# Patient Record
Sex: FEMALE | Race: OTHER | HISPANIC OR LATINO | URBAN - METROPOLITAN AREA
[De-identification: names, ages, dates, MRNs, and addresses within clinical notes are randomized per-mention and may not be internally consistent; named-entity substitution may affect disease eponyms.]

---

## 2017-08-29 ENCOUNTER — INPATIENT (INPATIENT)
Facility: HOSPITAL | Age: 82
LOS: 1 days | Discharge: HOME HEALTH | End: 2017-08-31
Attending: HOSPITALIST | Admitting: HOSPITALIST
Payer: MEDICAID

## 2017-08-29 VITALS
DIASTOLIC BLOOD PRESSURE: 92 MMHG | SYSTOLIC BLOOD PRESSURE: 162 MMHG | HEART RATE: 124 BPM | OXYGEN SATURATION: 94 % | RESPIRATION RATE: 20 BRPM | TEMPERATURE: 98 F

## 2017-08-29 DIAGNOSIS — E11.9 TYPE 2 DIABETES MELLITUS WITHOUT COMPLICATIONS: ICD-10-CM

## 2017-08-29 DIAGNOSIS — Z29.9 ENCOUNTER FOR PROPHYLACTIC MEASURES, UNSPECIFIED: ICD-10-CM

## 2017-08-29 DIAGNOSIS — E87.1 HYPO-OSMOLALITY AND HYPONATREMIA: ICD-10-CM

## 2017-08-29 DIAGNOSIS — E27.9 DISORDER OF ADRENAL GLAND, UNSPECIFIED: ICD-10-CM

## 2017-08-29 DIAGNOSIS — K25.9 GASTRIC ULCER, UNSPECIFIED AS ACUTE OR CHRONIC, WITHOUT HEMORRHAGE OR PERFORATION: ICD-10-CM

## 2017-08-29 DIAGNOSIS — D72.829 ELEVATED WHITE BLOOD CELL COUNT, UNSPECIFIED: ICD-10-CM

## 2017-08-29 DIAGNOSIS — I10 ESSENTIAL (PRIMARY) HYPERTENSION: ICD-10-CM

## 2017-08-29 DIAGNOSIS — M25.50 PAIN IN UNSPECIFIED JOINT: ICD-10-CM

## 2017-08-29 DIAGNOSIS — K86.9 DISEASE OF PANCREAS, UNSPECIFIED: ICD-10-CM

## 2017-08-29 LAB
ALBUMIN SERPL ELPH-MCNC: 3.1 G/DL — LOW (ref 3.3–5)
ALP SERPL-CCNC: 108 U/L — SIGNIFICANT CHANGE UP (ref 40–120)
ALT FLD-CCNC: 19 U/L — SIGNIFICANT CHANGE UP (ref 4–33)
AMYLASE P1 CFR SERPL: 209 U/L — HIGH (ref 25–125)
APPEARANCE UR: CLEAR — SIGNIFICANT CHANGE UP
AST SERPL-CCNC: 27 U/L — SIGNIFICANT CHANGE UP (ref 4–32)
BASE EXCESS BLDV CALC-SCNC: 3.3 MMOL/L — SIGNIFICANT CHANGE UP
BILIRUB SERPL-MCNC: 0.9 MG/DL — SIGNIFICANT CHANGE UP (ref 0.2–1.2)
BILIRUB UR-MCNC: NEGATIVE — SIGNIFICANT CHANGE UP
BLOOD GAS VENOUS - CREATININE: 0.72 MG/DL — SIGNIFICANT CHANGE UP (ref 0.5–1.3)
BLOOD UR QL VISUAL: NEGATIVE — SIGNIFICANT CHANGE UP
BUN SERPL-MCNC: 12 MG/DL — SIGNIFICANT CHANGE UP (ref 7–23)
BUN SERPL-MCNC: 16 MG/DL — SIGNIFICANT CHANGE UP (ref 7–23)
CALCIUM SERPL-MCNC: 8.3 MG/DL — LOW (ref 8.4–10.5)
CALCIUM SERPL-MCNC: 9.2 MG/DL — SIGNIFICANT CHANGE UP (ref 8.4–10.5)
CHLORIDE BLDV-SCNC: 91 MMOL/L — LOW (ref 96–108)
CHLORIDE SERPL-SCNC: 84 MMOL/L — LOW (ref 98–107)
CHLORIDE SERPL-SCNC: 89 MMOL/L — LOW (ref 98–107)
CO2 SERPL-SCNC: 24 MMOL/L — SIGNIFICANT CHANGE UP (ref 22–31)
CO2 SERPL-SCNC: 25 MMOL/L — SIGNIFICANT CHANGE UP (ref 22–31)
COLOR SPEC: SIGNIFICANT CHANGE UP
CREAT SERPL-MCNC: 0.79 MG/DL — SIGNIFICANT CHANGE UP (ref 0.5–1.3)
CREAT SERPL-MCNC: 0.89 MG/DL — SIGNIFICANT CHANGE UP (ref 0.5–1.3)
GAS PNL BLDV: 123 MMOL/L — LOW (ref 136–146)
GLUCOSE BLDV-MCNC: 216 — HIGH (ref 70–99)
GLUCOSE SERPL-MCNC: 211 MG/DL — HIGH (ref 70–99)
GLUCOSE SERPL-MCNC: 224 MG/DL — HIGH (ref 70–99)
GLUCOSE UR-MCNC: 100 — SIGNIFICANT CHANGE UP
HBA1C BLD-MCNC: 8.7 % — HIGH (ref 4–5.6)
HCO3 BLDV-SCNC: 26 MMOL/L — SIGNIFICANT CHANGE UP (ref 20–27)
HCT VFR BLD CALC: 35.4 % — SIGNIFICANT CHANGE UP (ref 34.5–45)
HCT VFR BLDV CALC: 34.9 % — SIGNIFICANT CHANGE UP (ref 34.5–45)
HGB BLD-MCNC: 11.9 G/DL — SIGNIFICANT CHANGE UP (ref 11.5–15.5)
HGB BLDV-MCNC: 11.3 G/DL — LOW (ref 11.5–15.5)
KETONES UR-MCNC: NEGATIVE — SIGNIFICANT CHANGE UP
LACTATE BLDV-MCNC: 1.3 MMOL/L — SIGNIFICANT CHANGE UP (ref 0.5–2)
LEUKOCYTE ESTERASE UR-ACNC: HIGH
LIDOCAIN IGE QN: 38.5 U/L — SIGNIFICANT CHANGE UP (ref 7–60)
MCHC RBC-ENTMCNC: 27.4 PG — SIGNIFICANT CHANGE UP (ref 27–34)
MCHC RBC-ENTMCNC: 33.6 % — SIGNIFICANT CHANGE UP (ref 32–36)
MCV RBC AUTO: 81.4 FL — SIGNIFICANT CHANGE UP (ref 80–100)
MUCOUS THREADS # UR AUTO: SIGNIFICANT CHANGE UP
NITRITE UR-MCNC: NEGATIVE — SIGNIFICANT CHANGE UP
NRBC # FLD: 0 — SIGNIFICANT CHANGE UP
PCO2 BLDV: 53 MMHG — HIGH (ref 41–51)
PH BLDV: 7.35 PH — SIGNIFICANT CHANGE UP (ref 7.32–7.43)
PH UR: 7 — SIGNIFICANT CHANGE UP (ref 4.6–8)
PLATELET # BLD AUTO: 355 K/UL — SIGNIFICANT CHANGE UP (ref 150–400)
PMV BLD: 10.1 FL — SIGNIFICANT CHANGE UP (ref 7–13)
PO2 BLDV: 27 MMHG — LOW (ref 35–40)
POTASSIUM BLDV-SCNC: 3.6 MMOL/L — SIGNIFICANT CHANGE UP (ref 3.4–4.5)
POTASSIUM SERPL-MCNC: 3.4 MMOL/L — LOW (ref 3.5–5.3)
POTASSIUM SERPL-MCNC: 3.9 MMOL/L — SIGNIFICANT CHANGE UP (ref 3.5–5.3)
POTASSIUM SERPL-SCNC: 3.4 MMOL/L — LOW (ref 3.5–5.3)
POTASSIUM SERPL-SCNC: 3.9 MMOL/L — SIGNIFICANT CHANGE UP (ref 3.5–5.3)
PROT SERPL-MCNC: 7.2 G/DL — SIGNIFICANT CHANGE UP (ref 6–8.3)
PROT UR-MCNC: 10 — SIGNIFICANT CHANGE UP
RBC # BLD: 4.35 M/UL — SIGNIFICANT CHANGE UP (ref 3.8–5.2)
RBC # FLD: 13.3 % — SIGNIFICANT CHANGE UP (ref 10.3–14.5)
RBC CASTS # UR COMP ASSIST: SIGNIFICANT CHANGE UP (ref 0–?)
SAO2 % BLDV: 43.5 % — LOW (ref 60–85)
SODIUM SERPL-SCNC: 125 MMOL/L — LOW (ref 135–145)
SODIUM SERPL-SCNC: 127 MMOL/L — LOW (ref 135–145)
SP GR SPEC: > 1.04 — HIGH (ref 1–1.03)
SQUAMOUS # UR AUTO: SIGNIFICANT CHANGE UP
UROBILINOGEN FLD QL: NORMAL E.U. — SIGNIFICANT CHANGE UP (ref 0.1–0.2)
WBC # BLD: 14.44 K/UL — HIGH (ref 3.8–10.5)
WBC # FLD AUTO: 14.44 K/UL — HIGH (ref 3.8–10.5)
WBC UR QL: HIGH (ref 0–?)

## 2017-08-29 PROCEDURE — 99223 1ST HOSP IP/OBS HIGH 75: CPT | Mod: AI

## 2017-08-29 PROCEDURE — 74177 CT ABD & PELVIS W/CONTRAST: CPT | Mod: 26

## 2017-08-29 PROCEDURE — 71275 CT ANGIOGRAPHY CHEST: CPT | Mod: 26

## 2017-08-29 RX ORDER — INSULIN LISPRO 100/ML
VIAL (ML) SUBCUTANEOUS
Qty: 0 | Refills: 0 | Status: DISCONTINUED | OUTPATIENT
Start: 2017-08-29 | End: 2017-08-31

## 2017-08-29 RX ORDER — ONDANSETRON 8 MG/1
4 TABLET, FILM COATED ORAL ONCE
Qty: 0 | Refills: 0 | Status: COMPLETED | OUTPATIENT
Start: 2017-08-29 | End: 2017-08-29

## 2017-08-29 RX ORDER — SODIUM CHLORIDE 9 MG/ML
1000 INJECTION INTRAMUSCULAR; INTRAVENOUS; SUBCUTANEOUS
Qty: 0 | Refills: 0 | Status: DISCONTINUED | OUTPATIENT
Start: 2017-08-29 | End: 2017-08-30

## 2017-08-29 RX ORDER — DEXTROSE 50 % IN WATER 50 %
1 SYRINGE (ML) INTRAVENOUS ONCE
Qty: 0 | Refills: 0 | Status: DISCONTINUED | OUTPATIENT
Start: 2017-08-29 | End: 2017-08-31

## 2017-08-29 RX ORDER — SIMVASTATIN 20 MG/1
20 TABLET, FILM COATED ORAL AT BEDTIME
Qty: 0 | Refills: 0 | Status: DISCONTINUED | OUTPATIENT
Start: 2017-08-29 | End: 2017-08-31

## 2017-08-29 RX ORDER — PANTOPRAZOLE SODIUM 20 MG/1
40 TABLET, DELAYED RELEASE ORAL ONCE
Qty: 0 | Refills: 0 | Status: COMPLETED | OUTPATIENT
Start: 2017-08-29 | End: 2017-08-29

## 2017-08-29 RX ORDER — DEXTROSE 50 % IN WATER 50 %
25 SYRINGE (ML) INTRAVENOUS ONCE
Qty: 0 | Refills: 0 | Status: DISCONTINUED | OUTPATIENT
Start: 2017-08-29 | End: 2017-08-31

## 2017-08-29 RX ORDER — ASPIRIN/CALCIUM CARB/MAGNESIUM 324 MG
81 TABLET ORAL DAILY
Qty: 0 | Refills: 0 | Status: DISCONTINUED | OUTPATIENT
Start: 2017-08-29 | End: 2017-08-31

## 2017-08-29 RX ORDER — DEXTROSE 50 % IN WATER 50 %
12.5 SYRINGE (ML) INTRAVENOUS ONCE
Qty: 0 | Refills: 0 | Status: DISCONTINUED | OUTPATIENT
Start: 2017-08-29 | End: 2017-08-31

## 2017-08-29 RX ORDER — SODIUM CHLORIDE 9 MG/ML
1000 INJECTION INTRAMUSCULAR; INTRAVENOUS; SUBCUTANEOUS ONCE
Qty: 0 | Refills: 0 | Status: COMPLETED | OUTPATIENT
Start: 2017-08-29 | End: 2017-08-29

## 2017-08-29 RX ORDER — PANTOPRAZOLE SODIUM 20 MG/1
40 TABLET, DELAYED RELEASE ORAL
Qty: 0 | Refills: 0 | Status: DISCONTINUED | OUTPATIENT
Start: 2017-08-29 | End: 2017-08-30

## 2017-08-29 RX ORDER — AMLODIPINE BESYLATE 2.5 MG/1
10 TABLET ORAL DAILY
Qty: 0 | Refills: 0 | Status: DISCONTINUED | OUTPATIENT
Start: 2017-08-29 | End: 2017-08-29

## 2017-08-29 RX ORDER — SODIUM CHLORIDE 9 MG/ML
1000 INJECTION, SOLUTION INTRAVENOUS
Qty: 0 | Refills: 0 | Status: DISCONTINUED | OUTPATIENT
Start: 2017-08-29 | End: 2017-08-31

## 2017-08-29 RX ORDER — METOPROLOL TARTRATE 50 MG
100 TABLET ORAL
Qty: 0 | Refills: 0 | Status: DISCONTINUED | OUTPATIENT
Start: 2017-08-29 | End: 2017-08-31

## 2017-08-29 RX ORDER — SODIUM CHLORIDE 9 MG/ML
500 INJECTION INTRAMUSCULAR; INTRAVENOUS; SUBCUTANEOUS ONCE
Qty: 0 | Refills: 0 | Status: COMPLETED | OUTPATIENT
Start: 2017-08-29 | End: 2017-08-29

## 2017-08-29 RX ORDER — INSULIN LISPRO 100/ML
VIAL (ML) SUBCUTANEOUS AT BEDTIME
Qty: 0 | Refills: 0 | Status: DISCONTINUED | OUTPATIENT
Start: 2017-08-29 | End: 2017-08-31

## 2017-08-29 RX ORDER — GLUCAGON INJECTION, SOLUTION 0.5 MG/.1ML
1 INJECTION, SOLUTION SUBCUTANEOUS ONCE
Qty: 0 | Refills: 0 | Status: DISCONTINUED | OUTPATIENT
Start: 2017-08-29 | End: 2017-08-31

## 2017-08-29 RX ORDER — MORPHINE SULFATE 50 MG/1
6 CAPSULE, EXTENDED RELEASE ORAL ONCE
Qty: 0 | Refills: 0 | Status: DISCONTINUED | OUTPATIENT
Start: 2017-08-29 | End: 2017-08-29

## 2017-08-29 RX ADMIN — PANTOPRAZOLE SODIUM 40 MILLIGRAM(S): 20 TABLET, DELAYED RELEASE ORAL at 08:13

## 2017-08-29 RX ADMIN — SIMVASTATIN 20 MILLIGRAM(S): 20 TABLET, FILM COATED ORAL at 21:22

## 2017-08-29 RX ADMIN — Medication 2: at 19:35

## 2017-08-29 RX ADMIN — MORPHINE SULFATE 6 MILLIGRAM(S): 50 CAPSULE, EXTENDED RELEASE ORAL at 06:06

## 2017-08-29 RX ADMIN — ONDANSETRON 4 MILLIGRAM(S): 8 TABLET, FILM COATED ORAL at 06:12

## 2017-08-29 RX ADMIN — ONDANSETRON 4 MILLIGRAM(S): 8 TABLET, FILM COATED ORAL at 05:11

## 2017-08-29 RX ADMIN — SODIUM CHLORIDE 75 MILLILITER(S): 9 INJECTION INTRAMUSCULAR; INTRAVENOUS; SUBCUTANEOUS at 14:00

## 2017-08-29 RX ADMIN — Medication 100 MILLIGRAM(S): at 19:29

## 2017-08-29 RX ADMIN — MORPHINE SULFATE 6 MILLIGRAM(S): 50 CAPSULE, EXTENDED RELEASE ORAL at 05:11

## 2017-08-29 RX ADMIN — Medication 81 MILLIGRAM(S): at 19:29

## 2017-08-29 RX ADMIN — Medication 30 MILLILITER(S): at 08:13

## 2017-08-29 RX ADMIN — Medication: at 13:32

## 2017-08-29 RX ADMIN — SODIUM CHLORIDE 250 MILLILITER(S): 9 INJECTION INTRAMUSCULAR; INTRAVENOUS; SUBCUTANEOUS at 07:25

## 2017-08-29 RX ADMIN — SODIUM CHLORIDE 1000 MILLILITER(S): 9 INJECTION INTRAMUSCULAR; INTRAVENOUS; SUBCUTANEOUS at 05:11

## 2017-08-29 NOTE — H&P ADULT - NSHPREVIEWOFSYSTEMS_GEN_ALL_CORE
Review of Systems:   CONSTITUTIONAL: No fever, weight loss, or fatigue  EYES: No eye pain, visual disturbances, or discharge  ENMT:  No difficulty hearing, tinnitus, vertigo; No sinus or throat pain  NECK: No pain or stiffness  BREASTS: No pain, masses, or nipple discharge  RESPIRATORY: No cough, wheezing, chills or hemoptysis; No shortness of breath  CARDIOVASCULAR: No chest pain, palpitations, dizziness, or leg swelling  GASTROINTESTINAL: + epigastric pain, + n/v  GENITOURINARY: No dysuria, frequency, hematuria, or incontinence  NEUROLOGICAL: No headaches, memory loss, loss of strength, numbness, or tremors  SKIN: No itching, burning, rashes, or lesions   LYMPH NODES: No enlarged glands  ENDOCRINE: No heat or cold intolerance; No hair loss  MUSCULOSKELETAL: No joint pain or swelling; No muscle, back, or extremity pain  PSYCHIATRIC: No depression, anxiety, mood swings, or difficulty sleeping  HEME/LYMPH: No easy bruising, or bleeding gums  ALLERY AND IMMUNOLOGIC: No hives or eczema

## 2017-08-29 NOTE — ED ADULT NURSE NOTE - CHIEF COMPLAINT QUOTE
Patient c/o generalized abdominal pain since yesterday, had nausea and vomiting. Tachycardiac in triage, EKG done.

## 2017-08-29 NOTE — H&P ADULT - PROBLEM SELECTOR PLAN 5
IMPROVE score 1 low risk for DVT incidental finding of adrenal nodule on CT-a/p  MRI -abd can be done as outpatient c/w home med  monitor BP

## 2017-08-29 NOTE — H&P ADULT - PROBLEM SELECTOR PLAN 2
likely due to dehydration from decreased oral intake  IVF   monitor electrolytes   check urine osmo / electrolytes no sign of active infection. Likely reactive.

## 2017-08-29 NOTE — CONSULT NOTE ADULT - ASSESSMENT
Impression:    1. Abdominal pain: With possible gastric ulcer seen on CT. DDX also includes diabetic gastroparesis given elevated A1C h/e pain is more prominent than nausea. Possible NSAID induced ulcer given hx of NSAID use. No sign of GI bleeding.    2. 7mm Pancreas hypodensity.    Recommendation:  -will consider performing EGD to confirm diagnosis of peptic ulcer disease and to r/o malignancy.  -Agree with empiric PPI and pain control  -Outpatient MRI is acceptable to evaluate Pancreas lesion. Impression:    1. Abdominal pain: With possible gastric ulcer seen on CT. DDX also includes diabetic gastroparesis given elevated A1C h/e pain is more prominent than nausea. Possible NSAID induced ulcer given hx of NSAID use. No sign of GI bleeding.    2. 7mm Pancreas hypodensity.    Recommendation:  -EGD tomorrow to confirm diagnosis of peptic ulcer disease and to r/o malignancy.  -Agree with empiric PPI and pain control  -Outpatient MRI is acceptable to evaluate Pancreas lesion.  -please obtain TTE prior to endoscopy given CTA evidence of Pulm HTN Impression:    1. Abdominal pain: With possible gastric ulcer seen on CT. DDX also includes diabetic gastroparesis given elevated A1C h/e pain is more prominent than nausea. Possible NSAID induced ulcer given hx of NSAID use. No sign of GI bleeding.    2. 7mm Pancreas hypodensity.    Recommendation:  -EGD tomorrow to confirm diagnosis of peptic ulcer disease and to r/o malignancy.  -Agree with empiric PPI and pain control  -Outpatient MRI is acceptable to evaluate Pancreas lesion.  -please obtain TTE prior to endoscopy given CTA evidence of Pulm HTN   -would hold ASA as this is for primary prevention in the setting of likely severe PUD Impression:    1. Abdominal pain: With possible gastric ulcer seen on CT. DDX also includes diabetic gastroparesis given elevated A1C h/e pain is more prominent than nausea. Possible NSAID induced ulcer given hx of NSAID use. No sign of GI bleeding.    2. 7mm Pancreas hypodensity.    Recommendation:  -EGD tomorrow to confirm diagnosis of peptic ulcer disease and to r/o malignancy.  -Agree with empiric PPI and pain control  -Outpatient MRI is acceptable to evaluate Pancreas lesion.  -please obtain TTE prior to endoscopy given CTA evidence of Pulm HTN   -Would recommend against concurrent meloxicam and ASA intake.

## 2017-08-29 NOTE — H&P ADULT - HISTORY OF PRESENT ILLNESS
84F PMH of HTN, DM, HLD p/w epigastric abd pain x 3 days. Pt reports gradually worsening intermittent epigastric pain with associated nausea, NB/NB vomiting x 2, non-radiating. No obvious relieving or exacerbation factors. Denies diarrhea, CP, SOB, dysuria, fever. Has never had this pain before. Denies hx abd surgery. decreased oral intake for the last few days.   While in ER, tachycardia but resolved. Afebrile. + leukocytosis, hyponatremia 125. Lipase / lactate WNL. CT-abd showed Nonperforated gastric antral ulcer.  Pt was given morphine in ER. No abd pain when interviewed. 84F PMH of HTN, DM, HLD p/w epigastric abd pain x 3 days. Pt reports gradually worsening intermittent epigastric pain with associated nausea, NB/NB vomiting x 2, non-radiating. No obvious relieving or exacerbation factors. Denies diarrhea, CP, SOB, dysuria, fever. Has never had this pain before. Denies hx abd surgery. decreased oral intake for the last few days.   While in ER, tachycardia but resolved. Afebrile. + leukocytosis, hyponatremia 125. Lipase / lactate WNL. CT-abd showed Nonperforated gastric antral ulcer.  Pt was given morphine in ER. No abd pain when interviewed.     Of note, she was supposed to be on omeprazole 20mg (daily) but stopped taking it on her own few weeks ago.  In relation to her other conditions, she thought that she was compliant with all her medications but when confirming with her outpatient pharmacy, apparently her precose 50mg TID ran out in June (which explains her elevated glucose levels) and she hasn’t refilled her amlodipine 10mg (daily) since April.

## 2017-08-29 NOTE — ED PROVIDER NOTE - OBJECTIVE STATEMENT
84F with HTN, DM, HLD p/w abd pain x 3 days. Reports gradually worsening constant epigastric pain with associated nausea, NB/NB vomiting. Denies CP, SOB, dysuria, fever. Has never had this pain before. Denies hx abd surgery.

## 2017-08-29 NOTE — H&P ADULT - NSHPPHYSICALEXAM_GEN_ALL_CORE
PHYSICAL EXAM:  GENERAL: NAD, well-developed  HEAD:  Atraumatic, Normocephalic  EYES: EOMI, PERRLA, conjunctiva and sclera clear  NECK: Supple, No JVD  CHEST/LUNG: Clear to auscultation bilaterally; No wheeze  HEART: Regular rate and rhythm; No murmurs, rubs, or gallops  ABDOMEN: Soft, Nontender, Nondistended; Bowel sounds present  EXTREMITIES:  2+ Peripheral Pulses, No clubbing, cyanosis, or edema  PSYCH: AAOx2 (doesn't know the day, month, year)  NEUROLOGY: non-focal  SKIN: No rashes or lesions

## 2017-08-29 NOTE — H&P ADULT - PROBLEM SELECTOR PLAN 1
acute epigastric pain, likely due to gastric ulcer. No sign of bleeding  c/w PPI, symptom control   GI consult acute epigastric pain, likely due to gastric ulcer form use of NSAIDS. No sign of bleeding  c/w PPI, symptom control. Avoid NSAIDS  GI consult

## 2017-08-29 NOTE — ED ADULT NURSE NOTE - PMH
Chronic hip pain    Chronic shoulder pain    DM (diabetes mellitus)    HTN (hypertension)    Hyperlipemia    Osteoarthritis

## 2017-08-29 NOTE — CONSULT NOTE ADULT - SUBJECTIVE AND OBJECTIVE BOX
Chief Complaint:  Patient is a 84y old  Female who presents with a chief complaint of epigastric pain x 3 days (29 Aug 2017 13:24)      HPI: 84 year old female with a history of HTN HLD DM2 presenting with 2 days of abdominal pain. The pain is epigastric and intermittent, the quality cannot be described, it occasionally radiates to the back and is associated with 2 episodes of NBNB emesis. She does take meloxicam daily but is unsure of the duration. No other NSAID use. She has never had endoscopy or colonoscopy. She has no recent weight loss or early satiety. She Does not associate the pain with eating per say although she has stopped eating since then. There is no melena or diarrhea or constipation. She denies chest pain orthopnea or dyspnea on exertion.    Allergies:  dust (Sneezing)  No Known Drug Allergies  POLLEN (Rhinorrhea; Sneezing)      Home Medications:    Hospital Medications:  aspirin enteric coated 81 milliGRAM(s) Oral daily  enalapril 20 milliGRAM(s) Oral two times a day  simvastatin 20 milliGRAM(s) Oral at bedtime  metoprolol 100 milliGRAM(s) Oral two times a day  amLODIPine   Tablet 10 milliGRAM(s) Oral daily  pantoprazole    Tablet 40 milliGRAM(s) Oral before breakfast  insulin lispro (HumaLOG) corrective regimen sliding scale   SubCutaneous three times a day before meals  insulin lispro (HumaLOG) corrective regimen sliding scale   SubCutaneous at bedtime  dextrose 5%. 1000 milliLiter(s) IV Continuous <Continuous>  dextrose Gel 1 Dose(s) Oral once PRN  dextrose 50% Injectable 12.5 Gram(s) IV Push once  dextrose 50% Injectable 25 Gram(s) IV Push once  dextrose 50% Injectable 25 Gram(s) IV Push once  glucagon  Injectable 1 milliGRAM(s) IntraMuscular once PRN  sodium chloride 0.9%. 1000 milliLiter(s) IV Continuous <Continuous>      PMHX/PSHX:  Osteoarthritis  Chronic hip pain  Chronic shoulder pain  Hyperlipemia  DM (diabetes mellitus)  HTN (hypertension)  No significant past surgical history      Family history:  No pertinent family history in first degree relatives  Family history unknown      Social History: nonsmoker nondrinker, originally from [facility].    ROS:     General:  No wt loss, fevers, chills, night sweats, fatigue,   Eyes:  Good vision, no reported pain  ENT:  No sore throat, pain, runny nose, dysphagia  CV:  No pain, palpitations, hypo/hypertension  Resp:  No dyspnea, cough, tachypnea, wheezing  GI:  See HPI  :  No pain, bleeding, incontinence, nocturia  Muscle:  No pain, weakness  Neuro:  No weakness, tingling, memory problems  Psych:  No fatigue, insomnia, mood problems, depression  Endocrine:  No polyuria, polydipsia, cold/heat intolerance  Heme:  No petechiae, ecchymosis, easy bruisability  Skin:  No rash, edema      PHYSICAL EXAM:     GENERAL:  Appears stated age, well-groomed, well-nourished, no distress  HEENT:  NC/AT,  conjunctivae clear and pink,  no JVD  CHEST:  Full & symmetric excursion, no increased effort, breath sounds clear  HEART:  Regular rhythm, S1, S2, no murmur/rub/S3/S4, no abdominal bruit, no edema  ABDOMEN:  Soft, mildly tender epigastrium, non-distended, normoactive bowel sounds,  no masses , no hepatosplenomegaly  EXTREMITIES:  no cyanosis,clubbing or edema  SKIN:  No rash/erythema/ecchymoses/petechiae/wounds/abscess/warm/dry  NEURO:  Alert, oriented    Vital Signs:  Vital Signs Last 24 Hrs  T(C): 37.3 (29 Aug 2017 15:37), Max: 37.3 (29 Aug 2017 15:37)  T(F): 99.1 (29 Aug 2017 15:37), Max: 99.1 (29 Aug 2017 15:37)  HR: 100 (29 Aug 2017 15:37) (97 - 124)  BP: 114/62 (29 Aug 2017 15:37) (114/62 - 166/87)  BP(mean): --  RR: 17 (29 Aug 2017 13:09) (16 - 20)  SpO2: 98% (29 Aug 2017 13:09) (94% - 100%)  Daily     Daily     LABS:                        11.9   14.44 )-----------( 355      ( 29 Aug 2017 04:45 )             35.4     08-29    127<L>  |  89<L>  |  12  ----------------------------<  211<H>  3.9   |  25  |  0.79    Ca    8.3<L>      29 Aug 2017 09:09    TPro  7.2  /  Alb  3.1<L>  /  TBili  0.9  /  DBili  x   /  AST  27  /  ALT  19  /  AlkPhos  108  08-29    LIVER FUNCTIONS - ( 29 Aug 2017 04:45 )  Alb: 3.1 g/dL / Pro: 7.2 g/dL / ALK PHOS: 108 u/L / ALT: 19 u/L / AST: 27 u/L / GGT: x             Urinalysis Basic - ( 29 Aug 2017 10:11 )    Color: PLYEL / Appearance: CLEAR / SG: > 1.040 / pH: 7.0  Gluc: 100 / Ketone: NEGATIVE  / Bili: NEGATIVE / Urobili: NORMAL E.U.   Blood: NEGATIVE / Protein: 10 / Nitrite: NEGATIVE   Leuk Esterase: SMALL / RBC: 0-2 / WBC 5-10   Sq Epi: OCC / Non Sq Epi: x / Bacteria: x      Amylase [id number]      Lipase serum38.5       Ammonia--      Imaging:    < from: CT Abdomen and Pelvis w/ IV Cont (08.29.17 @ 07:17) >    EXAM:  CT ABDOMEN AND PELVIS IC      EXAM:  CT ANGIO CHEST (W)AW IC        PROCEDURE DATE:  Aug 29 2017         INTERPRETATION:  CLINICAL INFORMATION: Chest pain and abdominal pain.   Concern for pulmonary embolism.    COMPARISON: None.    PROCEDURE:   CT angiogram of the chest was performed with intravenous contrast.   Subsequently, delayed CT images of the abdomen and pelvis were obtained.   Imaging was performed through the chest in the arterial phase followed by   imaging of the abdomen andpelvis in the portal venous phase.  Intravenous contrast: 90 ml Omnipaque 350. 10 ml discarded.  Oral contrast: Positive contrast was administered.  Sagittal and coronal reformats were performed. Axial MIP reformats of the   chest were also obtained.    FINDINGS:    CHEST:     LUNGS AND LARGE AIRWAYS: Patent central airways. Mild bibasilar   atelectatic changes. No focal pulmonary consolidation  PLEURA: No pleural effusion or pneumothorax.  VESSELS: Evaluation of the bilateral lower lobe subsegmental pulmonary   arteries is limited due to respiratory motion however no definite   pulmonary embolism is identified.  Dilatation of the main pulmonary   artery segment to 3.5 cm suggesting pulmonary arterial hypertension. No   evidence of aortic dissection. The ascending aorta measures 3.9 cm in   diameter at the level of the main pulmonary artery. Mild calcific   atherosclerosis.  HEART: Cardiomegaly. No pericardial effusion. Coronary artery   calcifications are present.  MEDIASTINUM AND JAY JAY: No lymphadenopathy.   LOWER NECK: Nodular thyroid gland.  SOFT TISSUES: Within normal limits.  BONES: No suspicious osseous lesions. Generalized osteopenia.   Degenerative changes of the lower thoracic spine.    ABDOMEN AND PELVIS:    LIVER: Subcentimeter hypodense lesion at the dome too small characterize.  BILE DUCTS: Normal caliber.  GALLBLADDER: Cholelithiasis without evidence of acute cholecystitis.  SPLEEN: Within normal limits.  PANCREAS: 7 mm hypodense lesion in the pancreatic body. No main   pancreatic ductal dilatation.  ADRENALS: 9 mm indeterminate hypodense right adrenal nodule (83 HU).   Normal left adrenal gland.  KIDNEYS/URETERS: Subcentimeter bilateral renal hypodensities too small to   characterize. 2.8 x 2 cm septated left interpolar cyst. No   hydroureteronephrosis or radiopaque stones.    BLADDER: Within normal limits.  REPRODUCTIVE ORGANS: The uterus and adnexa are within normal limits.    BOWEL/MESENTERY: Diffuse fatty stranding in the region of the distal   gastric body and antrum. Mural thickening of the gastric antrum with or   area of mucosal irregularity consistent with an ulcer (image 35, series   606P and image 40-41, series 7). The ulcer measures approximately 2.2 x 2   x 1.8 cm. Sigmoid diverticulosis without acute diverticulitis.   Nonvisualization of the appendix.  No bowel obstruction.  PERITONEUM/RETROPERITONEUM: No free air, free fluid or fluid collection.  VESSELS:  Calcific atherosclerosis.  LYMPH NODES: Multiple nonenlarged subcentimeter retroperitoneal lymph   nodes, nonspecific. 1.1 cm short axis periportal lymph node, nonspecific   (image 33, series 4). No pelvic lymphadenopathy.  SOFT TISSUES: Small fat-containing umbilical hernia.  BONES: Generalized osteopenia. Multilevel degenerative changes of   visualized spine. Mild loss of height of the L1-L2 vertebral bodies. T9   intraosseous hemangioma.    IMPRESSION:     No definite pulmonary embolism.   Nonperforated gastric antral ulcer.  Indeterminate 1.0 cm right adrenal nodule. 7 mmcystic pancreatic lesion   which may represent a pseudocyst or sidebranch IPMN. Abdominal MRI would   be helpful for further evaluation.    Dr. Morley discussed the above findings with Dr Jamil at 755a on 8/29/17   with read back.                    GARETT MENEZES M.D., RADIOLOGY RESIDENT  This document has been electronically signed.  FRANK MORLEY M.D., ATTENDING RADIOLOGIST  This document has been electronically signed. Aug 29 2017  8:02AM                  < end of copied text >

## 2017-08-29 NOTE — ED PROVIDER NOTE - ATTENDING CONTRIBUTION TO CARE
84 yr old female with hx of HTN, HLD ,DM presents to ed c/o epigastric pain with nausea and vomiting on and off since yesterday am.  no fever, + abd cramping, no sob, no palpitations, no rashes, no dysuria.  unable to tolerate po.  exam elderly female in NAD, sitting upright  lungs cta  s1s2  abd ttp at epigastric, neg torres  aox4  concern for appy vs gastritis vs PUD vs gastroenteritis less likely ACS vs PE will obtain Ct PE with ABD/Pelvis run off, labs, ekg, fluids and likely admit for abd pain.  @8a- ct shows PUD will admit to medicine floor for GI eval and PUD treatment.  start on PPI.

## 2017-08-29 NOTE — ED PROVIDER NOTE - PROGRESS NOTE DETAILS
BRODY Gracia: Pt signed out to me by Dr. Maya. Pt NAD, VS improved, pt receiving 500cc bolus, no complaints of pain at this time. PE: abdomen soft, nontender, nondistended. Pending cta chest, ct abd/pelvis, repeat BMP, blood gas. BRODY Gracia: Pt signed out to me by Dr. Maya. Pt NAD, VS improved, pt receiving 500cc bolus, no complaints of pain at this time. PE: abdomen soft, nontender, nondistended. Pending cta chest, ct abd/pelvis, repeat BMP, blood gas. Pts son in law Jason Simms [phone] BRODY Gracia: Pt signed out to me by Dr. Maya. Pt NAD, VS improved, pt receiving 500cc bolus, no complaints of pain at this time, pt vomited 1 hour ago. PE: abdomen soft, nontender, nondistended. Pending cta chest, ct abd/pelvis, repeat BMP, blood gas. Pts son in law Jason Simms [phone]

## 2017-08-29 NOTE — ED ADULT NURSE NOTE - OBJECTIVE STATEMENT
alert c/o diffuse abd pain and vomiting  abd soft not tender to touch   with positive BS  seen by Dr Hernandez

## 2017-08-29 NOTE — H&P ADULT - PROBLEM SELECTOR PLAN 3
A1C added to today's lab   monitor FS A1C added to today's lab   monitor FS  Hold oral hypoglycemics likely due to dehydration from decreased oral intake  IVF   monitor electrolytes   check urine osmo / electrolytes

## 2017-08-29 NOTE — H&P ADULT - PROBLEM SELECTOR PLAN 6
incidental finding of pancreatic lesion   follow up GI rec incidental finding of adrenal nodule on CT-a/p  MRI -abd can be done as outpatient

## 2017-08-29 NOTE — H&P ADULT - NSHPLABSRESULTS_GEN_ALL_CORE
11.9   14.44 )-----------( 355      ( 29 Aug 2017 04:45 )             35.4     08-29    127<L>  |  89<L>  |  12  ----------------------------<  211<H>  3.9   |  25  |  0.79    Ca    8.3<L>      29 Aug 2017 09:09    TPro  7.2  /  Alb  3.1<L>  /  TBili  0.9  /  DBili  x   /  AST  27  /  ALT  19  /  AlkPhos  108  08-29      Urinalysis Basic - ( 29 Aug 2017 10:11 )    Color: PLYEL / Appearance: CLEAR / SG: > 1.040 / pH: 7.0  Gluc: 100 / Ketone: NEGATIVE  / Bili: NEGATIVE / Urobili: NORMAL E.U.   Blood: NEGATIVE / Protein: 10 / Nitrite: NEGATIVE   Leuk Esterase: SMALL / RBC: 0-2 / WBC 5-10   Sq Epi: OCC / Non Sq Epi: x / Bacteria: x    CT-c/a/p: no PE. + gastric ulcer

## 2017-08-29 NOTE — ED PROVIDER NOTE - MEDICAL DECISION MAKING DETAILS
84 yr old female with hx of HTN, HLD ,DM presents to ed c/o epigastric pain with nausea and vomiting on and off since yesterday am.  concern for appy vs gastritis vs PUD vs gastroenteritis less likely ACS vs PE will obtain Ct PE with ABD/Pelvis run off, labs, ekg, fluids and likely admit for abd pain.

## 2017-08-29 NOTE — H&P ADULT - PROBLEM SELECTOR PLAN 7
IMPROVE score 1 low risk for DVT avoid NSAIDS incidental finding of pancreatic lesion   follow up GI rec

## 2017-08-30 DIAGNOSIS — Z29.9 ENCOUNTER FOR PROPHYLACTIC MEASURES, UNSPECIFIED: ICD-10-CM

## 2017-08-30 DIAGNOSIS — E78.5 HYPERLIPIDEMIA, UNSPECIFIED: ICD-10-CM

## 2017-08-30 DIAGNOSIS — I50.32 CHRONIC DIASTOLIC (CONGESTIVE) HEART FAILURE: ICD-10-CM

## 2017-08-30 LAB
BUN SERPL-MCNC: 12 MG/DL — SIGNIFICANT CHANGE UP (ref 7–23)
CALCIUM SERPL-MCNC: 8.6 MG/DL — SIGNIFICANT CHANGE UP (ref 8.4–10.5)
CHLORIDE SERPL-SCNC: 94 MMOL/L — LOW (ref 98–107)
CO2 SERPL-SCNC: 24 MMOL/L — SIGNIFICANT CHANGE UP (ref 22–31)
CREAT SERPL-MCNC: 0.94 MG/DL — SIGNIFICANT CHANGE UP (ref 0.5–1.3)
GLUCOSE SERPL-MCNC: 124 MG/DL — HIGH (ref 70–99)
HCT VFR BLD CALC: 35.7 % — SIGNIFICANT CHANGE UP (ref 34.5–45)
HGB BLD-MCNC: 11.6 G/DL — SIGNIFICANT CHANGE UP (ref 11.5–15.5)
MCHC RBC-ENTMCNC: 27.5 PG — SIGNIFICANT CHANGE UP (ref 27–34)
MCHC RBC-ENTMCNC: 32.5 % — SIGNIFICANT CHANGE UP (ref 32–36)
MCV RBC AUTO: 84.6 FL — SIGNIFICANT CHANGE UP (ref 80–100)
NRBC # FLD: 0 — SIGNIFICANT CHANGE UP
PLATELET # BLD AUTO: 327 K/UL — SIGNIFICANT CHANGE UP (ref 150–400)
PMV BLD: 9.3 FL — SIGNIFICANT CHANGE UP (ref 7–13)
POTASSIUM SERPL-MCNC: 3.5 MMOL/L — SIGNIFICANT CHANGE UP (ref 3.5–5.3)
POTASSIUM SERPL-SCNC: 3.5 MMOL/L — SIGNIFICANT CHANGE UP (ref 3.5–5.3)
RBC # BLD: 4.22 M/UL — SIGNIFICANT CHANGE UP (ref 3.8–5.2)
RBC # FLD: 13.7 % — SIGNIFICANT CHANGE UP (ref 10.3–14.5)
SODIUM SERPL-SCNC: 131 MMOL/L — LOW (ref 135–145)
SPECIMEN SOURCE: SIGNIFICANT CHANGE UP
WBC # BLD: 9.56 K/UL — SIGNIFICANT CHANGE UP (ref 3.8–10.5)
WBC # FLD AUTO: 9.56 K/UL — SIGNIFICANT CHANGE UP (ref 3.8–10.5)

## 2017-08-30 PROCEDURE — 99222 1ST HOSP IP/OBS MODERATE 55: CPT | Mod: GC

## 2017-08-30 PROCEDURE — 99233 SBSQ HOSP IP/OBS HIGH 50: CPT

## 2017-08-30 PROCEDURE — 93306 TTE W/DOPPLER COMPLETE: CPT | Mod: 26

## 2017-08-30 RX ORDER — PANTOPRAZOLE SODIUM 20 MG/1
40 TABLET, DELAYED RELEASE ORAL
Qty: 0 | Refills: 0 | Status: DISCONTINUED | OUTPATIENT
Start: 2017-08-30 | End: 2017-08-31

## 2017-08-30 RX ADMIN — Medication 81 MILLIGRAM(S): at 12:09

## 2017-08-30 RX ADMIN — SIMVASTATIN 20 MILLIGRAM(S): 20 TABLET, FILM COATED ORAL at 22:02

## 2017-08-30 RX ADMIN — Medication 100 MILLIGRAM(S): at 17:37

## 2017-08-30 RX ADMIN — PANTOPRAZOLE SODIUM 40 MILLIGRAM(S): 20 TABLET, DELAYED RELEASE ORAL at 06:04

## 2017-08-30 RX ADMIN — Medication 20 MILLIGRAM(S): at 06:04

## 2017-08-30 RX ADMIN — PANTOPRAZOLE SODIUM 40 MILLIGRAM(S): 20 TABLET, DELAYED RELEASE ORAL at 17:37

## 2017-08-30 RX ADMIN — Medication 20 MILLIGRAM(S): at 17:37

## 2017-08-30 RX ADMIN — Medication 100 MILLIGRAM(S): at 06:04

## 2017-08-30 NOTE — CONSULT NOTE ADULT - ASSESSMENT
84 year old woman with epigastric tenderness and an ulcer seen in the antrum on CT, ulcer deemed too deep and at risk of perforation by gastroenterology.     Plan:    - continue to follow GI recommendations regarding H. Pylori testing and bid Protonix  - if medical management fails, or patient develops a perforation, she will likely need emergent operation  - patient and family at bedside were counseled on the possibility of surgery and the family reaffirmed that they would like everything done for the patient  - please have the patient have an non-emergent MRI to evaluate cystic pancreatic lesion    -Mae FRANCO, PGY-2

## 2017-08-30 NOTE — CONSULT NOTE ADULT - ATTENDING COMMENTS
2017  4:30 AM    Hospital Day #2    Initial General Surgical Consultation.    I was asked to evaluate this patient and provide a General Surgical Consultation. Care was discussed with the surgical resident consultant and the adjacent resident note was reviewed. The patient’s chart is reviewed and she is examined.     This patient is an 84-year-old hypertensive female who presented to the ED at [facility] at approximately 3:40 AM on 17 with complaints of having abdominal pain. The pain, that began a few days prior to the day of hospitalization, was located diffusely in her abdomen but mostly in the epigastrium and was accompanied with anorexia, nausea, and emesis. She denied having had fever or chills and did not have diarrhea or constipation. She had not had similar pain previously and denied having been jaundiced or having dark urine. She had not had sick contacts or recent foreign travel. She denied having had chest pain, dyspnea, or dysuria. A few weeks prior to the current hospitalization the patient self-discontinued her omeprazole. She had not refilled a prescription for amlodipine for a few months and the prescription for the precise had  two months prior to the current hospitalization.    She has a medical history significant for having hypertension, osteoarthritis with chronic shoulder and hip pain, hyperlipidemia, and non-insulin requiring type 2 diabetes mellitus. She was evaluated in the ED at [facility] on 3/3/08 for evaluation and treatment of shoulder pain for which she had been getting injections.  She was hospitalized on the Medical Service at [facility], [place] from 14 – 14 for evaluation and treatment of hypoglycemia. 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 Prior to the current hospitalization she took:    1)	Norvasc	2)	Lopressor	3)	Vasotec	4)	Precose  5)	Zocor		6)	Prilosec		7)	Dronabinol	8)	Tylenol  9)	MVI    She has no known medication allergies and she is allergic to pollens / dust. She does not abuse either ethanol or tobacco. She is a  and resides with her children in a residence in . The patient has previously designated Mick Chin to be her health care agent / proxy. She is from [facility] and has been in the [facility] for approximately 20 years. She commonly ambulates with the assistance of a cane. The patient has a home health aide (reportedly her daughter-in-law) for 20 hours per week.    On presentation to the ED she had a:    BP	=	162/92  P	=	124  R	=	20  Temp	=	36.8  O2 Sat	=	94%  VAS	=	10/10    She was awake, alert and oriented. She was in mild distress due to pain.  She had tachycardia without a murmur.  She had diffuse abdominal pain with a soft but tender abdomen. There was no guarding or rebound.     She was found on a CT scan to have a gastric ulcer prompting her physicians to obtain a gastroenterology consultation. Apparently the initial plan was for the patient to undergo upper endoscopy to assist in elucidating the etiology of the antral ulcer (peptic vs neoplastic). Subsequently, apparently because the ulcer was noted to be relatively deep, a decision was made to postpone / defer the upper endoscopy, presumably until there was some healing of the ulcer.  It appears that the gastroenterologist suggested that if the patient developed peritonitis, a surgical consultation should be obtained. Subsequently, for unclear reason, the gastroenterologist recommended that this surgical consultation be obtained. The patient remains hospitalized and is examined now.    BP		=	116 – 157/66 - 94  P		=	65 – 91		O2 Sat	=	95% - 97%  R		=	17 – 18		I/O	=	- in/ - out  Temp		=	36.6 – 37.2    Glucose	=	108 – 180    Weight		=	63.9 Kg  BMI		=	25.8    Awake, alert, and not fully oriented. In no distress and does not appear toxic.  Anicteric. Pupils reactive. Extra-occular movements preserved.  No thrush. Normal oropharyngeal mucosa.  No JVD. No abnormal cervical adenopathy  Lungs clear with non-labored respirations. Symmetrical chest wall movements.  Heart tones regular without a murmur.  Abdomen softly distended with mild epigastric tenderness. No guarding or rebound. No palpable masses or abdominal wall hernias. No CVA tenderness. Active bowel sounds.  Extremities well perfused. No rash.    WBC	=	10	Na		=	131	Bilirubin		=	0.9  Neutro	=	-	K		=	3.5	Alk Phos	=	108  Hgb	=	12	Cl		=	94	SGOT		=	27  Hct	=	36%	HCO3		=	24	SGPT		=	19  Plts	=	327	Glucose	=	124  			BUN		=	12	Amylase	=	209  PT	=	-	Creat		=	0.9	Lipase		=	39  PTT	=	-  INR	=	-	Albumin	=	3.1	Hemoglobin A1-C	=	8.7    EKG – sinus tachycardia with a right bundle branch block (seen on prior EKG’s).    CT angiogram of the chest and CT scan of the abdomen, and pelvis with oral contrast  – No pulmonary embolism or dissection. Dilated main pulmonary artery. Antral ulcer with josefa-gastric stranding, cholelithiasis without evidence of acute cholecystitis, and diverticulosis without evidence of diverticulitis. Multiple enlarged retroperitoneal lymph nodes.    TTE  – global left ventricular ejection fraction of 70% with minimal MR. No wall motion abnormalities and stage I diastolic dysfunction. Estimated right ventricular systolic pressure of 38 mm Hg consistent with borderline pulmonary hypertension.    Remains on:    1)	Low carbohydrate DASH diet.  2)	Protonix 40 mg po bid  3)	Aspirin 81 mg po q24 hours  4)	Lopressor 100 mg po bid  5)	Vasotec 20 mg po bid  6)	Zocor 20 mg po qhs  7)	Insulin - Sliding scale    Assessment:	This patient is assessed as being a hypertensive, non-insulin requiring type 2 diabetic female who has osteoarthritis and hyperlipidemia and diastolic dysfunction who presented to the ED at [facility] at approximately 3:40 AM on 17 with complaints of having abdominal pain. She had tachycardia and was found to have a deep antral ulcer that is most likely a peptic ulcer but could be a neoplasm. She also has IPMN’s of her pancreas. She was to undergo upper endoscopy but this has been deferred for a few weeks to allow for some healing of the ulcer. There is no indication that if the ulcer is in fact malignant, a few weeks delay will be of any significance.    It is not clear as to what the question is for the surgical team. I would be reluctant to perform a (partial) gastrectomy for a benign ulcer that has not been treated (no recent proton pump or H2 blocker administration nor treatment for Helicobacter). Likewise, performing a gastrectomy for a malignancy, as a diagnostic test, is not warranted unless there is some complication (bleeding, perforation, obstruction, intractability). I agree that a surgical consultation should be sought if the patient has signs of peritonitis which she does not now have. It is possible that the consultation was prompted to have a surgeon available and knowledgeable about this patient should she subsequently develop peritonitis (perforate the ulcer) but this is not clear. Currently there is no role for surgical intervention nor do I think she needs any evaluation of the possible pancreatic IPMN’s. Will be available to re-consult if desired.    Sree Vivar  1 Hour exclusive of procedures

## 2017-08-30 NOTE — CHART NOTE - NSCHARTNOTEFT_GEN_A_CORE
Spoke to Dr. Mohamud of gastroenterology. Upon further review of CT A/P imaging between GI attg (Dr. Arroyo) and radiologist (Dr. Ibarra), there is concern that there is significant errosion to the gastric mucosa and there is concern that ulceration is on the verge of perforation. Surgical evaluation called, endoscopy canceled.

## 2017-08-30 NOTE — PROGRESS NOTE ADULT - SUBJECTIVE AND OBJECTIVE BOX
Patient is a 84y old  Female who presents with a chief complaint of epigastric pain x 3 days (29 Aug 2017 13:24)      SUBJECTIVE / OVERNIGHT EVENTS:    MEDICATIONS  (STANDING):  aspirin enteric coated 81 milliGRAM(s) Oral daily  enalapril 20 milliGRAM(s) Oral two times a day  simvastatin 20 milliGRAM(s) Oral at bedtime  metoprolol 100 milliGRAM(s) Oral two times a day  pantoprazole    Tablet 40 milliGRAM(s) Oral before breakfast  insulin lispro (HumaLOG) corrective regimen sliding scale   SubCutaneous three times a day before meals  insulin lispro (HumaLOG) corrective regimen sliding scale   SubCutaneous at bedtime  dextrose 5%. 1000 milliLiter(s) (50 mL/Hr) IV Continuous <Continuous>  dextrose 50% Injectable 12.5 Gram(s) IV Push once  dextrose 50% Injectable 25 Gram(s) IV Push once  dextrose 50% Injectable 25 Gram(s) IV Push once    MEDICATIONS  (PRN):  dextrose Gel 1 Dose(s) Oral once PRN Blood Glucose LESS THAN 70 milliGRAM(s)/deciliter  glucagon  Injectable 1 milliGRAM(s) IntraMuscular once PRN Glucose LESS THAN 70 milligrams/deciliter      Vital Signs Last 24 Hrs  T(C): 37.2 (08-30-17 @ 10:54)  T(F): 98.9 (08-30-17 @ 10:54), Max: 99.4 (08-29-17 @ 18:25)  HR: 73 (08-30-17 @ 10:54) (73 - 108)  BP: 123/66 (08-30-17 @ 10:54)  BP(mean): --  RR: 17 (08-30-17 @ 10:54) (17 - 18)  SpO2: 98% (08-30-17 @ 10:54) (95% - 98%)  Wt(kg): --    CAPILLARY BLOOD GLUCOSE  128 (30 Aug 2017 11:39)  127 (30 Aug 2017 08:29)  189 (29 Aug 2017 21:23)  159 (29 Aug 2017 19:28)        I&O's Summary      PHYSICAL EXAM:  GENERAL: NAD, well-developed  HEAD:  Atraumatic, Normocephalic  EYES: EOMI, PERRLA, conjunctiva and sclera clear  NECK: Supple, No JVD  CHEST/LUNG: Clear to auscultation bilaterally; No wheeze  HEART: Regular rate and rhythm; No murmurs, rubs, or gallops  ABDOMEN: Soft, Nontender, Nondistended; Bowel sounds present  EXTREMITIES:  2+ Peripheral Pulses, No clubbing, cyanosis, or edema  PSYCH: AAOx3  NEUROLOGY: non-focal  SKIN: No rashes or lesions    LABS:                        11.6   9.56  )-----------( 327      ( 30 Aug 2017 05:24 )             35.7     08-30    131<L>  |  94<L>  |  12  ----------------------------<  124<H>  3.5   |  24  |  0.94    Ca    8.6      30 Aug 2017 05:24    TPro  7.2  /  Alb  3.1<L>  /  TBili  0.9  /  DBili  x   /  AST  27  /  ALT  19  /  AlkPhos  108  08-29          Urinalysis Basic - ( 29 Aug 2017 10:11 )    Color: PLYEL / Appearance: CLEAR / SG: > 1.040 / pH: 7.0  Gluc: 100 / Ketone: NEGATIVE  / Bili: NEGATIVE / Urobili: NORMAL E.U.   Blood: NEGATIVE / Protein: 10 / Nitrite: NEGATIVE   Leuk Esterase: SMALL / RBC: 0-2 / WBC 5-10   Sq Epi: OCC / Non Sq Epi: x / Bacteria: x        RADIOLOGY & ADDITIONAL TESTS:    Imaging Personally Reviewed: CT A/P, TTE  < from: CT Abdomen and Pelvis w/ IV Cont (08.29.17 @ 07:17) >  IMPRESSION:     No definite pulmonary embolism.   Nonperforated gastric antral ulcer.  Indeterminate 1.0 cm right adrenal nodule. 7 mmcystic pancreatic lesion   which may represent a pseudocyst or sidebranch IPMN. Abdominal MRI would   be helpful for further evaluation.    < from: Transthoracic Echocardiogram (08.30.17 @ 09:30) >  CONCLUSIONS:  1. Normal left ventricular systolic function. No segmental  wall motion abnormalities.  2. Mild diastolic dysfunction (Stage I).  3. Normal right ventricular size and function.  4. No significant valvular disease.      Consultant(s) Notes Reviewed:  GI    Care Discussed with Consultants/Other Providers: Gastroenterology (Dr. Mohamud): EGD this afternoon    Assessment and Plan: Patient is a 84y old  Female who presents with a chief complaint of epigastric pain x 3 days (29 Aug 2017 13:24)      SUBJECTIVE / OVERNIGHT EVENTS: Patient continues to complain of abdominal pain in the epigastrium. No nausea, vomiting, diarrhea, melena, BRBPR.    MEDICATIONS  (STANDING):  aspirin enteric coated 81 milliGRAM(s) Oral daily  enalapril 20 milliGRAM(s) Oral two times a day  simvastatin 20 milliGRAM(s) Oral at bedtime  metoprolol 100 milliGRAM(s) Oral two times a day  pantoprazole    Tablet 40 milliGRAM(s) Oral before breakfast  insulin lispro (HumaLOG) corrective regimen sliding scale   SubCutaneous three times a day before meals  insulin lispro (HumaLOG) corrective regimen sliding scale   SubCutaneous at bedtime  dextrose 5%. 1000 milliLiter(s) (50 mL/Hr) IV Continuous <Continuous>  dextrose 50% Injectable 12.5 Gram(s) IV Push once  dextrose 50% Injectable 25 Gram(s) IV Push once  dextrose 50% Injectable 25 Gram(s) IV Push once    MEDICATIONS  (PRN):  dextrose Gel 1 Dose(s) Oral once PRN Blood Glucose LESS THAN 70 milliGRAM(s)/deciliter  glucagon  Injectable 1 milliGRAM(s) IntraMuscular once PRN Glucose LESS THAN 70 milligrams/deciliter      Vital Signs Last 24 Hrs  T(C): 37.2 (08-30-17 @ 10:54)  T(F): 98.9 (08-30-17 @ 10:54), Max: 99.4 (08-29-17 @ 18:25)  HR: 73 (08-30-17 @ 10:54) (73 - 108)  BP: 123/66 (08-30-17 @ 10:54)  BP(mean): --  RR: 17 (08-30-17 @ 10:54) (17 - 18)  SpO2: 98% (08-30-17 @ 10:54) (95% - 98%)  Wt(kg): --    CAPILLARY BLOOD GLUCOSE  128 (30 Aug 2017 11:39)  127 (30 Aug 2017 08:29)  189 (29 Aug 2017 21:23)  159 (29 Aug 2017 19:28)        I&O's Summary      PHYSICAL EXAM:  GENERAL: NAD, well-developed  HEAD:  Atraumatic, Normocephalic  EYES: EOMI, PERRLA, conjunctiva and sclera clear  NECK: Supple, No JVD  CHEST/LUNG: Clear to auscultation bilaterally; No wheeze  HEART: Regular rate and rhythm; No murmurs, rubs, or gallops  ABDOMEN: Soft, Nontender, Nondistended; Bowel sounds present  EXTREMITIES:  2+ Peripheral Pulses, No clubbing, cyanosis, or edema  PSYCH: AAOx3  NEUROLOGY: non-focal  SKIN: No rashes or lesions    LABS:                        11.6   9.56  )-----------( 327      ( 30 Aug 2017 05:24 )             35.7     08-30    131<L>  |  94<L>  |  12  ----------------------------<  124<H>  3.5   |  24  |  0.94    Ca    8.6      30 Aug 2017 05:24    TPro  7.2  /  Alb  3.1<L>  /  TBili  0.9  /  DBili  x   /  AST  27  /  ALT  19  /  AlkPhos  108  08-29          Urinalysis Basic - ( 29 Aug 2017 10:11 )    Color: PLYEL / Appearance: CLEAR / SG: > 1.040 / pH: 7.0  Gluc: 100 / Ketone: NEGATIVE  / Bili: NEGATIVE / Urobili: NORMAL E.U.   Blood: NEGATIVE / Protein: 10 / Nitrite: NEGATIVE   Leuk Esterase: SMALL / RBC: 0-2 / WBC 5-10   Sq Epi: OCC / Non Sq Epi: x / Bacteria: x        RADIOLOGY & ADDITIONAL TESTS:    Imaging Personally Reviewed: CT A/P, TTE  < from: CT Abdomen and Pelvis w/ IV Cont (08.29.17 @ 07:17) >  IMPRESSION:     No definite pulmonary embolism.   Nonperforated gastric antral ulcer.  Indeterminate 1.0 cm right adrenal nodule. 7 mmcystic pancreatic lesion   which may represent a pseudocyst or sidebranch IPMN. Abdominal MRI would   be helpful for further evaluation.    < from: Transthoracic Echocardiogram (08.30.17 @ 09:30) >  CONCLUSIONS:  1. Normal left ventricular systolic function. No segmental  wall motion abnormalities.  2. Mild diastolic dysfunction (Stage I).  3. Normal right ventricular size and function.  4. No significant valvular disease.      Consultant(s) Notes Reviewed:  GI    Care Discussed with Consultants/Other Providers: Gastroenterology (Dr. Mohamud): EGD this afternoon    Assessment and Plan:

## 2017-08-30 NOTE — CONSULT NOTE ADULT - SUBJECTIVE AND OBJECTIVE BOX
84 year old woman presented to the ED yesterday with complaints of chest and epigastric pain for three days with nausea and two episodes of vomiting. The last vomit was yellow bilious. She has not had this pain before. The family endorse a history of weight gain and weight loss over the last few months with the most recent weight change being a gain from 135 to 140 lbs when she went to [facility]. A CT scan was performed in the ED which showed a non-perforated ulcer of the antrum of the stomach. Gastroenterology was consulted and an endoscopy was originally planned. The CT scan was further reviewed and the ulcer was considered to be deep and on the verge of perforation. The planned endoscopy was deferred. Patient was not on a PPI at home. She is currently hungry.  CC: Patient is a 84y old  Female who presents with a chief complaint of epigastric pain x 3 days (29 Aug 2017 13:24)    84F PMH of HTN, DM, HLD p/w epigastric abd pain x 3 days. Pt reports gradually worsening intermittent epigastric pain with associated nausea, NB/NB vomiting x 2, non-radiating. No obvious relieving or exacerbation factors. Denies diarrhea, CP, SOB, dysuria, fever. Has never had this pain before. Denies hx abd surgery. decreased oral intake for the last few days.   While in ER, tachycardia but resolved. Afebrile. + leukocytosis, hyponatremia 125. Lipase / lactate WNL. CT-abd showed Nonperforated gastric antral ulcer.  Pt was given morphine in ER. No abd pain when interviewed.     Of note, she was supposed to be on omeprazole 20mg (daily) but stopped taking it on her own few weeks ago.  In relation to her other conditions, she thought that she was compliant with all her medications but when confirming with her outpatient pharmacy, apparently her precose 50mg TID ran out in June (which explains her elevated glucose levels) and she hasn’t refilled her amlodipine 10mg (daily) since April. (29 Aug 2017 13:24)      PMH  Osteoarthritis  Chronic hip pain  Chronic shoulder pain  Hyperlipemia  DM (diabetes mellitus)  HTN (hypertension)    PSH  No significant past surgical history    MEDS  MEDICATIONS  (STANDING):  aspirin enteric coated 81 milliGRAM(s) Oral daily  enalapril 20 milliGRAM(s) Oral two times a day  simvastatin 20 milliGRAM(s) Oral at bedtime  metoprolol 100 milliGRAM(s) Oral two times a day  insulin lispro (HumaLOG) corrective regimen sliding scale   SubCutaneous three times a day before meals  insulin lispro (HumaLOG) corrective regimen sliding scale   SubCutaneous at bedtime  dextrose 5%. 1000 milliLiter(s) (50 mL/Hr) IV Continuous <Continuous>  dextrose 50% Injectable 12.5 Gram(s) IV Push once  dextrose 50% Injectable 25 Gram(s) IV Push once  dextrose 50% Injectable 25 Gram(s) IV Push once  pantoprazole    Tablet 40 milliGRAM(s) Oral two times a day before meals    MEDICATIONS  (PRN):  dextrose Gel 1 Dose(s) Oral once PRN Blood Glucose LESS THAN 70 milliGRAM(s)/deciliter  glucagon  Injectable 1 milliGRAM(s) IntraMuscular once PRN Glucose LESS THAN 70 milligrams/deciliter      Allergies    dust (Sneezing)  No Known Drug Allergies  POLLEN (Rhinorrhea; Sneezing)    Intolerances        Social Patient is a never smoker, does not drink alcohol. She ambulates with a cane at home but she does not do her own shopping. She has an aide to help her when she leaves the house. Parts of her history are provided by family members at the bedside.       Physical Exam  T(C): 37.2 (08-30-17 @ 17:00), Max: 37.3 (08-29-17 @ 20:46)  HR: 88 (08-30-17 @ 17:00) (73 - 91)  BP: 157/94 (08-30-17 @ 17:00) (104/43 - 157/94)  RR: 18 (08-30-17 @ 17:00) (17 - 18)  SpO2: 96% (08-30-17 @ 17:00) (96% - 98%)  Wt(kg): --  Tmax: T(C): , Max: 37.3 (08-29-17 @ 20:46)  Wt(kg): --    Gen: NAD, A & O x2, not oriented to time/date  HEENT: normocephalic, atraumatic, no scleral icterus  CV: S1, S2, RRR  Pulm: CTA B/L  Abd: Soft, epigastric tenderness, distended, no rebound, no guarding, no palpable organomegaly/masses    Labs:                        11.6   9.56  )-----------( 327      ( 30 Aug 2017 05:24 )             35.7     08-30    131<L>  |  94<L>  |  12  ----------------------------<  124<H>  3.5   |  24  |  0.94    Ca    8.6      30 Aug 2017 05:24    TPro  7.2  /  Alb  3.1<L>  /  TBili  0.9  /  DBili  x   /  AST  27  /  ALT  19  /  AlkPhos  108  08-29      Urinalysis Basic - ( 29 Aug 2017 10:11 )    Color: PLYEL / Appearance: CLEAR / SG: > 1.040 / pH: 7.0  Gluc: 100 / Ketone: NEGATIVE  / Bili: NEGATIVE / Urobili: NORMAL E.U.   Blood: NEGATIVE / Protein: 10 / Nitrite: NEGATIVE   Leuk Esterase: SMALL / RBC: 0-2 / WBC 5-10   Sq Epi: OCC / Non Sq Epi: x / Bacteria: x            Imaging    CT Chest/Abd/Pelvis:  No definite pulmonary embolism.  Nonperforated gastric antral ulcer.  Indeterminate 1.0 cm right adrenal nodule. 7 mm cystic pancreatic lesion  which may represent a pseudocyst or sidebranch IPMN. Abdominal MRI would be  helpful for further evaluation.

## 2017-08-30 NOTE — CHART NOTE - NSCHARTNOTEFT_GEN_A_CORE
GI fellow chart update.  CT reviewed w radiology, concern for very deep ulcer in stomach at high risk for perforation. Will defer EGD. Check stool and serum h.pylori, change PPI to BID. Patient and her family were updated. Recommend surgical evaluation.

## 2017-08-31 ENCOUNTER — TRANSCRIPTION ENCOUNTER (OUTPATIENT)
Age: 82
End: 2017-08-31

## 2017-08-31 VITALS — WEIGHT: 140.88 LBS

## 2017-08-31 PROBLEM — Z00.00 ENCOUNTER FOR PREVENTIVE HEALTH EXAMINATION: Status: ACTIVE | Noted: 2017-08-31

## 2017-08-31 LAB
BACTERIA UR CULT: SIGNIFICANT CHANGE UP
BUN SERPL-MCNC: 7 MG/DL — SIGNIFICANT CHANGE UP (ref 7–23)
CALCIUM SERPL-MCNC: 8.8 MG/DL — SIGNIFICANT CHANGE UP (ref 8.4–10.5)
CHLORIDE SERPL-SCNC: 99 MMOL/L — SIGNIFICANT CHANGE UP (ref 98–107)
CO2 SERPL-SCNC: 25 MMOL/L — SIGNIFICANT CHANGE UP (ref 22–31)
CREAT SERPL-MCNC: 0.82 MG/DL — SIGNIFICANT CHANGE UP (ref 0.5–1.3)
GLUCOSE SERPL-MCNC: 135 MG/DL — HIGH (ref 70–99)
H PYLORI IGG SER-ACNC: 12 UNITS — SIGNIFICANT CHANGE UP
HCT VFR BLD CALC: 31.6 % — LOW (ref 34.5–45)
HCT VFR BLD CALC: 34 % — LOW (ref 34.5–45)
HGB BLD-MCNC: 10.1 G/DL — LOW (ref 11.5–15.5)
HGB BLD-MCNC: 10.8 G/DL — LOW (ref 11.5–15.5)
MCHC RBC-ENTMCNC: 26.9 PG — LOW (ref 27–34)
MCHC RBC-ENTMCNC: 27 PG — SIGNIFICANT CHANGE UP (ref 27–34)
MCHC RBC-ENTMCNC: 31.8 % — LOW (ref 32–36)
MCHC RBC-ENTMCNC: 32 % — SIGNIFICANT CHANGE UP (ref 32–36)
MCV RBC AUTO: 84.5 FL — SIGNIFICANT CHANGE UP (ref 80–100)
MCV RBC AUTO: 84.6 FL — SIGNIFICANT CHANGE UP (ref 80–100)
NRBC # FLD: 0 — SIGNIFICANT CHANGE UP
NRBC # FLD: 0 — SIGNIFICANT CHANGE UP
PLATELET # BLD AUTO: 363 K/UL — SIGNIFICANT CHANGE UP (ref 150–400)
PLATELET # BLD AUTO: 365 K/UL — SIGNIFICANT CHANGE UP (ref 150–400)
PMV BLD: 9.1 FL — SIGNIFICANT CHANGE UP (ref 7–13)
PMV BLD: 9.1 FL — SIGNIFICANT CHANGE UP (ref 7–13)
POTASSIUM SERPL-MCNC: 3.6 MMOL/L — SIGNIFICANT CHANGE UP (ref 3.5–5.3)
POTASSIUM SERPL-SCNC: 3.6 MMOL/L — SIGNIFICANT CHANGE UP (ref 3.5–5.3)
RBC # BLD: 3.74 M/UL — LOW (ref 3.8–5.2)
RBC # BLD: 4.02 M/UL — SIGNIFICANT CHANGE UP (ref 3.8–5.2)
RBC # FLD: 13.7 % — SIGNIFICANT CHANGE UP (ref 10.3–14.5)
RBC # FLD: 13.8 % — SIGNIFICANT CHANGE UP (ref 10.3–14.5)
SODIUM SERPL-SCNC: 138 MMOL/L — SIGNIFICANT CHANGE UP (ref 135–145)
WBC # BLD: 6.01 K/UL — SIGNIFICANT CHANGE UP (ref 3.8–10.5)
WBC # BLD: 6.5 K/UL — SIGNIFICANT CHANGE UP (ref 3.8–10.5)
WBC # FLD AUTO: 6.01 K/UL — SIGNIFICANT CHANGE UP (ref 3.8–10.5)
WBC # FLD AUTO: 6.5 K/UL — SIGNIFICANT CHANGE UP (ref 3.8–10.5)

## 2017-08-31 PROCEDURE — 99239 HOSP IP/OBS DSCHRG MGMT >30: CPT

## 2017-08-31 PROCEDURE — 99223 1ST HOSP IP/OBS HIGH 75: CPT | Mod: GC

## 2017-08-31 RX ORDER — ACARBOSE 25 MG/1
1 TABLET ORAL
Qty: 90 | Refills: 0 | OUTPATIENT
Start: 2017-08-31 | End: 2017-09-30

## 2017-08-31 RX ORDER — MELOXICAM 15 MG/1
1 TABLET ORAL
Qty: 0 | Refills: 0 | COMMUNITY

## 2017-08-31 RX ORDER — METOPROLOL TARTRATE 50 MG
1 TABLET ORAL
Qty: 60 | Refills: 0
Start: 2017-08-31 | End: 2017-09-30

## 2017-08-31 RX ORDER — ASPIRIN/CALCIUM CARB/MAGNESIUM 324 MG
1 TABLET ORAL
Qty: 0 | Refills: 0 | COMMUNITY

## 2017-08-31 RX ORDER — PANTOPRAZOLE SODIUM 20 MG/1
1 TABLET, DELAYED RELEASE ORAL
Qty: 60 | Refills: 0
Start: 2017-08-31 | End: 2017-09-30

## 2017-08-31 RX ORDER — PANTOPRAZOLE SODIUM 20 MG/1
1 TABLET, DELAYED RELEASE ORAL
Qty: 60 | Refills: 0 | OUTPATIENT
Start: 2017-08-31 | End: 2017-09-30

## 2017-08-31 RX ORDER — METFORMIN HYDROCHLORIDE 850 MG/1
1 TABLET ORAL
Qty: 60 | Refills: 0
Start: 2017-08-31 | End: 2017-09-30

## 2017-08-31 RX ORDER — SIMVASTATIN 20 MG/1
1 TABLET, FILM COATED ORAL
Qty: 30 | Refills: 0
Start: 2017-08-31 | End: 2017-09-30

## 2017-08-31 RX ORDER — PANTOPRAZOLE SODIUM 20 MG/1
1 TABLET, DELAYED RELEASE ORAL
Qty: 0 | Refills: 0 | COMMUNITY
Start: 2017-08-31

## 2017-08-31 RX ADMIN — PANTOPRAZOLE SODIUM 40 MILLIGRAM(S): 20 TABLET, DELAYED RELEASE ORAL at 16:13

## 2017-08-31 RX ADMIN — Medication 81 MILLIGRAM(S): at 11:15

## 2017-08-31 RX ADMIN — PANTOPRAZOLE SODIUM 40 MILLIGRAM(S): 20 TABLET, DELAYED RELEASE ORAL at 05:44

## 2017-08-31 RX ADMIN — Medication 20 MILLIGRAM(S): at 05:44

## 2017-08-31 RX ADMIN — Medication 100 MILLIGRAM(S): at 05:44

## 2017-08-31 NOTE — DISCHARGE NOTE ADULT - CARE PROVIDERS DIRECT ADDRESSES
[email or web address]

## 2017-08-31 NOTE — DISCHARGE NOTE ADULT - CARE PLAN
Principal Discharge DX:	Gastric ulcer  Goal:	You were diagnosed with a deep gastric ulcer  Instructions for follow-up, activity and diet:	-Please take your Protonix 40mg po twice a day as prescribed. This is VERY important as it prevents worsening of your ulcer and your pain  -Return to the hospital immediately if you develop worsening pain, notice blood in your stool, or notice blood in your vomit  -You have a gastroenterology appointment scheduled for 9/28/17 at 930am. Please attend this appointment as it is very important  -The [facility] Medicine clinic will be contacting you for a medicine appointment. They should seen a stool sample for H. pylori if you do not have another BM.  Secondary Diagnosis:	Pancreatic lesion  Goal:	MRI  Instructions for follow-up, activity and diet:	-You were found to have a lesion on your CT scan requiring further evaluation with MRI. When you follow-up with the GI and medicine clinic, ensure that an MRI is ordered for you  Secondary Diagnosis:	DM (diabetes mellitus)  Goal:	Take your medications  Instructions for follow-up, activity and diet:	-Take your medications as prescribed. Follow-up in the [facility] or with another medical provider  Secondary Diagnosis:	Chronic diastolic heart failure  Goal:	Eat a low salt diet  Instructions for follow-up, activity and diet:	-continue to take you enalapril, metoprolol, and zocor as prescribed. You should not take aspirin at this time given your significant ulcer. Principal Discharge DX:	Gastric ulcer  Goal:	You were diagnosed with a deep gastric ulcer  Instructions for follow-up, activity and diet:	-Please take your Protonix 40mg po twice a day as prescribed. This is VERY important as it prevents worsening of your ulcer and your pain  -Return to the hospital immediately if you develop worsening pain, notice blood in your stool, or notice blood in your vomit  -You have a gastroenterology appointment scheduled for 9/28/17 at 930am. Please attend this appointment as it is very important  -The [facility] Medicine clinic will be contacting you for a medicine appointment. They should seen a stool sample for H. pylori if you do not have another BM.  Secondary Diagnosis:	Pancreatic lesion  Goal:	MRI  Instructions for follow-up, activity and diet:	-You were found to have a lesion on your CT scan requiring further evaluation with MRI. When you follow-up with the GI and medicine clinic, ensure that an MRI is ordered for you  Secondary Diagnosis:	DM (diabetes mellitus)  Goal:	Take your medications  Instructions for follow-up, activity and diet:	-Take your medications as prescribed. Follow-up in the [facility] or with another medical provider  Secondary Diagnosis:	Chronic diastolic heart failure  Goal:	Eat a low salt diet  Instructions for follow-up, activity and diet:	-continue to take you enalapril, metoprolol, and zocor as prescribed. You should not take aspirin at this time given your significant ulcer. Principal Discharge DX:	Gastric ulcer  Goal:	You were diagnosed with a deep gastric ulcer  Instructions for follow-up, activity and diet:	-Please take your Protonix 40mg po twice a day as prescribed. This is VERY important as it prevents worsening of your ulcer and your pain  -Return to the hospital immediately if you develop worsening pain, notice blood in your stool, or notice blood in your vomit  -You have a gastroenterology appointment scheduled for 9/28/17 at 930am. Please attend this appointment as it is very important  -The [facility] Medicine clinic will be contacting you for a medicine appointment. They should seen a stool sample for H. pylori if you do not have another BM.  Secondary Diagnosis:	Pancreatic lesion  Goal:	MRI  Instructions for follow-up, activity and diet:	-You were found to have a lesion on your CT scan requiring further evaluation with MRI. When you follow-up with the GI and medicine clinic, ensure that an MRI is ordered for you  Secondary Diagnosis:	DM (diabetes mellitus)  Goal:	Take your medications  Instructions for follow-up, activity and diet:	-Take your medications as prescribed. Follow-up in the [facility] Medicine [facility] or with another medical provider  Secondary Diagnosis:	Chronic diastolic heart failure  Goal:	Eat a low salt diet  Instructions for follow-up, activity and diet:	-continue to take you enalapril, metoprolol, and Zocor as prescribed. You should not take aspirin at this time given your significant ulcer. Principal Discharge DX:	Gastric ulcer  Goal:	You were diagnosed with a deep gastric ulcer  Instructions for follow-up, activity and diet:	-Please take your Protonix 40mg po twice a day as prescribed. This is VERY important as it prevents worsening of your ulcer and your pain  -Return to the hospital immediately if you develop worsening pain, notice blood in your stool, or notice blood in your vomit  -You have a gastroenterology appointment scheduled for 9/28/17 at 930am. Please attend this appointment as it is very important  -The [facility] Medicine clinic will be contacting you for a medicine appointment. They should seen a stool sample for H. pylori if you do not have another BM.  Secondary Diagnosis:	Pancreatic lesion  Goal:	MRI  Instructions for follow-up, activity and diet:	-You were found to have a lesion on your CT scan requiring further evaluation with MRI. When you follow-up with the GI and medicine clinic, ensure that an MRI is ordered for you  Secondary Diagnosis:	DM (diabetes mellitus)  Goal:	Take your medications  Instructions for follow-up, activity and diet:	-Take your medications as prescribed. Follow-up in the [facility] or with another medical provider  Secondary Diagnosis:	Chronic diastolic heart failure  Goal:	Eat a low salt diet  Instructions for follow-up, activity and diet:	-continue to take you enalapril, metoprolol, and Zocor as prescribed. You should not take aspirin at this time given your significant ulcer. Principal Discharge DX:	Gastric ulcer  Goal:	You were diagnosed with a deep gastric ulcer  Instructions for follow-up, activity and diet:	-Please take your Protonix 40mg po twice a day as prescribed. This is VERY important as it prevents worsening of your ulcer and your pain  -Return to the hospital immediately if you develop worsening pain, notice blood in your stool, or notice blood in your vomit  -You have a gastroenterology appointment scheduled for 9/28/17 at 930am. Please attend this appointment as it is very important  -The [facility] Medicine clinic will be contacting you for a medicine appointment. They should seen a stool sample for H. pylori if you do not have another BM.  Secondary Diagnosis:	Pancreatic lesion  Goal:	MRI  Instructions for follow-up, activity and diet:	-You were found to have a lesion on your CT scan requiring further evaluation with MRI. When you follow-up with the GI and medicine clinic, ensure that an MRI is ordered for you  Secondary Diagnosis:	DM (diabetes mellitus)  Goal:	Take your medications  Instructions for follow-up, activity and diet:	-Take your medications as prescribed. Follow-up in the [facility] or with another medical provider  Secondary Diagnosis:	Chronic diastolic heart failure  Goal:	Eat a low salt diet  Instructions for follow-up, activity and diet:	-continue to take you enalapril, metoprolol, and Zocor as prescribed. You should not take aspirin at this time given your significant ulcer. Principal Discharge DX:	Gastric ulcer  Goal:	You were diagnosed with a deep gastric ulcer  Instructions for follow-up, activity and diet:	-Please take your Protonix 40mg po twice a day as prescribed. This is VERY important as it prevents worsening of your ulcer and your pain  -Return to the hospital immediately if you develop worsening pain, notice blood in your stool, or notice blood in your vomit  -You have a gastroenterology appointment scheduled for 9/28/17 at 930am. Please attend this appointment as it is very important  -The [facility] Medicine clinic will be contacting you for a medicine appointment. They should seen a stool sample for H. pylori if you do not have another BM.  Secondary Diagnosis:	Pancreatic lesion  Goal:	MRI  Instructions for follow-up, activity and diet:	-You were found to have a lesion on your CT scan requiring further evaluation with MRI. When you follow-up with the GI and medicine clinic, ensure that an MRI is ordered for you  Secondary Diagnosis:	DM (diabetes mellitus)  Goal:	Take your medications  Instructions for follow-up, activity and diet:	-Take your medications as prescribed. Follow-up in the [facility] Medicine Clinic or with another medical provider  Secondary Diagnosis:	Chronic diastolic heart failure  Goal:	Eat a low salt diet  Instructions for follow-up, activity and diet:	-continue to take you enalapril, metoprolol, and Zocor as prescribed. You should not take aspirin at this time given your significant ulcer. Principal Discharge DX:	Gastric ulcer  Goal:	You were diagnosed with a deep gastric ulcer  Instructions for follow-up, activity and diet:	-Please take your Protonix 40mg po twice a day as prescribed. This is VERY important as it prevents worsening of your ulcer and your pain  -Return to the hospital immediately if you develop worsening pain, notice blood in your stool, or notice blood in your vomit  -You have a gastroenterology appointment scheduled for 9/28/17 at 930am. Please attend this appointment as it is very important  -The [facility] Medicine clinic will be contacting you for a medicine appointment. They should seen a stool sample for H. pylori if you do not have another BM.  Secondary Diagnosis:	Pancreatic lesion  Goal:	MRI  Instructions for follow-up, activity and diet:	-You were found to have a lesion on your CT scan requiring further evaluation with MRI. When you follow-up with the GI and medicine clinic, ensure that an MRI is ordered for you  Secondary Diagnosis:	DM (diabetes mellitus)  Goal:	Take your medications  Instructions for follow-up, activity and diet:	-Take your medications as prescribed. Follow-up in the [facility] or with another medical provider  Secondary Diagnosis:	Chronic diastolic heart failure  Goal:	Eat a low salt diet  Instructions for follow-up, activity and diet:	-continue to take you enalapril, metoprolol, and Zocor as prescribed. You should not take aspirin at this time given your significant ulcer. Principal Discharge DX:	Gastric ulcer  Goal:	You were diagnosed with a deep gastric ulcer  Instructions for follow-up, activity and diet:	-Please take your Protonix 40mg po twice a day as prescribed. This is VERY important as it prevents worsening of your ulcer and your pain  -Return to the hospital immediately if you develop worsening pain, notice blood in your stool, or notice blood in your vomit  -You have a gastroenterology appointment scheduled for 9/28/17 at 930am. Please attend this appointment as it is very important  -The [facility] Medicine clinic will be contacting you for a medicine appointment. They should seen a stool sample for H. pylori if you do not have another BM.  Secondary Diagnosis:	Pancreatic lesion  Goal:	MRI  Instructions for follow-up, activity and diet:	-You were found to have a lesion on your CT scan requiring further evaluation with MRI. When you follow-up with the GI and medicine clinic, ensure that an MRI is ordered for you  Secondary Diagnosis:	DM (diabetes mellitus)  Goal:	Take your medications  Instructions for follow-up, activity and diet:	-Take your medications as prescribed. Follow-up in the [facility] or with another medical provider  Secondary Diagnosis:	Chronic diastolic heart failure  Goal:	Eat a low salt diet  Instructions for follow-up, activity and diet:	-continue to take you enalapril, metoprolol, and Zocor as prescribed. You should not take aspirin at this time given your significant ulcer. Principal Discharge DX:	Gastric ulcer  Goal:	You were diagnosed with a deep gastric ulcer  Instructions for follow-up, activity and diet:	-Please take your Protonix 40mg po twice a day as prescribed. This is VERY important as it prevents worsening of your ulcer and your pain  -Return to the hospital immediately if you develop worsening pain, notice blood in your stool, or notice blood in your vomit  -You have a gastroenterology appointment scheduled for 9/28/17 at 930am. Please attend this appointment as it is very important  -The [facility] Medicine clinic will be contacting you for a medicine appointment. They should seen a stool sample for H. pylori if you do not have another BM.  Secondary Diagnosis:	Pancreatic lesion  Goal:	MRI  Instructions for follow-up, activity and diet:	-You were found to have a lesion on your CT scan requiring further evaluation with MRI. When you follow-up with the GI and medicine clinic, ensure that an MRI is ordered for you  Secondary Diagnosis:	DM (diabetes mellitus)  Goal:	Take your medications  Instructions for follow-up, activity and diet:	-Take your medications as prescribed. Follow-up in the [facility] or with another medical provider  Secondary Diagnosis:	Chronic diastolic heart failure  Goal:	Eat a low salt diet  Instructions for follow-up, activity and diet:	-continue to take you enalapril, metoprolol, and Zocor as prescribed. You should not take aspirin at this time given your significant ulcer.

## 2017-08-31 NOTE — PROGRESS NOTE ADULT - SUBJECTIVE AND OBJECTIVE BOX
Patient is a 84y old  Female who presents with a chief complaint of epigastric pain x 3 days (29 Aug 2017 13:24)      SUBJECTIVE / OVERNIGHT EVENTS:    MEDICATIONS  (STANDING):  enalapril 20 milliGRAM(s) Oral two times a day  simvastatin 20 milliGRAM(s) Oral at bedtime  metoprolol 100 milliGRAM(s) Oral two times a day  insulin lispro (HumaLOG) corrective regimen sliding scale   SubCutaneous three times a day before meals  insulin lispro (HumaLOG) corrective regimen sliding scale   SubCutaneous at bedtime  dextrose 5%. 1000 milliLiter(s) (50 mL/Hr) IV Continuous <Continuous>  dextrose 50% Injectable 12.5 Gram(s) IV Push once  dextrose 50% Injectable 25 Gram(s) IV Push once  dextrose 50% Injectable 25 Gram(s) IV Push once  pantoprazole    Tablet 40 milliGRAM(s) Oral two times a day before meals    MEDICATIONS  (PRN):  dextrose Gel 1 Dose(s) Oral once PRN Blood Glucose LESS THAN 70 milliGRAM(s)/deciliter  glucagon  Injectable 1 milliGRAM(s) IntraMuscular once PRN Glucose LESS THAN 70 milligrams/deciliter      Vital Signs Last 24 Hrs  T(C): 37.4 (08-31-17 @ 05:39)  T(F): 99.4 (08-31-17 @ 05:39), Max: 99.4 (08-31-17 @ 05:39)  HR: 100 (08-31-17 @ 05:39) (65 - 100)  BP: 143/85 (08-31-17 @ 05:39)  BP(mean): --  RR: 18 (08-31-17 @ 05:39) (18 - 18)  SpO2: 96% (08-31-17 @ 05:39) (95% - 96%)  Wt(kg): --    CAPILLARY BLOOD GLUCOSE  144 (31 Aug 2017 12:06)  128 (31 Aug 2017 08:46)  180 (30 Aug 2017 21:47)  108 (30 Aug 2017 16:58)        I&O's Summary      PHYSICAL EXAM:  GENERAL: NAD, well-developed  HEAD:  Atraumatic, Normocephalic  EYES: EOMI, PERRLA, conjunctiva and sclera clear  NECK: Supple, No JVD  CHEST/LUNG: Clear to auscultation bilaterally; No wheeze  HEART: Regular rate and rhythm; No murmurs, rubs, or gallops  ABDOMEN: Soft, Nontender, Nondistended; Bowel sounds present  EXTREMITIES:  2+ Peripheral Pulses, No clubbing, cyanosis, or edema  PSYCH: AAOx3  NEUROLOGY: non-focal  SKIN: No rashes or lesions    LABS:                        10.1   6.50  )-----------( 365      ( 31 Aug 2017 07:14 )             31.6     H/H: 11.    08-31    138  |  99  |  7   ----------------------------<  135<H>  3.6   |  25  |  0.82    Ca    8.8      31 Aug 2017 07:14      RADIOLOGY & ADDITIONAL TESTS:    Imaging Personally Reviewed:    Consultant(s) Notes Reviewed:      Care Discussed with Consultants/Other Providers:    Assessment and Plan: Patient is a 84y old  Female who presents with a chief complaint of epigastric pain x 3 days (29 Aug 2017 13:24)      SUBJECTIVE / OVERNIGHT EVENTS: Still c/o epigastric pain. Had normal BM last night. Stated stool was collected but did not appear in lab. Denies any melena, BRBPR, hematemesis, nausea, vomiting.     MEDICATIONS  (STANDING):  enalapril 20 milliGRAM(s) Oral two times a day  simvastatin 20 milliGRAM(s) Oral at bedtime  metoprolol 100 milliGRAM(s) Oral two times a day  insulin lispro (HumaLOG) corrective regimen sliding scale   SubCutaneous three times a day before meals  insulin lispro (HumaLOG) corrective regimen sliding scale   SubCutaneous at bedtime  dextrose 5%. 1000 milliLiter(s) (50 mL/Hr) IV Continuous <Continuous>  dextrose 50% Injectable 12.5 Gram(s) IV Push once  dextrose 50% Injectable 25 Gram(s) IV Push once  dextrose 50% Injectable 25 Gram(s) IV Push once  pantoprazole    Tablet 40 milliGRAM(s) Oral two times a day before meals    MEDICATIONS  (PRN):  dextrose Gel 1 Dose(s) Oral once PRN Blood Glucose LESS THAN 70 milliGRAM(s)/deciliter  glucagon  Injectable 1 milliGRAM(s) IntraMuscular once PRN Glucose LESS THAN 70 milligrams/deciliter      Vital Signs Last 24 Hrs  T(C): 37.4 (08-31-17 @ 05:39)  T(F): 99.4 (08-31-17 @ 05:39), Max: 99.4 (08-31-17 @ 05:39)  HR: 100 (08-31-17 @ 05:39) (65 - 100)  BP: 143/85 (08-31-17 @ 05:39)  BP(mean): --  RR: 18 (08-31-17 @ 05:39) (18 - 18)  SpO2: 96% (08-31-17 @ 05:39) (95% - 96%)  Wt(kg): --    CAPILLARY BLOOD GLUCOSE  144 (31 Aug 2017 12:06)  128 (31 Aug 2017 08:46)  180 (30 Aug 2017 21:47)  108 (30 Aug 2017 16:58)      I&O's Summary      PHYSICAL EXAM:  GENERAL: NAD, well-developed  HEAD:  Atraumatic, Normocephalic  EYES: EOMI, PERRLA, conjunctiva and sclera clear  NECK: Supple, No JVD  CHEST/LUNG: Clear to auscultation bilaterally; No wheeze  HEART: Regular rate and rhythm; No murmurs, rubs, or gallops  ABDOMEN: Soft, Nontender, Nondistended; Bowel sounds present  EXTREMITIES:  2+ Peripheral Pulses, No clubbing, cyanosis, or edema  PSYCH: AAOx3  NEUROLOGY: non-focal  SKIN: No rashes or lesions    LABS:                        10.1   6.50  )-----------( 365      ( 31 Aug 2017 07:14 )             31.6     H/H: 11.    08-31    138  |  99  |  7   ----------------------------<  135<H>  3.6   |  25  |  0.82    Ca    8.8      31 Aug 2017 07:14      RADIOLOGY & ADDITIONAL TESTS:    Imaging Personally Reviewed:    Consultant(s) Notes Reviewed:      Care Discussed with Consultants/Other Providers:    Assessment and Plan: Patient is a 84y old  Female who presents with a chief complaint of epigastric pain x 3 days (29 Aug 2017 13:24)      SUBJECTIVE / OVERNIGHT EVENTS: Still c/o epigastric pain. Had normal BM last night. Stated stool was collected but did not appear in lab. Denies any melena, BRBPR, hematemesis, nausea, vomiting.     MEDICATIONS  (STANDING):  enalapril 20 milliGRAM(s) Oral two times a day  simvastatin 20 milliGRAM(s) Oral at bedtime  metoprolol 100 milliGRAM(s) Oral two times a day  insulin lispro (HumaLOG) corrective regimen sliding scale   SubCutaneous three times a day before meals  insulin lispro (HumaLOG) corrective regimen sliding scale   SubCutaneous at bedtime  dextrose 5%. 1000 milliLiter(s) (50 mL/Hr) IV Continuous <Continuous>  dextrose 50% Injectable 12.5 Gram(s) IV Push once  dextrose 50% Injectable 25 Gram(s) IV Push once  dextrose 50% Injectable 25 Gram(s) IV Push once  pantoprazole    Tablet 40 milliGRAM(s) Oral two times a day before meals    MEDICATIONS  (PRN):  dextrose Gel 1 Dose(s) Oral once PRN Blood Glucose LESS THAN 70 milliGRAM(s)/deciliter  glucagon  Injectable 1 milliGRAM(s) IntraMuscular once PRN Glucose LESS THAN 70 milligrams/deciliter      Vital Signs Last 24 Hrs  T(C): 37.4 (08-31-17 @ 05:39)  T(F): 99.4 (08-31-17 @ 05:39), Max: 99.4 (08-31-17 @ 05:39)  HR: 100 (08-31-17 @ 05:39) (65 - 100)  BP: 143/85 (08-31-17 @ 05:39)  BP(mean): --  RR: 18 (08-31-17 @ 05:39) (18 - 18)  SpO2: 96% (08-31-17 @ 05:39) (95% - 96%)  Wt(kg): --    CAPILLARY BLOOD GLUCOSE  144 (31 Aug 2017 12:06)  128 (31 Aug 2017 08:46)  180 (30 Aug 2017 21:47)  108 (30 Aug 2017 16:58)      I&O's Summary      PHYSICAL EXAM:  GENERAL: NAD, well-developed  HEAD:  Atraumatic, Normocephalic  EYES: EOMI, PERRLA, conjunctiva and sclera clear  NECK: Supple, No JVD  CHEST/LUNG: Clear to auscultation bilaterally; No wheeze  HEART: Regular rate and rhythm; No murmurs, rubs, or gallops  ABDOMEN: Soft, Nontender, Nondistended; Bowel sounds present  EXTREMITIES:  2+ Peripheral Pulses, No clubbing, cyanosis, or edema  PSYCH: AAOx3  NEUROLOGY: non-focal  SKIN: No rashes or lesions    LABS:                        10.1   6.50  )-----------( 365      ( 31 Aug 2017 07:14 )             31.6     H/H: 10.1 <--11.6 <--11.9    08-31    138  |  99  |  7   ----------------------------<  135<H>  3.6   |  25  |  0.82    Ca    8.8      31 Aug 2017 07:14      RADIOLOGY & ADDITIONAL TESTS:    Imaging Personally Reviewed:    Consultant(s) Notes Reviewed:  Surgery    Care Discussed with Consultants/Other Providers: Dr. Rojo - patient can be discharged with good GI f/u.     Assessment and Plan: Patient is a 84y old  Female who presents with a chief complaint of epigastric pain x 3 days (29 Aug 2017 13:24)      SUBJECTIVE / OVERNIGHT EVENTS: Still c/o epigastric pain. Had normal BM last night. Stated stool was collected but did not appear in lab. Denies any melena, BRBPR, hematemesis, nausea, vomiting.     MEDICATIONS  (STANDING):  enalapril 20 milliGRAM(s) Oral two times a day  simvastatin 20 milliGRAM(s) Oral at bedtime  metoprolol 100 milliGRAM(s) Oral two times a day  insulin lispro (HumaLOG) corrective regimen sliding scale   SubCutaneous three times a day before meals  insulin lispro (HumaLOG) corrective regimen sliding scale   SubCutaneous at bedtime  dextrose 5%. 1000 milliLiter(s) (50 mL/Hr) IV Continuous <Continuous>  dextrose 50% Injectable 12.5 Gram(s) IV Push once  dextrose 50% Injectable 25 Gram(s) IV Push once  dextrose 50% Injectable 25 Gram(s) IV Push once  pantoprazole    Tablet 40 milliGRAM(s) Oral two times a day before meals    MEDICATIONS  (PRN):  dextrose Gel 1 Dose(s) Oral once PRN Blood Glucose LESS THAN 70 milliGRAM(s)/deciliter  glucagon  Injectable 1 milliGRAM(s) IntraMuscular once PRN Glucose LESS THAN 70 milligrams/deciliter      Vital Signs Last 24 Hrs  T(C): 37.4 (08-31-17 @ 05:39)  T(F): 99.4 (08-31-17 @ 05:39), Max: 99.4 (08-31-17 @ 05:39)  HR: 100 (08-31-17 @ 05:39) (65 - 100)  BP: 143/85 (08-31-17 @ 05:39)  BP(mean): --  RR: 18 (08-31-17 @ 05:39) (18 - 18)  SpO2: 96% (08-31-17 @ 05:39) (95% - 96%)  Wt(kg): --    CAPILLARY BLOOD GLUCOSE  144 (31 Aug 2017 12:06)  128 (31 Aug 2017 08:46)  180 (30 Aug 2017 21:47)  108 (30 Aug 2017 16:58)      I&O's Summary      PHYSICAL EXAM:  GENERAL: NAD, well-developed  HEAD:  Atraumatic, Normocephalic  EYES: EOMI, PERRLA, conjunctiva and sclera clear  NECK: Supple, No JVD  CHEST/LUNG: Clear to auscultation bilaterally; No wheeze  HEART: Regular rate and rhythm; No murmurs, rubs, or gallops  ABDOMEN: Soft, mild epigastric pain, Nondistended; Bowel sounds present; no rebound; no guarding; no peritoneal signs appreciated  EXTREMITIES:  2+ Peripheral Pulses, No clubbing, cyanosis, or edema  PSYCH: AAOx3  NEUROLOGY: non-focal  SKIN: No rashes or lesions    LABS:                        10.1   6.50  )-----------( 365      ( 31 Aug 2017 07:14 )             31.6     H/H: 10.1 <--11.6 <--11.9    08-31    138  |  99  |  7   ----------------------------<  135<H>  3.6   |  25  |  0.82    Ca    8.8      31 Aug 2017 07:14      RADIOLOGY & ADDITIONAL TESTS:    Imaging Personally Reviewed:    Consultant(s) Notes Reviewed:  Surgery    Care Discussed with Consultants/Other Providers: Dr. Mohamud and Cruz - patient can be discharged with good GI f/u.     Assessment and Plan:

## 2017-08-31 NOTE — PROGRESS NOTE ADULT - PROBLEM SELECTOR PLAN 7
-patient already on appropriate CHF meds, though studies suggest BB, Asa, ACEi more appropriate for HFrEF  -Currently euvolemic at this time  -Will need outpatient cardiology f/u with possible eventual cath
-patient already on appropriate CHF meds, though studies suggest BB, Asa, ACEi more appropriate for HFrEF  -Currently euvolemic at this time  -Will hold Asa given significant gastric ulcer  -Will need outpatient cardiology f/u with possible eventual cath

## 2017-08-31 NOTE — DISCHARGE NOTE ADULT - PATIENT PORTAL LINK FT
“You can access the [facility] Patient Portal, offered by [facility], by registering with the following website: [email or web address]”

## 2017-08-31 NOTE — PROGRESS NOTE ADULT - PROBLEM SELECTOR PLAN 2
-GI recs appreciated, will need further outpatient evaluation with MRI
-GI recs appreciated, will need further outpatient evaluation with MRI

## 2017-08-31 NOTE — DISCHARGE NOTE ADULT - PROVIDER TOKENS
TOKEN:'[id number]',FREE:[LAST:[[facility]],PHONE:[phone]],FAX:[phone]   [phone]    -],ADDRESS:[[place] [address]]]

## 2017-08-31 NOTE — DISCHARGE NOTE ADULT - PLAN OF CARE
You were diagnosed with a deep gastric ulcer -Please take your Protonix 40mg po twice a day as prescribed. This is VERY important as it prevents worsening of your ulcer and your pain  -Return to the hospital immediately if you develop worsening pain, notice blood in your stool, or notice blood in your vomit  -You have a gastroenterology appointment scheduled for 9/28/17 at 930am. Please attend this appointment as it is very important  -The [facility] will be contacting you for a medicine appointment. They should seen a stool sample for H. pylori if you do not have another BM. MRI -You were found to have a lesion on your CT scan requiring further evaluation with MRI. When you follow-up with the GI and medicine clinic, ensure that an MRI is ordered for you Take your medications -Take your medications as prescribed. Follow-up in the [facility] or with another medical provider Eat a low salt diet -continue to take you enalapril, metoprolol, and zocor as prescribed. You should not take aspirin at this time given your significant ulcer. -continue to take you enalapril, metoprolol, and Zocor as prescribed. You should not take aspirin at this time given your significant ulcer.

## 2017-08-31 NOTE — DISCHARGE NOTE ADULT - MEDICATION SUMMARY - MEDICATIONS TO TAKE
I will START or STAY ON the medications listed below when I get home from the hospital:    acetaminophen 325 mg oral tablet  -- 2 tab(s) by mouth every 6 hours, As needed, Moderate Pain  -- Indication: For Pain Control    enalapril 20 mg oral tablet  -- 1 tab(s) by mouth 2 times a day  -- Indication: For High Blood Pressure    Precose 50 mg oral tablet  -- 1 tab(s) by mouth 3 times a day with meals  -- Indication: For Diabetes    simvastatin 20 mg oral tablet  -- 1 tab(s) by mouth once a day (at bedtime)  -- Indication: For High Cholesterol    metoprolol 100 mg oral tablet  -- 1 tab(s) by mouth 2 times a day  -- Indication: For Heart Disease    pantoprazole 40 mg oral delayed release tablet  -- 1 tab(s) by mouth 2 times a day (before meals)  -- Indication: For Ulcer    multivitamin  -- 1 tab(s) by mouth once a day  -- Indication: For Nutrition I will START or STAY ON the medications listed below when I get home from the hospital:    acetaminophen 325 mg oral tablet  -- 2 tab(s) by mouth every 6 hours, As needed, Moderate Pain  -- Indication: For Pain Control    enalapril 20 mg oral tablet  -- 1 tab(s) by mouth 2 times a day  -- Indication: For High blood pressure    Precose 50 mg oral tablet  -- 1 tab(s) by mouth 3 times a day with meals  -- Indication: For DM (diabetes mellitus)    simvastatin 20 mg oral tablet  -- 1 tab(s) by mouth once a day (at bedtime)  -- Indication: For Hyperlipemia    metoprolol 100 mg oral tablet  -- 1 tab(s) by mouth 2 times a day  -- Indication: For Essential hypertension    pantoprazole 40 mg oral delayed release tablet  -- 1 tab(s) by mouth 2 times a day (before meals)  -- Indication: For Gastric ulcer    multivitamin  -- 1 tab(s) by mouth once a day  -- Indication: For Nutrition I will START or STAY ON the medications listed below when I get home from the hospital:    acetaminophen 325 mg oral tablet  -- 2 tab(s) by mouth every 6 hours, As needed, Moderate Pain  -- Indication: For Pain Control    enalapril 20 mg oral tablet  -- 1 tab(s) by mouth 2 times a day  -- Indication: For High blood pressure    metFORMIN 500 mg oral tablet  -- 1 tab(s) by mouth 2 times a day  -- Check with your doctor before becoming pregnant.  Do not drink alcoholic beverages when taking this medication.  It is very important that you take or use this exactly as directed.  Do not skip doses or discontinue unless directed by your doctor.  Obtain medical advice before taking any non-prescription drugs as some may affect the action of this medication.  Take with food or milk.    -- Indication: For DM (diabetes mellitus)    simvastatin 20 mg oral tablet  -- 1 tab(s) by mouth once a day (at bedtime)  -- Indication: For Hyperlipemia    metoprolol 100 mg oral tablet  -- 1 tab(s) by mouth 2 times a day  -- Indication: For Essential hypertension    pantoprazole 40 mg oral delayed release tablet  -- 1 tab(s) by mouth 2 times a day (before meals)  -- Indication: For Gastric ulcer    multivitamin  -- 1 tab(s) by mouth once a day  -- Indication: For Nutrition

## 2017-08-31 NOTE — DISCHARGE NOTE ADULT - MEDICATION SUMMARY - MEDICATIONS TO STOP TAKING
I will STOP taking the medications listed below when I get home from the hospital:    amLODIPine 10 mg oral tablet  -- 1 tab(s) by mouth once a day    meloxicam 15 mg oral tablet  -- 1 tab(s) by mouth once a day    Aspirin Enteric Coated 81 mg oral delayed release tablet  -- 1 tab(s) by mouth once a day I will STOP taking the medications listed below when I get home from the hospital:    Precose 50 mg oral tablet  -- 1 tab(s) by mouth 3 times a day with meals    amLODIPine 10 mg oral tablet  -- 1 tab(s) by mouth once a day    meloxicam 15 mg oral tablet  -- 1 tab(s) by mouth once a day    Aspirin Enteric Coated 81 mg oral delayed release tablet  -- 1 tab(s) by mouth once a day

## 2017-08-31 NOTE — DIETITIAN INITIAL EVALUATION ADULT. - ADHERENCE
fair/Soft diet. She avoids hard fruits and vegetables. As per daughter; she limits juices and carbonated drinks from her diet. Checks her fingersticks twice daily and the numbers usually between 100-200 mg/dl. Pt. presents with HgbA1c 8.7%. Takes Multivitamins PTA.

## 2017-08-31 NOTE — PROGRESS NOTE ADULT - PROBLEM SELECTOR PLAN 1
-GI recs appreciated, will plan for EGD today to ensure this is PUD, pt denies any hematemesis, melena, BRBPR  -Will f/u EGD results, if patient has PUD, will need to evaluate risks vs benefits on Asa use
-Case d/w GI, stool Helicobacter antigen pending, IgG serum antibody in the lab  -Will c/w Protonix 40mg po BID  -Needs close GI f/u - appointment is on 9/28/17 at 930am  -[facility] also emailed to establish PCP f/u  -Will hold off on Asa at this time  -No plans for surgical intervention at this time  -Patient with slight drop in H/H. Repeat CBC.

## 2017-08-31 NOTE — DIETITIAN INITIAL EVALUATION ADULT. - NS AS NUTRI INTERV MEALS SNACK
1) Please change diet order to: Soft (consistent CHO, DASH/TLC) as per patient preferences. 2) Provide food preferences within diet consistency when requested by patient.

## 2017-08-31 NOTE — DISCHARGE NOTE ADULT - CARE PROVIDER_API CALL
Boo Arroyo), Gastroenterology; Internal Medicine  [address] [address] [place] [address]  [place] [address]  Phone: [phone]  Fax: [phone]    [facility],   [place] [address]  Phone: [phone]  Fax: (       -

## 2017-08-31 NOTE — DIETITIAN INITIAL EVALUATION ADULT. - OTHER INFO
Nutrition consult received for nausea/vomiting greater or equal 3 days PTA. Patient is 85 y/o Female with past medical history inclusive of DM, HTN, HLD who presented with epigastric pain and found to have gastric ulcer. Met with patient and her daughter at bedside. Pt reports good appetite and PO intake at present. She denies any nausea, vomiting, diarrhea or constipation at present. C/o of chewing difficulties with regular diet especially with hard fruits and dry rice, and prefers to have soft diet. Denies any swallowing problems. As per daughter; patient lost about 5# in the last couple of months but she gained it back. Pt has no known food allergies. RD reinforced consistent CHO diet. Food preferences obtained. RD remains available for any additional nutrition related recommendations.

## 2017-08-31 NOTE — DIETITIAN INITIAL EVALUATION ADULT. - PROBLEM SELECTOR PLAN 1
acute epigastric pain, likely due to gastric ulcer form use of NSAIDS. No sign of bleeding  c/w PPI, symptom control. Avoid NSAIDS  GI consult

## 2017-08-31 NOTE — PROGRESS NOTE ADULT - ASSESSMENT
85 yo F with HTN, DM2, HLD, p/w epigastric pain likely 2/2 undiagnosed PUD. TTE now reveals new chronic HFpEF.
83 yo F with HTN, DM2, HLD, p/w epigastric pain likely 2/2 undiagnosed PUD. TTE now reveals new chronic HFpEF.

## 2017-08-31 NOTE — DIETITIAN INITIAL EVALUATION ADULT. - PROBLEM SELECTOR PLAN 3
likely due to dehydration from decreased oral intake  IVF   monitor electrolytes   check urine osmo / electrolytes

## 2017-08-31 NOTE — PROGRESS NOTE ADULT - ATTENDING COMMENTS
I discussed the case with Dr. Mohamud and Dr. Arroyo of GI. If patient is able to tolerate po, CBC is stable and she is agreeable to close GI f/u on 9/28/17 at 930am will discharge her. Needs stool H. pylori sent if patient has BM. I left a message for patient's family to update them on plan of care.

## 2017-08-31 NOTE — PROGRESS NOTE ADULT - PROBLEM SELECTOR PLAN 4
-F.S. at goal at this time. Will c/w HISS  -Diabetic diet when patient is not NPO  -c/w enalapril and statin
-F.S. at goal at this time. Will c/w HISS  -Diabetic diet  -c/w enalapril and statin

## 2017-08-31 NOTE — PROGRESS NOTE ADULT - PROBLEM SELECTOR PLAN 3
-Likely 2/2 dehydration as this improved with IVF. Will dc IVF at this time as patient with some slight crackles on exam at this time  -Monitor BMP daily
-Likely 2/2 dehydration as this improved with IVF  -Now resolved

## 2017-08-31 NOTE — DISCHARGE NOTE ADULT - HOSPITAL COURSE
AT ADMISSION: 84F PMH of HTN, DM, HLD p/w epigastric abd pain x 3 days. Pt reports gradually worsening intermittent epigastric pain with associated nausea, NB/NB vomiting x 2, non-radiating. No obvious relieving or exacerbation factors. Denies diarrhea, CP, SOB, dysuria, fever. Has never had this pain before. Denies hx abd surgery. decreased oral intake for the last few days. While in ER, tachycardia but resolved. Afebrile. + leukocytosis, hyponatremia 125. Lipase / lactate WNL. CT-abd showed Nonperforated gastric antral ulcer.Pt was given morphine in ER. No abd pain when interviewed. Of note, she was supposed to be on omeprazole 20mg (daily) but stopped taking it on her own few weeks ago.  In relation to her other conditions, she thought that she was compliant with all her medications but when confirming with her outpatient pharmacy, apparently her precose 50mg TID ran out in June (which explains her elevated glucose levels) and she hasn’t refilled her amlodipine 10mg (daily) since April.     HOSPITAL COURSE: The patient was subsequently admitted to the Medicine Service. She was seen and evaluated by GI who recommended endoscopy to further characterize the patient's ulcer. She required an echo, however, prior to this, given concern for pulmonary HTN seen on her CT scan. Her TTE was negative for pulmonary HTN but did reveal stage I diastolic CHF. Patient was already previously on Asa, statin, BB, and ACEi. These medications, except for the aspirin, were kept on. The aspirin was held given her significant ulcer. Upon further review of the CT scan, the gastric ulcer was noted to be quite deep and thus endoscopy could not be performed 2/2 risk for perforation. Surgical evaluation was called for possible resection given the risks of possible perforation, but no surgical intervention was warranted given that it was unclear if the ulcer was malignant and the patient had not yet received medical management for it. H. pylori serum antigen was sent and H. pylori stool antigen ordered. Patient was continued on Protonix 40mg po BID as per GI recommendations. Patient will need close medical and GI f/u for her ulcer. [facility] was contacted to arrange for a medical follow-up for the patient. GI f/u was arranged for her on 9/28/17 at 930am. Patient will also need further outpatient w/u of her new diastolic CHF. She is on appropriate medications but will ultimately require ischemic w/u. She was cleared for GI for discharge. Her H/H was noted to be downtrending but repeat CBC showed XXXXXX AT ADMISSION: 84F PMH of HTN, DM, HLD p/w epigastric abd pain x 3 days. Pt reports gradually worsening intermittent epigastric pain with associated nausea, NB/NB vomiting x 2, non-radiating. No obvious relieving or exacerbation factors. Denies diarrhea, CP, SOB, dysuria, fever. Has never had this pain before. Denies hx abd surgery. decreased oral intake for the last few days. While in ER, tachycardia but resolved. Afebrile. + leukocytosis, hyponatremia 125. Lipase / lactate WNL. CT-abd showed Nonperforated gastric antral ulcer.Pt was given morphine in ER. No abd pain when interviewed. Of note, she was supposed to be on omeprazole 20mg (daily) but stopped taking it on her own few weeks ago.  In relation to her other conditions, she thought that she was compliant with all her medications but when confirming with her outpatient pharmacy, apparently her precose 50mg TID ran out in June (which explains her elevated glucose levels) and she hasn’t refilled her amlodipine 10mg (daily) since April.     HOSPITAL COURSE: The patient was subsequently admitted to the Medicine Service. She was seen and evaluated by GI who recommended endoscopy to further characterize the patient's ulcer. She required an echo, however, prior to this, given concern for pulmonary HTN seen on her CT scan. Her TTE was negative for pulmonary HTN but did reveal stage I diastolic CHF. Patient was already previously on Asa, statin, BB, and ACEi. These medications, except for the aspirin, were kept on. The aspirin was held given her significant ulcer. Upon further review of the CT scan, the gastric ulcer was noted to be quite deep and thus endoscopy could not be performed 2/2 risk for perforation. Surgical evaluation was called for possible resection given the risks of possible perforation, but no surgical intervention was warranted given that it was unclear if the ulcer was malignant and the patient had not yet received medical management for it. H. pylori serum antigen was sent and H. pylori stool antigen ordered. Patient was continued on Protonix 40mg po BID as per GI recommendations. Patient will need close medical and GI f/u for her ulcer. [facility] was contacted to arrange for a medical follow-up for the patient. GI f/u was arranged for her on 9/28/17 at 930am. Patient will also need further outpatient w/u of her new diastolic CHF. She is on appropriate medications but will ultimately require ischemic w/u. She was cleared for GI for discharge. Her H/H was noted to be downtrending but repeat CBC showed it to be stable. AT ADMISSION: 84F PMH of HTN, DM, HLD p/w epigastric abd pain x 3 days. Pt reports gradually worsening intermittent epigastric pain with associated nausea, NB/NB vomiting x 2, non-radiating. No obvious relieving or exacerbation factors. Denies diarrhea, CP, SOB, dysuria, fever. Has never had this pain before. Denies hx abd surgery. decreased oral intake for the last few days. While in ER, tachycardia but resolved. Afebrile. + leukocytosis, hyponatremia 125. Lipase / lactate WNL. CT-abd showed Nonperforated gastric antral ulcer.Pt was given morphine in ER. No abd pain when interviewed. Of note, she was supposed to be on omeprazole 20mg (daily) but stopped taking it on her own few weeks ago.  In relation to her other conditions, she thought that she was compliant with all her medications but when confirming with her outpatient pharmacy, apparently her precose 50mg TID ran out in June (which explains her elevated glucose levels) and she hasn’t refilled her amlodipine 10mg (daily) since April.     HOSPITAL COURSE: The patient was subsequently admitted to the Medicine Service. She was seen and evaluated by GI who recommended endoscopy to further characterize the patient's ulcer. She required an echo, however, prior to this, given concern for pulmonary HTN seen on her CT scan. Her TTE was negative for pulmonary HTN but did reveal stage I diastolic CHF. Patient was already previously on Asa, statin, BB, and ACEi. These medications, except for the aspirin, were kept on. The aspirin was held given her significant ulcer. Upon further review of the CT scan, the gastric ulcer was noted to be quite deep and thus endoscopy could not be performed 2/2 risk for perforation. Surgical evaluation was called for possible resection given the risks of possible perforation, but no surgical intervention was warranted given that it was unclear if the ulcer was malignant and the patient had not yet received medical management for it. H. pylori serum antigen was sent and H. pylori stool antigen ordered. Patient was continued on Protonix 40mg po BID as per GI recommendations. Patient will need close medical and GI f/u for her ulcer. [facility] was contacted to arrange for a medical follow-up for the patient. GI f/u was arranged for her on 9/28/17 at 930am. Patient will also need further outpatient w/u of her new diastolic CHF. She is on appropriate medications but will ultimately require ischemic w/u. She was cleared for GI for discharge. Her H/H was noted to be downtrending but repeat CBC showed it to be stable.    I spoke to the patient's daughter and patient at length prior to discharge. We discussed the importance of the patient taking her Protonix BID everyday to protect her stomach, the importance of GI f/u on 9/28/17, and the importance of seeing her PCP to obtain the Helicobacter pylori stool testing. Daughter did not recall the PCP's name but has it at home. I also provided her with a copy of her echocardiogram to bring to her PCP to discuss her diastolic CHF. I told her to refrain from Aspirin for now in light of the ulcer. Daughter expressed understanding and was agreeable to the plan. AT ADMISSION: 84F PMH of HTN, DM, HLD p/w epigastric abd pain x 3 days. Pt reports gradually worsening intermittent epigastric pain with associated nausea, NB/NB vomiting x 2, non-radiating. No obvious relieving or exacerbation factors. Denies diarrhea, CP, SOB, dysuria, fever. Has never had this pain before. Denies hx abd surgery. decreased oral intake for the last few days. While in ER, tachycardia but resolved. Afebrile. + leukocytosis, hyponatremia 125. Lipase / lactate WNL. CT-abd showed Nonperforated gastric antral ulcer.Pt was given morphine in ER. No abd pain when interviewed. Of note, she was supposed to be on omeprazole 20mg (daily) but stopped taking it on her own few weeks ago.  In relation to her other conditions, she thought that she was compliant with all her medications but when confirming with her outpatient pharmacy, apparently her precose 50mg TID ran out in June (which explains her elevated glucose levels) and she hasn’t refilled her amlodipine 10mg (daily) since April.     HOSPITAL COURSE: The patient was subsequently admitted to the Medicine Service. She was seen and evaluated by GI who recommended endoscopy to further characterize the patient's ulcer. She required an echo, however, prior to this, given concern for pulmonary HTN seen on her CT scan. Her TTE was negative for pulmonary HTN but did reveal stage I diastolic CHF. Patient was already previously on Asa, statin, BB, and ACEi. These medications, except for the aspirin, were kept on. The aspirin was held given her significant ulcer. Upon further review of the CT scan, the gastric ulcer was noted to be quite deep and thus endoscopy could not be performed 2/2 risk for perforation. Surgical evaluation was called for possible resection given the risks of possible perforation, but no surgical intervention was warranted given that it was unclear if the ulcer was malignant and the patient had not yet received medical management for it. H. pylori serum antigen was sent and H. pylori stool antigen ordered. Patient was continued on Protonix 40mg po BID as per GI recommendations. Patient will need close medical and GI f/u for her ulcer. [facility] was contacted to arrange for a medical follow-up for the patient. GI f/u was arranged for her on 9/28/17 at 930am. Patient will also need further outpatient w/u of her new diastolic CHF. She is on appropriate medications but will ultimately require ischemic w/u. She was cleared for GI for discharge. Her H/H was noted to be downtrending but repeat CBC showed it to be stable.    I spoke to the patient's daughter and patient at length prior to discharge. We discussed the importance of the patient taking her Protonix BID everyday to protect her stomach, the importance of GI f/u on 9/28/17, the importance of obtaining outpatient MRI for her pancreatic lesion, and the importance of seeing her PCP to obtain the Helicobacter pylori stool testing. Daughter did not recall the PCP's name but has it at home. I also provided her with a copy of her echocardiogram to bring to her PCP to discuss her diastolic CHF. I told her to refrain from Aspirin for now in light of the ulcer. Daughter expressed understanding and was agreeable to the plan. AT ADMISSION: 84F PMH of HTN, DM, HLD p/w epigastric abd pain x 3 days. Pt reports gradually worsening intermittent epigastric pain with associated nausea, NB/NB vomiting x 2, non-radiating. No obvious relieving or exacerbation factors. Denies diarrhea, CP, SOB, dysuria, fever. Has never had this pain before. Denies hx abd surgery. decreased oral intake for the last few days. While in ER, tachycardia but resolved. Afebrile. + leukocytosis, hyponatremia 125. Lipase / lactate WNL. CT-abd showed Nonperforated gastric antral ulcer.Pt was given morphine in ER. No abd pain when interviewed. Of note, she was supposed to be on omeprazole 20mg (daily) but stopped taking it on her own few weeks ago.  In relation to her other conditions, she thought that she was compliant with all her medications but when confirming with her outpatient pharmacy, apparently her precose 50mg TID ran out in June (which explains her elevated glucose levels) and she hasn’t refilled her amlodipine 10mg (daily) since April.     HOSPITAL COURSE: The patient was subsequently admitted to the Medicine Service. She was seen and evaluated by GI who recommended endoscopy to further characterize the patient's ulcer. She required an echo, however, prior to this, given concern for pulmonary HTN seen on her CT scan. Her TTE was negative for pulmonary HTN but did reveal stage I diastolic CHF. Patient was already previously on Asa, statin, BB, and ACEi. These medications, except for the aspirin, were kept on. The aspirin was held given her significant ulcer. Upon further review of the CT scan, the gastric ulcer was noted to be quite deep and thus endoscopy could not be performed 2/2 risk for perforation. Surgical evaluation was called for possible resection given the risks of possible perforation, but no surgical intervention was warranted given that it was unclear if the ulcer was malignant and the patient had not yet received medical management for it. H. pylori serum antigen was sent and H. pylori stool antigen ordered. Patient was continued on Protonix 40mg po BID as per GI recommendations. Patient will need close medical and GI f/u for her ulcer. [facility] was contacted to arrange for a medical follow-up for the patient. GI f/u was arranged for her on 9/28/17 at 930am. Patient will also need further outpatient w/u of her new diastolic CHF. She is on appropriate medications but will ultimately require ischemic w/u. She was cleared for GI for discharge. Her H/H was noted to be downtrending but repeat CBC showed it to be stable.    I spoke to the patient's daughter and patient at length prior to discharge. We discussed the importance of the patient taking her Protonix BID everyday to protect her stomach, the importance of GI f/u on 9/28/17, the importance of obtaining outpatient MRI for her pancreatic lesion, and the importance of seeing her PCP to obtain the Helicobacter pylori stool testing. Daughter did not recall the PCP's name but has it at home. I also provided her with a copy of her echocardiogram to bring to her PCP to discuss her diastolic CHF. I told her to refrain from Aspirin for now in light of the ulcer. Daughter expressed understanding and was agreeable to the plan.    Pt previously on Precose for DM, has had a change in insurance, Precose is not covered, Precose changed to metformin.

## 2017-09-02 LAB — H PYLORI AG STL QL: SIGNIFICANT CHANGE UP

## 2017-09-28 ENCOUNTER — OUTPATIENT (OUTPATIENT)
Dept: OUTPATIENT SERVICES | Facility: HOSPITAL | Age: 82
LOS: 1 days | End: 2017-09-28

## 2017-09-28 ENCOUNTER — APPOINTMENT (OUTPATIENT)
Dept: GASTROENTEROLOGY | Facility: HOSPITAL | Age: 82
End: 2017-09-28
Payer: MEDICAID

## 2017-09-28 VITALS — HEART RATE: 62 BPM | SYSTOLIC BLOOD PRESSURE: 173 MMHG | DIASTOLIC BLOOD PRESSURE: 90 MMHG | HEIGHT: 64 IN

## 2017-09-28 DIAGNOSIS — Z78.9 OTHER SPECIFIED HEALTH STATUS: ICD-10-CM

## 2017-09-28 DIAGNOSIS — E11.9 TYPE 2 DIABETES MELLITUS W/OUT COMPLICATIONS: ICD-10-CM

## 2017-09-28 DIAGNOSIS — Z80.0 FAMILY HISTORY OF MALIGNANT NEOPLASM OF DIGESTIVE ORGANS: ICD-10-CM

## 2017-09-28 DIAGNOSIS — I10 ESSENTIAL (PRIMARY) HYPERTENSION: ICD-10-CM

## 2017-09-28 DIAGNOSIS — K25.9 GASTRIC ULCER, UNSPECIFIED AS ACUTE OR CHRONIC, W/OUT HEMORRHAGE OR PERFORATION: ICD-10-CM

## 2017-09-28 DIAGNOSIS — Z12.11 ENCOUNTER FOR SCREENING FOR MALIGNANT NEOPLASM OF COLON: ICD-10-CM

## 2017-09-28 DIAGNOSIS — K25.9 GASTRIC ULCER, UNSPECIFIED AS ACUTE OR CHRONIC, WITHOUT HEMORRHAGE OR PERFORATION: ICD-10-CM

## 2017-09-28 PROCEDURE — 99203 OFFICE O/P NEW LOW 30 MIN: CPT | Mod: GC

## 2017-09-28 RX ORDER — ENALAPRIL MALEATE 20 MG/1
20 TABLET ORAL TWICE DAILY
Qty: 180 | Refills: 1 | Status: ACTIVE | COMMUNITY
Start: 2017-09-28

## 2017-09-28 RX ORDER — METFORMIN HYDROCHLORIDE 500 MG/1
500 TABLET, COATED ORAL TWICE DAILY
Qty: 60 | Refills: 3 | Status: ACTIVE | COMMUNITY
Start: 2017-09-28

## 2017-09-28 RX ORDER — METOPROLOL TARTRATE 100 MG/1
100 TABLET, FILM COATED ORAL
Qty: 60 | Refills: 1 | Status: ACTIVE | COMMUNITY
Start: 2017-09-28

## 2017-09-28 RX ORDER — PANTOPRAZOLE 40 MG/1
40 TABLET, DELAYED RELEASE ORAL TWICE DAILY
Qty: 1 | Refills: 2 | Status: ACTIVE | COMMUNITY
Start: 2017-09-28 | End: 1900-01-01

## 2017-09-28 RX ORDER — OMEPRAZOLE 20 MG/1
20 CAPSULE, DELAYED RELEASE ORAL TWICE DAILY
Qty: 180 | Refills: 1 | Status: DISCONTINUED | COMMUNITY
Start: 2017-09-28 | End: 2017-09-28

## 2017-09-28 RX ORDER — SIMVASTATIN 20 MG/1
20 TABLET, FILM COATED ORAL DAILY
Qty: 90 | Refills: 0 | Status: ACTIVE | COMMUNITY
Start: 2017-09-28

## 2018-01-04 ENCOUNTER — APPOINTMENT (OUTPATIENT)
Dept: GASTROENTEROLOGY | Facility: HOSPITAL | Age: 83
End: 2018-01-04

## 2018-07-23 PROBLEM — Z80.0 FAMILY HISTORY OF COLON CANCER: Status: INACTIVE | Noted: 2017-09-28

## 2018-07-24 NOTE — DISCHARGE NOTE ADULT - NSFTFSTATUSABRD_GEN_ALL_CORE
=================================================================



=================================================================

Datetime Report Generated by CPN: 2018 03:32

   

   

=================================================================

CURRENT ADMISSION

=================================================================

   

Chief Complaint:  Uterine Contractions

Indication for Induction:  Not Applicable

Admit Impression :  Term, Intrauterine Pregnancy; Obstetrical

   Complication

Admit Plan:  Admit to Unit; Initiate  Section Protocol

   

=================================================================

ALLERGIES

=================================================================

   

Medication Allergies:  No

Medication Allergies:  No Known Allergies (2018)

   

=================================================================

OBSTETRICAL HISTORY

=================================================================

   

EDC:  2018 00:00

:  4

Para:  3

Term:  3

:  0

SAB:  0

IAB:  0

Ectopic:  0

Living:  3

Cesareans:  1

VBACs:  0

Multiple Births:  0

Gestational Diabetes:  No

Rh Sensitization:  No

Incompetent Cervix:  No

JENNY:  No

Infertility:  No

ART Treatment:  No

Uterine Anomaly:  No

IUGR:  No

Hx Previous C/S:  No

Macrosomia:  No

Hx Loss/Stillborn:  No

PIH:  No

Hx  Death:  No

Placenta Previa/Abruption:  No

Depression/PP Depression:  No

PTL/PROM:  No

Post Partum Hemorrhage:  No

Current Pregnancy Procedures:  Ultrasound

Obstetrical History Comments:  G1- @ 41wks female  7lbs 13oz

   G2- @ 41wks female  7lbs 7oz

   C3-V-ybausdd for breech @ 40.6wks male  8lbs 9oz

   G4-Current

   

=================================================================

***SEE PRENATAL RECORDS***

=================================================================

   

Alcohol:  No

Marijuana :  No

Cocaine:  No

Other Illicit Drugs:  No

Cigarettes:  Current Everyday Smoker. [id number]

   

=================================================================

MEDICAL HISTORY

=================================================================

   

Diabetes:  No

Blood Transfusion:  No

Pulmonary Disease (Asthma, TB):  No

Breast Disease:  No

Hypertension:  No

Gyn Surgery:  No

Heart Disease:  No

Hosp/Surgery:  Yes

Autoimmune Disorder:  No

Anesthetic Complications:  No

Kidney Disease:  No

Abnormal Pap Smear:  Yes

Neuro/Epilepsy:  No

Psychiatric Disorders:  No

Other Medical Diseases:  No

Hepatitis/Liver Disease:  No

Significant Family History:  No

Varicosities/Phlebitis:  No

Trauma/Violence :  No

Thyroid Dysfunction:  No

Medical History Comments:  ASCUS w +HPV;  x 2;  X1-breech; 

   

=================================================================

INFECTIOUS HISTORY

=================================================================

   

Gonorrhea:  No

Genital Herpes:  No

Chlamydia:  No

Tuberculosis:  No

Syphilis:  No

Hepatitis:  No

HIV/AIDS Exposure:  No

Rash or Viral Illness:  No

HPV:  Yes

Infectious History Comments:  abnormal pap smear this pregnancy, +HPV

   

=================================================================

PHYSICAL EXAM

=================================================================

   

General:  Normal

HEENT:  Normal

Neurologic:  Normal

Thyroid:  Normal

Heart:  Normal

Lungs:  Normal

Breast:  Deferred

Back:  Normal

Abdomen:  Normal

Genitourinary Exam:  Normal

Extremities:  Normal

DTRs:  Normal

Pelvic Type:  Adequate

Vital Signs:  Reviewed

   

=================================================================

VAGINAL EXAM

=================================================================

   

Dilatation:  0

Effacement:  0

   

=================================================================

MEMBRANES

=================================================================

   

Pooling:  Negative

Membranes:  Intact

   

=================================================================

FETUS A

=================================================================

   

EGA:  39.4

Monitoring:  External US

FHR- Baseline:  130

Variability:  Moderate 6-25bpm

Accelerations:  10X10

FHR Category:  Category II

Fetal Presentation:  Vertex

Admit Comment:  Exam shows a fetus with some degree of heart block.  A

   very calcified placenta.  I did witness some fetal movement on the

   ultrasound.  The BPP from radiology is 2.  Due to the uncertain

   fetal status we will proceed with a c section.

   

=================================================================

PLANS FOR LABOR AND DELIVERY

=================================================================

   

Labor and Delivery:  None

Pain Management:  Epidural

Feeding Preference:  Formula

Benefit of Breast Feed Discussed:  Yes

Circumcision:  N/A

   

=================================================================

INFORMED CONSENT

=================================================================

   

Signature:  Electronically signed by Tika Ba MD (SMIDA) on

   2018 at 03:30  with User ID: [id number]
PATIENT NEEDS ASSISTANCE TO LEAVE RESIDENCE:

## 2019-08-07 ENCOUNTER — INPATIENT (INPATIENT)
Facility: HOSPITAL | Age: 84
LOS: 12 days | Discharge: NURSING, REHAB OR LONG-TERM CARE | End: 2019-08-20
Attending: INTERNAL MEDICINE | Admitting: INTERNAL MEDICINE
Payer: MEDICARE

## 2019-08-07 VITALS
TEMPERATURE: 100 F | HEIGHT: 64 IN | RESPIRATION RATE: 19 BRPM | OXYGEN SATURATION: 92 % | HEART RATE: 101 BPM | DIASTOLIC BLOOD PRESSURE: 95 MMHG | SYSTOLIC BLOOD PRESSURE: 158 MMHG | WEIGHT: 149.91 LBS

## 2019-08-07 LAB
ALBUMIN SERPL ELPH-MCNC: 4.2 G/DL — SIGNIFICANT CHANGE UP (ref 3.3–5)
ALP SERPL-CCNC: 205 U/L — HIGH (ref 40–120)
ALT FLD-CCNC: 448 U/L — HIGH (ref 12–78)
ANION GAP SERPL CALC-SCNC: 11 MMOL/L — SIGNIFICANT CHANGE UP (ref 5–17)
ANISOCYTOSIS BLD QL: SLIGHT — SIGNIFICANT CHANGE UP
APPEARANCE UR: CLEAR — SIGNIFICANT CHANGE UP
APTT BLD: 26.6 SEC — LOW (ref 27.5–36.3)
AST SERPL-CCNC: 1071 U/L — HIGH (ref 15–37)
BACTERIA # UR AUTO: ABNORMAL
BASOPHILS # BLD AUTO: 0 K/UL — SIGNIFICANT CHANGE UP (ref 0–0.2)
BASOPHILS NFR BLD AUTO: 0 % — SIGNIFICANT CHANGE UP (ref 0–2)
BILIRUB SERPL-MCNC: 1.5 MG/DL — HIGH (ref 0.2–1.2)
BILIRUB UR-MCNC: NEGATIVE — SIGNIFICANT CHANGE UP
BUN SERPL-MCNC: 17 MG/DL — SIGNIFICANT CHANGE UP (ref 7–23)
CALCIUM SERPL-MCNC: 9.6 MG/DL — SIGNIFICANT CHANGE UP (ref 8.5–10.1)
CHLORIDE SERPL-SCNC: 94 MMOL/L — LOW (ref 96–108)
CO2 SERPL-SCNC: 32 MMOL/L — HIGH (ref 22–31)
COLOR SPEC: YELLOW — SIGNIFICANT CHANGE UP
CREAT SERPL-MCNC: 0.96 MG/DL — SIGNIFICANT CHANGE UP (ref 0.5–1.3)
DIFF PNL FLD: NEGATIVE — SIGNIFICANT CHANGE UP
EOSINOPHIL # BLD AUTO: 0 K/UL — SIGNIFICANT CHANGE UP (ref 0–0.5)
EOSINOPHIL NFR BLD AUTO: 0 % — SIGNIFICANT CHANGE UP (ref 0–6)
EPI CELLS # UR: SIGNIFICANT CHANGE UP
GLUCOSE BLDC GLUCOMTR-MCNC: 167 MG/DL — HIGH (ref 70–99)
GLUCOSE SERPL-MCNC: 163 MG/DL — HIGH (ref 70–99)
GLUCOSE UR QL: NEGATIVE MG/DL — SIGNIFICANT CHANGE UP
HCT VFR BLD CALC: 36.9 % — SIGNIFICANT CHANGE UP (ref 34.5–45)
HGB BLD-MCNC: 12.1 G/DL — SIGNIFICANT CHANGE UP (ref 11.5–15.5)
INR BLD: 1.08 RATIO — SIGNIFICANT CHANGE UP (ref 0.88–1.16)
KETONES UR-MCNC: NEGATIVE — SIGNIFICANT CHANGE UP
LACTATE SERPL-SCNC: 5.1 MMOL/L — CRITICAL HIGH (ref 0.7–2)
LEUKOCYTE ESTERASE UR-ACNC: NEGATIVE — SIGNIFICANT CHANGE UP
LYMPHOCYTES # BLD AUTO: 0.57 K/UL — LOW (ref 1–3.3)
LYMPHOCYTES # BLD AUTO: 5 % — LOW (ref 13–44)
MANUAL SMEAR VERIFICATION: SIGNIFICANT CHANGE UP
MCHC RBC-ENTMCNC: 27.9 PG — SIGNIFICANT CHANGE UP (ref 27–34)
MCHC RBC-ENTMCNC: 32.8 GM/DL — SIGNIFICANT CHANGE UP (ref 32–36)
MCV RBC AUTO: 85.2 FL — SIGNIFICANT CHANGE UP (ref 80–100)
MONOCYTES # BLD AUTO: 0 K/UL — SIGNIFICANT CHANGE UP (ref 0–0.9)
MONOCYTES NFR BLD AUTO: 0 % — LOW (ref 2–14)
NEUTROPHILS # BLD AUTO: 10.86 K/UL — HIGH (ref 1.8–7.4)
NEUTROPHILS NFR BLD AUTO: 80 % — HIGH (ref 43–77)
NEUTS BAND # BLD: 15 % — HIGH (ref 0–8)
NITRITE UR-MCNC: NEGATIVE — SIGNIFICANT CHANGE UP
NRBC # BLD: 0 /100 — SIGNIFICANT CHANGE UP (ref 0–0)
NRBC # BLD: SIGNIFICANT CHANGE UP /100 WBCS (ref 0–0)
PH UR: 9 — HIGH (ref 5–8)
PLAT MORPH BLD: NORMAL — SIGNIFICANT CHANGE UP
PLATELET # BLD AUTO: 324 K/UL — SIGNIFICANT CHANGE UP (ref 150–400)
POTASSIUM SERPL-MCNC: 3.1 MMOL/L — LOW (ref 3.5–5.3)
POTASSIUM SERPL-SCNC: 3.1 MMOL/L — LOW (ref 3.5–5.3)
PROT SERPL-MCNC: 8.6 GM/DL — HIGH (ref 6–8.3)
PROT UR-MCNC: 15 MG/DL
PROTHROM AB SERPL-ACNC: 12.1 SEC — SIGNIFICANT CHANGE UP (ref 10–12.9)
RBC # BLD: 4.33 M/UL — SIGNIFICANT CHANGE UP (ref 3.8–5.2)
RBC # FLD: 14.8 % — HIGH (ref 10.3–14.5)
RBC BLD AUTO: SIGNIFICANT CHANGE UP
SODIUM SERPL-SCNC: 137 MMOL/L — SIGNIFICANT CHANGE UP (ref 135–145)
SP GR SPEC: 1.01 — SIGNIFICANT CHANGE UP (ref 1.01–1.02)
UROBILINOGEN FLD QL: NEGATIVE MG/DL — SIGNIFICANT CHANGE UP
WBC # BLD: 11.43 K/UL — HIGH (ref 3.8–10.5)
WBC # FLD AUTO: 11.43 K/UL — HIGH (ref 3.8–10.5)
WBC UR QL: SIGNIFICANT CHANGE UP

## 2019-08-07 PROCEDURE — 76700 US EXAM ABDOM COMPLETE: CPT | Mod: 26

## 2019-08-07 PROCEDURE — 71045 X-RAY EXAM CHEST 1 VIEW: CPT | Mod: 26

## 2019-08-07 PROCEDURE — 93010 ELECTROCARDIOGRAM REPORT: CPT

## 2019-08-07 PROCEDURE — 99285 EMERGENCY DEPT VISIT HI MDM: CPT

## 2019-08-07 RX ORDER — AMLODIPINE BESYLATE 2.5 MG/1
1 TABLET ORAL
Qty: 0 | Refills: 0 | DISCHARGE

## 2019-08-07 RX ORDER — ATORVASTATIN CALCIUM 80 MG/1
1 TABLET, FILM COATED ORAL
Qty: 0 | Refills: 0 | DISCHARGE

## 2019-08-07 RX ORDER — LORATADINE 10 MG/1
1 TABLET ORAL
Qty: 0 | Refills: 0 | DISCHARGE

## 2019-08-07 RX ORDER — SODIUM CHLORIDE 9 MG/ML
1000 INJECTION INTRAMUSCULAR; INTRAVENOUS; SUBCUTANEOUS ONCE
Refills: 0 | Status: COMPLETED | OUTPATIENT
Start: 2019-08-07 | End: 2019-08-07

## 2019-08-07 RX ORDER — ONDANSETRON 8 MG/1
4 TABLET, FILM COATED ORAL ONCE
Refills: 0 | Status: COMPLETED | OUTPATIENT
Start: 2019-08-07 | End: 2019-08-07

## 2019-08-07 RX ORDER — ACETAMINOPHEN 500 MG
650 TABLET ORAL ONCE
Refills: 0 | Status: COMPLETED | OUTPATIENT
Start: 2019-08-07 | End: 2019-08-07

## 2019-08-07 RX ORDER — METFORMIN HYDROCHLORIDE 850 MG/1
1 TABLET ORAL
Qty: 0 | Refills: 0 | DISCHARGE

## 2019-08-07 RX ORDER — IOHEXOL 300 MG/ML
30 INJECTION, SOLUTION INTRAVENOUS ONCE
Refills: 0 | Status: COMPLETED | OUTPATIENT
Start: 2019-08-07 | End: 2019-08-07

## 2019-08-07 RX ORDER — PANTOPRAZOLE SODIUM 20 MG/1
40 TABLET, DELAYED RELEASE ORAL ONCE
Refills: 0 | Status: COMPLETED | OUTPATIENT
Start: 2019-08-07 | End: 2019-08-07

## 2019-08-07 RX ADMIN — ONDANSETRON 4 MILLIGRAM(S): 8 TABLET, FILM COATED ORAL at 18:47

## 2019-08-07 RX ADMIN — IOHEXOL 30 MILLILITER(S): 300 INJECTION, SOLUTION INTRAVENOUS at 18:48

## 2019-08-07 RX ADMIN — Medication 650 MILLIGRAM(S): at 18:30

## 2019-08-07 RX ADMIN — PANTOPRAZOLE SODIUM 40 MILLIGRAM(S): 20 TABLET, DELAYED RELEASE ORAL at 18:48

## 2019-08-07 RX ADMIN — SODIUM CHLORIDE 1000 MILLILITER(S): 9 INJECTION INTRAMUSCULAR; INTRAVENOUS; SUBCUTANEOUS at 17:51

## 2019-08-07 NOTE — ED ADULT NURSE NOTE - NSIMPLEMENTINTERV_GEN_ALL_ED
Implemented All Fall Risk Interventions:  [place] to call system. Call bell, personal items and telephone within reach. Instruct patient to call for assistance. Room bathroom lighting operational. Non-slip footwear when patient is off stretcher. Physically safe environment: no spills, clutter or unnecessary equipment. Stretcher in lowest position, wheels locked, appropriate side rails in place. Provide visual cue, wrist band, yellow gown, etc. Monitor gait and stability. Monitor for mental status changes and reorient to person, place, and time. Review medications for side effects contributing to fall risk. Reinforce activity limits and safety measures with patient and family.

## 2019-08-07 NOTE — ED ADULT TRIAGE NOTE - CHIEF COMPLAINT QUOTE
patient BIBA c/o of abdominal and back pain with N/V started today , c/o of chills  denied chest pain denied difficulty breathing , denied diarrhea no constipation

## 2019-08-07 NOTE — ED PROVIDER NOTE - PROGRESS NOTE DETAILS
Patient signed out by Dr. Hadley pending imaging. imaging reviewed. patient now admitted to surgery for further management.

## 2019-08-07 NOTE — ED ADULT NURSE NOTE - OBJECTIVE STATEMENT
pt c/o upper abdominal pain radiating to back, vomiting x 3. denies diarrhea. ambulate with walker at baseline.

## 2019-08-07 NOTE — ED PROVIDER NOTE - OBJECTIVE STATEMENT
86 year old female presents today biba from home accompanied with her home attendant for c/o abdominal pain and vomiting, the patient points to her upper abdomen when asked where she is having pain, +vomiting x 3 +generalized weakness +fevers (-) diarrhea (-) chest pain (-) sob +decreased PO intake +chills

## 2019-08-08 DIAGNOSIS — K25.7 CHRONIC GASTRIC ULCER WITHOUT HEMORRHAGE OR PERFORATION: ICD-10-CM

## 2019-08-08 DIAGNOSIS — I50.32 CHRONIC DIASTOLIC (CONGESTIVE) HEART FAILURE: ICD-10-CM

## 2019-08-08 DIAGNOSIS — M16.0 BILATERAL PRIMARY OSTEOARTHRITIS OF HIP: ICD-10-CM

## 2019-08-08 DIAGNOSIS — E11.9 TYPE 2 DIABETES MELLITUS WITHOUT COMPLICATIONS: ICD-10-CM

## 2019-08-08 DIAGNOSIS — K86.2 CYST OF PANCREAS: ICD-10-CM

## 2019-08-08 DIAGNOSIS — E87.6 HYPOKALEMIA: ICD-10-CM

## 2019-08-08 DIAGNOSIS — E78.5 HYPERLIPIDEMIA, UNSPECIFIED: ICD-10-CM

## 2019-08-08 DIAGNOSIS — Z29.9 ENCOUNTER FOR PROPHYLACTIC MEASURES, UNSPECIFIED: ICD-10-CM

## 2019-08-08 DIAGNOSIS — I10 ESSENTIAL (PRIMARY) HYPERTENSION: ICD-10-CM

## 2019-08-08 LAB
ALBUMIN SERPL ELPH-MCNC: 2.8 G/DL — LOW (ref 3.3–5)
ALBUMIN SERPL ELPH-MCNC: 2.9 G/DL — LOW (ref 3.3–5)
ALP SERPL-CCNC: 161 U/L — HIGH (ref 40–120)
ALP SERPL-CCNC: 167 U/L — HIGH (ref 40–120)
ALT FLD-CCNC: 1056 U/L — HIGH (ref 12–78)
ALT FLD-CCNC: 924 U/L — HIGH (ref 12–78)
ANION GAP SERPL CALC-SCNC: 10 MMOL/L — SIGNIFICANT CHANGE UP (ref 5–17)
ANION GAP SERPL CALC-SCNC: 7 MMOL/L — SIGNIFICANT CHANGE UP (ref 5–17)
AST SERPL-CCNC: 1176 U/L — HIGH (ref 15–37)
AST SERPL-CCNC: 1819 U/L — HIGH (ref 15–37)
BILIRUB DIRECT SERPL-MCNC: 1.73 MG/DL — HIGH (ref 0.05–0.2)
BILIRUB DIRECT SERPL-MCNC: 2.25 MG/DL — HIGH (ref 0.05–0.2)
BILIRUB INDIRECT FLD-MCNC: 0.6 MG/DL — SIGNIFICANT CHANGE UP (ref 0.2–1)
BILIRUB INDIRECT FLD-MCNC: 0.6 MG/DL — SIGNIFICANT CHANGE UP (ref 0.2–1)
BILIRUB SERPL-MCNC: 2.3 MG/DL — HIGH (ref 0.2–1.2)
BILIRUB SERPL-MCNC: 2.8 MG/DL — HIGH (ref 0.2–1.2)
BLD GP AB SCN SERPL QL: SIGNIFICANT CHANGE UP
BUN SERPL-MCNC: 13 MG/DL — SIGNIFICANT CHANGE UP (ref 7–23)
BUN SERPL-MCNC: 17 MG/DL — SIGNIFICANT CHANGE UP (ref 7–23)
CALCIUM SERPL-MCNC: 8.2 MG/DL — LOW (ref 8.5–10.1)
CALCIUM SERPL-MCNC: 8.4 MG/DL — LOW (ref 8.5–10.1)
CHLORIDE SERPL-SCNC: 103 MMOL/L — SIGNIFICANT CHANGE UP (ref 96–108)
CHLORIDE SERPL-SCNC: 106 MMOL/L — SIGNIFICANT CHANGE UP (ref 96–108)
CO2 SERPL-SCNC: 26 MMOL/L — SIGNIFICANT CHANGE UP (ref 22–31)
CO2 SERPL-SCNC: 27 MMOL/L — SIGNIFICANT CHANGE UP (ref 22–31)
CREAT SERPL-MCNC: 0.76 MG/DL — SIGNIFICANT CHANGE UP (ref 0.5–1.3)
CREAT SERPL-MCNC: 0.94 MG/DL — SIGNIFICANT CHANGE UP (ref 0.5–1.3)
CULTURE RESULTS: SIGNIFICANT CHANGE UP
GLUCOSE SERPL-MCNC: 119 MG/DL — HIGH (ref 70–99)
GLUCOSE SERPL-MCNC: 99 MG/DL — SIGNIFICANT CHANGE UP (ref 70–99)
HCT VFR BLD CALC: 27.4 % — LOW (ref 34.5–45)
HCT VFR BLD CALC: 28.9 % — LOW (ref 34.5–45)
HGB BLD-MCNC: 9 G/DL — LOW (ref 11.5–15.5)
HGB BLD-MCNC: 9.4 G/DL — LOW (ref 11.5–15.5)
LACTATE SERPL-SCNC: 1.5 MMOL/L — SIGNIFICANT CHANGE UP (ref 0.7–2)
LACTATE SERPL-SCNC: 2.1 MMOL/L — HIGH (ref 0.7–2)
LACTATE SERPL-SCNC: 2.5 MMOL/L — HIGH (ref 0.7–2)
LIDOCAIN IGE QN: 163 U/L — SIGNIFICANT CHANGE UP (ref 73–393)
LIDOCAIN IGE QN: 203 U/L — SIGNIFICANT CHANGE UP (ref 73–393)
LIDOCAIN IGE QN: 318 U/L — SIGNIFICANT CHANGE UP (ref 73–393)
MAGNESIUM SERPL-MCNC: 1.6 MG/DL — SIGNIFICANT CHANGE UP (ref 1.6–2.6)
MAGNESIUM SERPL-MCNC: 1.6 MG/DL — SIGNIFICANT CHANGE UP (ref 1.6–2.6)
MCHC RBC-ENTMCNC: 27.4 PG — SIGNIFICANT CHANGE UP (ref 27–34)
MCHC RBC-ENTMCNC: 28.2 PG — SIGNIFICANT CHANGE UP (ref 27–34)
MCHC RBC-ENTMCNC: 32.5 GM/DL — SIGNIFICANT CHANGE UP (ref 32–36)
MCHC RBC-ENTMCNC: 32.8 GM/DL — SIGNIFICANT CHANGE UP (ref 32–36)
MCV RBC AUTO: 84.3 FL — SIGNIFICANT CHANGE UP (ref 80–100)
MCV RBC AUTO: 85.9 FL — SIGNIFICANT CHANGE UP (ref 80–100)
NRBC # BLD: 0 /100 WBCS — SIGNIFICANT CHANGE UP (ref 0–0)
NRBC # BLD: 0 /100 WBCS — SIGNIFICANT CHANGE UP (ref 0–0)
PHOSPHATE SERPL-MCNC: 2.6 MG/DL — SIGNIFICANT CHANGE UP (ref 2.5–4.5)
PHOSPHATE SERPL-MCNC: 3.5 MG/DL — SIGNIFICANT CHANGE UP (ref 2.5–4.5)
PLATELET # BLD AUTO: 227 K/UL — SIGNIFICANT CHANGE UP (ref 150–400)
PLATELET # BLD AUTO: 246 K/UL — SIGNIFICANT CHANGE UP (ref 150–400)
POTASSIUM SERPL-MCNC: 3.4 MMOL/L — LOW (ref 3.5–5.3)
POTASSIUM SERPL-MCNC: 3.9 MMOL/L — SIGNIFICANT CHANGE UP (ref 3.5–5.3)
POTASSIUM SERPL-SCNC: 3.4 MMOL/L — LOW (ref 3.5–5.3)
POTASSIUM SERPL-SCNC: 3.9 MMOL/L — SIGNIFICANT CHANGE UP (ref 3.5–5.3)
PROT SERPL-MCNC: 6.1 GM/DL — SIGNIFICANT CHANGE UP (ref 6–8.3)
PROT SERPL-MCNC: 6.2 GM/DL — SIGNIFICANT CHANGE UP (ref 6–8.3)
RBC # BLD: 3.19 M/UL — LOW (ref 3.8–5.2)
RBC # BLD: 3.43 M/UL — LOW (ref 3.8–5.2)
RBC # FLD: 14.9 % — HIGH (ref 10.3–14.5)
RBC # FLD: 15.1 % — HIGH (ref 10.3–14.5)
SODIUM SERPL-SCNC: 139 MMOL/L — SIGNIFICANT CHANGE UP (ref 135–145)
SODIUM SERPL-SCNC: 140 MMOL/L — SIGNIFICANT CHANGE UP (ref 135–145)
SPECIMEN SOURCE: SIGNIFICANT CHANGE UP
WBC # BLD: 16.75 K/UL — HIGH (ref 3.8–10.5)
WBC # BLD: 17.05 K/UL — HIGH (ref 3.8–10.5)
WBC # FLD AUTO: 16.75 K/UL — HIGH (ref 3.8–10.5)
WBC # FLD AUTO: 17.05 K/UL — HIGH (ref 3.8–10.5)

## 2019-08-08 PROCEDURE — 99223 1ST HOSP IP/OBS HIGH 75: CPT

## 2019-08-08 PROCEDURE — 93306 TTE W/DOPPLER COMPLETE: CPT | Mod: 26

## 2019-08-08 PROCEDURE — 74181 MRI ABDOMEN W/O CONTRAST: CPT | Mod: 26

## 2019-08-08 RX ORDER — GLUCAGON INJECTION, SOLUTION 0.5 MG/.1ML
1 INJECTION, SOLUTION SUBCUTANEOUS ONCE
Refills: 0 | Status: DISCONTINUED | OUTPATIENT
Start: 2019-08-08 | End: 2019-08-20

## 2019-08-08 RX ORDER — ACETAMINOPHEN 500 MG
650 TABLET ORAL EVERY 6 HOURS
Refills: 0 | Status: DISCONTINUED | OUTPATIENT
Start: 2019-08-08 | End: 2019-08-20

## 2019-08-08 RX ORDER — DEXTROSE MONOHYDRATE, SODIUM CHLORIDE, AND POTASSIUM CHLORIDE 50; .745; 4.5 G/1000ML; G/1000ML; G/1000ML
1000 INJECTION, SOLUTION INTRAVENOUS
Refills: 0 | Status: DISCONTINUED | OUTPATIENT
Start: 2019-08-08 | End: 2019-08-10

## 2019-08-08 RX ORDER — POTASSIUM CHLORIDE 20 MEQ
10 PACKET (EA) ORAL
Refills: 0 | Status: COMPLETED | OUTPATIENT
Start: 2019-08-08 | End: 2019-08-08

## 2019-08-08 RX ORDER — POTASSIUM CHLORIDE 20 MEQ
10 PACKET (EA) ORAL
Refills: 0 | Status: DISCONTINUED | OUTPATIENT
Start: 2019-08-08 | End: 2019-08-08

## 2019-08-08 RX ORDER — SODIUM CHLORIDE 9 MG/ML
1000 INJECTION, SOLUTION INTRAVENOUS ONCE
Refills: 0 | Status: COMPLETED | OUTPATIENT
Start: 2019-08-08 | End: 2019-08-08

## 2019-08-08 RX ORDER — LORATADINE 10 MG/1
10 TABLET ORAL DAILY
Refills: 0 | Status: DISCONTINUED | OUTPATIENT
Start: 2019-08-08 | End: 2019-08-20

## 2019-08-08 RX ORDER — INSULIN LISPRO 100/ML
VIAL (ML) SUBCUTANEOUS AT BEDTIME
Refills: 0 | Status: DISCONTINUED | OUTPATIENT
Start: 2019-08-08 | End: 2019-08-20

## 2019-08-08 RX ORDER — INSULIN LISPRO 100/ML
VIAL (ML) SUBCUTANEOUS
Refills: 0 | Status: DISCONTINUED | OUTPATIENT
Start: 2019-08-08 | End: 2019-08-20

## 2019-08-08 RX ORDER — SODIUM CHLORIDE 9 MG/ML
500 INJECTION, SOLUTION INTRAVENOUS ONCE
Refills: 0 | Status: COMPLETED | OUTPATIENT
Start: 2019-08-08 | End: 2019-08-08

## 2019-08-08 RX ORDER — ATORVASTATIN CALCIUM 80 MG/1
20 TABLET, FILM COATED ORAL AT BEDTIME
Refills: 0 | Status: DISCONTINUED | OUTPATIENT
Start: 2019-08-08 | End: 2019-08-20

## 2019-08-08 RX ORDER — DEXTROSE 50 % IN WATER 50 %
15 SYRINGE (ML) INTRAVENOUS ONCE
Refills: 0 | Status: DISCONTINUED | OUTPATIENT
Start: 2019-08-08 | End: 2019-08-20

## 2019-08-08 RX ORDER — DEXTROSE 50 % IN WATER 50 %
25 SYRINGE (ML) INTRAVENOUS ONCE
Refills: 0 | Status: DISCONTINUED | OUTPATIENT
Start: 2019-08-08 | End: 2019-08-20

## 2019-08-08 RX ORDER — HEPARIN SODIUM 5000 [USP'U]/ML
5000 INJECTION INTRAVENOUS; SUBCUTANEOUS EVERY 12 HOURS
Refills: 0 | Status: DISCONTINUED | OUTPATIENT
Start: 2019-08-08 | End: 2019-08-20

## 2019-08-08 RX ORDER — POTASSIUM CHLORIDE 20 MEQ
20 PACKET (EA) ORAL
Refills: 0 | Status: DISCONTINUED | OUTPATIENT
Start: 2019-08-08 | End: 2019-08-08

## 2019-08-08 RX ORDER — SODIUM CHLORIDE 9 MG/ML
1000 INJECTION, SOLUTION INTRAVENOUS
Refills: 0 | Status: DISCONTINUED | OUTPATIENT
Start: 2019-08-08 | End: 2019-08-20

## 2019-08-08 RX ORDER — METOPROLOL TARTRATE 50 MG
100 TABLET ORAL
Refills: 0 | Status: DISCONTINUED | OUTPATIENT
Start: 2019-08-08 | End: 2019-08-20

## 2019-08-08 RX ORDER — DEXTROSE 50 % IN WATER 50 %
12.5 SYRINGE (ML) INTRAVENOUS ONCE
Refills: 0 | Status: DISCONTINUED | OUTPATIENT
Start: 2019-08-08 | End: 2019-08-20

## 2019-08-08 RX ORDER — PIPERACILLIN AND TAZOBACTAM 4; .5 G/20ML; G/20ML
3.38 INJECTION, POWDER, LYOPHILIZED, FOR SOLUTION INTRAVENOUS EVERY 8 HOURS
Refills: 0 | Status: DISCONTINUED | OUTPATIENT
Start: 2019-08-08 | End: 2019-08-20

## 2019-08-08 RX ORDER — AMLODIPINE BESYLATE 2.5 MG/1
10 TABLET ORAL DAILY
Refills: 0 | Status: DISCONTINUED | OUTPATIENT
Start: 2019-08-08 | End: 2019-08-20

## 2019-08-08 RX ORDER — PANTOPRAZOLE SODIUM 20 MG/1
40 TABLET, DELAYED RELEASE ORAL
Refills: 0 | Status: DISCONTINUED | OUTPATIENT
Start: 2019-08-08 | End: 2019-08-20

## 2019-08-08 RX ORDER — ONDANSETRON 8 MG/1
4 TABLET, FILM COATED ORAL EVERY 6 HOURS
Refills: 0 | Status: DISCONTINUED | OUTPATIENT
Start: 2019-08-08 | End: 2019-08-20

## 2019-08-08 RX ADMIN — HEPARIN SODIUM 5000 UNIT(S): 5000 INJECTION INTRAVENOUS; SUBCUTANEOUS at 06:28

## 2019-08-08 RX ADMIN — SODIUM CHLORIDE 1000 MILLILITER(S): 9 INJECTION, SOLUTION INTRAVENOUS at 04:08

## 2019-08-08 RX ADMIN — Medication 20 MILLIGRAM(S): at 17:13

## 2019-08-08 RX ADMIN — Medication 650 MILLIGRAM(S): at 23:08

## 2019-08-08 RX ADMIN — Medication 100 MILLIEQUIVALENT(S): at 06:38

## 2019-08-08 RX ADMIN — HEPARIN SODIUM 5000 UNIT(S): 5000 INJECTION INTRAVENOUS; SUBCUTANEOUS at 17:13

## 2019-08-08 RX ADMIN — Medication 20 MILLIEQUIVALENT(S): at 04:59

## 2019-08-08 RX ADMIN — PIPERACILLIN AND TAZOBACTAM 25 GRAM(S): 4; .5 INJECTION, POWDER, LYOPHILIZED, FOR SOLUTION INTRAVENOUS at 06:28

## 2019-08-08 RX ADMIN — SODIUM CHLORIDE 1000 MILLILITER(S): 9 INJECTION, SOLUTION INTRAVENOUS at 09:19

## 2019-08-08 RX ADMIN — Medication 100 MILLIEQUIVALENT(S): at 09:20

## 2019-08-08 RX ADMIN — DEXTROSE MONOHYDRATE, SODIUM CHLORIDE, AND POTASSIUM CHLORIDE 120 MILLILITER(S): 50; .745; 4.5 INJECTION, SOLUTION INTRAVENOUS at 22:08

## 2019-08-08 RX ADMIN — SODIUM CHLORIDE 500 MILLILITER(S): 9 INJECTION, SOLUTION INTRAVENOUS at 05:12

## 2019-08-08 RX ADMIN — PIPERACILLIN AND TAZOBACTAM 25 GRAM(S): 4; .5 INJECTION, POWDER, LYOPHILIZED, FOR SOLUTION INTRAVENOUS at 22:08

## 2019-08-08 RX ADMIN — PANTOPRAZOLE SODIUM 40 MILLIGRAM(S): 20 TABLET, DELAYED RELEASE ORAL at 09:03

## 2019-08-08 RX ADMIN — Medication 650 MILLIGRAM(S): at 18:00

## 2019-08-08 RX ADMIN — PIPERACILLIN AND TAZOBACTAM 25 GRAM(S): 4; .5 INJECTION, POWDER, LYOPHILIZED, FOR SOLUTION INTRAVENOUS at 14:13

## 2019-08-08 RX ADMIN — Medication 650 MILLIGRAM(S): at 17:12

## 2019-08-08 RX ADMIN — Medication 100 MILLIEQUIVALENT(S): at 12:36

## 2019-08-08 RX ADMIN — Medication 100 MILLIGRAM(S): at 17:13

## 2019-08-08 RX ADMIN — Medication 100 MILLIEQUIVALENT(S): at 11:34

## 2019-08-08 RX ADMIN — ATORVASTATIN CALCIUM 20 MILLIGRAM(S): 80 TABLET, FILM COATED ORAL at 22:07

## 2019-08-08 NOTE — CHART NOTE - NSCHARTNOTEFT_GEN_A_CORE
F/U labs                          9.4    16.75 )-----------( 246      ( 08 Aug 2019 15:01 )             28.9       08-08    139  |  106  |  13  ----------------------------<  119<H>  3.9   |  26  |  0.76    Ca    8.4<L>      08 Aug 2019 15:01  Phos  2.6     08-08  Mg     1.6     08-08    TPro  6.2  /  Alb  2.9<L>  /  TBili  2.8<H>  /  DBili  2.25<H>  /  AST  1176<H>  /  ALT  924<H>  /  AlkPhos  161<H>  08-08    Lipase, Serum (08.08.19 @ 15:01)    Lipase, Serum: 163 U/L    Lactate, Blood (08.08.19 @ 15:01)    Lactate, Blood: 1.5 mmol/L      MRCP not done at this time.  MRCP jose ramon Angus states pt was called for earlier. However, the floor said he was getting an IV & could not be sent down. Pt will be called down after resent pt on MRI table is done - about 1.5 hours.    Above discussed with Dr. Rowell, via phone. Await MRCP to determine if urgent cholecystectomy is needed.

## 2019-08-08 NOTE — H&P ADULT - HISTORY OF PRESENT ILLNESS
85 y/o female PMHx DM, HTN, HLD, OA, CHF, gastric ulcer presents to the ER c/o periumbilical/epigastric pain since 4pm. Patient reports that pain was previously severe but now the pain has completely resolved without any analgesics in the ER. Reports 4 episodes of NBNB emesis after drinking ginger ale and water. Reports normal BM/flatus. Last BM was yesterday. Denies abdominal pain after eating. Patient denies fever, chills, nausea, vomiting, constipation, diarrhea, hematuria, melena, hematochezia, chest pain, shortness of breath, dizziness. Patient denies prior incident.

## 2019-08-08 NOTE — CONSULT NOTE ADULT - ASSESSMENT
85 y/o female PMHx HTN, HLD, DM, OA, chronic diastolic CHF with preserved LVEF, gastric ulcer presents to the ER c/o periumbilical/epigastric pain.   Admitted with sepsis, elevated LFTs with suspicion for acute cholecystitis due to biliary calculus, r/o gallstone pancreatitis and choledocholithiasis.   Patient resting comfortably, denies abdominal pain, nausea/vomiting. Offers no complaints. Denies chest pain, sob, dizziness.  HCT 37 -> 29  WBC 11 -> 17 -> 16  K 4.0  ECG: sinus tach 111; RBBB; inferior and inferolateral ST depressions    -2D echo pending to re-eval LVEF in the setting of IVF needs; currently does not appear to be in HF  -cont IVFs as per surgery  -on abx for cholecystitis/pancreatitis  -cont statin/metoprolol/enalapril  -remains tachycardic... IVFs as above.  HCT down 8 points.... would repeat and monitor closely.  ?bleeding  -will follow with you

## 2019-08-08 NOTE — H&P ADULT - PROBLEM SELECTOR PLAN 1
-Admit patient  -NPO, IV hydration  -IV antibiotics  -F/u AM LFTs and labs. If still elevated, will consider an MRCP.  -F/u repeat lactate  -No pain medication ordered since patient's pain improves.  -DVT ppx  -Discussed with Dr. Rowell

## 2019-08-08 NOTE — H&P ADULT - NSICDXPASTMEDICALHX_GEN_ALL_CORE_FT
PAST MEDICAL HISTORY:  Chronic hip pain     Chronic shoulder pain     DM (diabetes mellitus)     HTN (hypertension)     Hyperlipemia     Osteoarthritis

## 2019-08-08 NOTE — CHART NOTE - NSCHARTNOTEFT_GEN_A_CORE
< from: MR MRCP No Cont (08.08.19 @ 18:18) >      EXAM:  MR MRCP                            PROCEDURE DATE:  08/08/2019          INTERPRETATION:  CLINICAL INFORMATION: Elevated liver function tests    COMPARISON: Ultrasound dated 8/7/2019    PROCEDURE:     The following sequences were obtained:  1. Axial LAVA, Dualecho in and out-of-phase, 2D FIESTA, and T2 SSFSE  2. Coronal T2 SSFSE  3. 3D and radial MRCP    FINDINGS:     There are no focal hepatic lesions identified.  No intra or extrahepatic   biliary dilatation is noted. The CBD measures 7mm. No intraductal   biliary calculi are identified. The gallbladder is visualized and   contains stones.     There is a cystic pancreatic body lesion measuring 1 cm, possibly chronic   pseudocyst or intraductal papillary mucinous neoplasm. The pancreas   otherwise demonstrates normal T1 signal. The pancreatic duct is normal in   caliber. A periampullary duodenal diverticulum is noted.    There are renal cysts measuring up to 2.5 cm. The spleen, adrenal glands,   and kidneys are otherwise unremarkable in appearance.      There is no retroperitoneal adenopathy. Prominent retroperitoneal lymph   nodes are noted. There is trace ascites.      IMPRESSION:      Cholelithiasis.  No choledocholithiasis or biliary ductal dilatation.  1 cm cystic pancreatic body lesion, possibly chronic pseudocyst or   intraductal papillary mucinous neoplasm.        SULTANA LOVE M.D., ATTENDING RADIOLOGIST  This document has been electronically signed. Aug  8 2019  6:28PM         results were relayed  to Sorin Vargas.

## 2019-08-08 NOTE — H&P ADULT - ATTENDING COMMENTS
I saw and examined the patient and agree with the findings. I have reviewed the laboratory and imaging findings. At this time differential includes gallstone pancreatitis vs choledocholithiasis vs acute cholecystitis (?Miritizzi Syndrome). Start IV ABx, trend CBC/LFTs, obtain MRCP for assessment for possible stone. At this time patient does not have pain/tenderness - will also obtain Cardiology evaluation for possibility of procedures.

## 2019-08-08 NOTE — H&P ADULT - NSHPLABSRESULTS_GEN_ALL_CORE
12.1   11.43 )-----------( 324      ( 07 Aug 2019 17:52 )             36.9   08-07    137  |  94<L>  |  17  ----------------------------<  163<H>  3.1<L>   |  32<H>  |  0.96    Ca    9.6      07 Aug 2019 17:52    TPro  8.6<H>  /  Alb  4.2  /  TBili  1.5<H>  /  DBili  x   /  AST  1071<H>  /  ALT  448<H>  /  AlkPhos  205<H>  08-07      < from: US Abdomen Complete (08.07.19 @ 23:58) >    IMPRESSION:     Gallstones with mild wall thickening and pericholecystic fluid. Negative   sonographic Rock's sign. Findings suspicious for acute cholecystitis.    If clinically indicated, further evaluation with nuclear HIDA scan may be   considered.  0.9 cm cystic lesion in the body of the pancreas appears enlarged   compared to the previous CT of August 29, 2017. If clinically indicated,   further evaluation with MRI/MRCP may be considered.    < end of copied text >      < from: Xray Chest 1 View- PORTABLE-Urgent (08.07.19 @ 18:35) >    IMPRESSION:  Clear  lungs.  No acute airspace disease suggested.

## 2019-08-08 NOTE — H&P ADULT - NSHPPHYSICALEXAM_GEN_ALL_CORE
PHYSICAL EXAM:  GENERAL: NAD, well-developed  HEAD:  Atraumatic, Normocephalic  EYES: Conjunctiva and sclera clear  ENMT: No tonsillar erythema, exudates, or enlargement; Moist mucous membranes, No lesions  NECK: Supple, No JVD, Normal thyroid  NERVOUS SYSTEM:  Alert & Oriented X3  CHEST/LUNG: Clear to auscultation bilaterally; No rales, rhonchi, wheezing  HEART: Regular rate and rhythm. S1S2  ABDOMEN: Nondistended, +BS, soft, nontender, no guarding, no rigidity   EXTREMITIES:  2+ Peripheral Pulses, No clubbing, cyanosis, or edema

## 2019-08-08 NOTE — H&P ADULT - ASSESSMENT
87 y/o female PMHx DM, HTN, HLD, OA, CHF, gastric ulcer presents to the ER c/o periumbilical/epigastric pain since 4pm which has resolved, with elevated LFTs and US suspicious for acute cholecystitis 0.9 cm cystic lesion in the body of the pancreas.

## 2019-08-08 NOTE — CHART NOTE - NSCHARTNOTEFT_GEN_A_CORE
Pt seen and examined at bedside for follow up. Patient resting comfortably, denies abdominal pain, nausea/vomiting. Offers no complaints. Denies chest pain, sob, dizziness.    Vital Signs Last 24 Hrs  T(C): 37.6 (08 Aug 2019 07:00), Max: 38.5 (07 Aug 2019 17:32)  T(F): 99.7 (08 Aug 2019 07:00), Max: 101.3 (07 Aug 2019 17:32)  HR: 81 (08 Aug 2019 07:00) (76 - 107)  BP: 120/53 (08 Aug 2019 07:00) (102/51 - 158/95)  BP(mean): 74 (08 Aug 2019 05:15) (74 - 74)  RR: 18 (08 Aug 2019 07:00) (18 - 25)  SpO2: 95% (08 Aug 2019 07:00) (92% - 99%)    PE:  Gen: NAD, A&Ox3  CV: S1S2, RRR  Chest: CTA b/l.   Abdomen: Nondistended, soft, nontender, no guarding   : Diaper in place  Extremities: No pedal edema, calf soft, nontender b/l     AM labs Reviewed:                          9.0    17.05 )-----------( 227      ( 08 Aug 2019 07:33 )             27.4   08-08    140  |  103  |  17  ----------------------------<  99  3.4<L>   |  27  |  0.94    Ca    8.2<L>      08 Aug 2019 07:33  Phos  3.5     08-08  Mg     1.6     08-08    TPro  6.1  /  Alb  2.8<L>  /  TBili  2.3<H>  /  DBili  1.73<H>  /  AST  1819<H>  /  ALT  1056<H>  /  AlkPhos  167<H>  08-08    A/P: 86F with PMH OA, HTN, HLD, DM, CHF, gastric ulcer admitted with sepsis, elevated LFTs with suspicion for acute cholecystitis due to biliary calculus, r/o gallstone pancreatitis and choledocholithiasis.   Hypokalemia and lactic acidosis improving   Leukocytosis worsened     -F/u STAT MRCP, may need GI consult pending results  -Potassium repleted  -F/u repeat labs   -Cont. Maintenance fluids, IVF bolus ordered, repeat lactate  -Primafit, strict urine output monitoring, I/Os  -Cardio consulted, discussed patient with Nalini who will come see the patient  -F/u Echo  -Discussed with Dr. Rowell Pt seen and examined at bedside for follow up. Patient resting comfortably, denies abdominal pain, nausea/vomiting. Offers no complaints. Denies chest pain, sob, dizziness.    Vital Signs Last 24 Hrs  T(C): 37.6 (08 Aug 2019 07:00), Max: 38.5 (07 Aug 2019 17:32)  T(F): 99.7 (08 Aug 2019 07:00), Max: 101.3 (07 Aug 2019 17:32)  HR: 81 (08 Aug 2019 07:00) (76 - 107)  BP: 120/53 (08 Aug 2019 07:00) (102/51 - 158/95)  BP(mean): 74 (08 Aug 2019 05:15) (74 - 74)  RR: 18 (08 Aug 2019 07:00) (18 - 25)  SpO2: 95% (08 Aug 2019 07:00) (92% - 99%)    PE:  Gen: NAD, A&Ox3  CV: S1S2, RRR  Chest: CTA b/l.   Abdomen: Nondistended, soft, nontender, no guarding   : Diaper in place  Extremities: No pedal edema, calf soft, nontender b/l     AM labs Reviewed:                          9.0    17.05 )-----------( 227      ( 08 Aug 2019 07:33 )             27.4   08-08    140  |  103  |  17  ----------------------------<  99  3.4<L>   |  27  |  0.94    Ca    8.2<L>      08 Aug 2019 07:33  Phos  3.5     08-08  Mg     1.6     08-08    TPro  6.1  /  Alb  2.8<L>  /  TBili  2.3<H>  /  DBili  1.73<H>  /  AST  1819<H>  /  ALT  1056<H>  /  AlkPhos  167<H>  08-08    A/P: 86F with PMH OA, HTN, HLD, DM, CHF, gastric ulcer admitted with sepsis, elevated LFTs with suspicion for acute cholecystitis due to biliary calculus, r/o gallstone pancreatitis and choledocholithiasis.   Hypokalemia and lactic acidosis improving   Leukocytosis worsened     -F/u STAT MRCP, may need GI consult pending results  -Potassium repleted  -F/u repeat labs   -Cont. Maintenance fluids, IVF bolus ordered, repeat lactate  -Primafit, strict urine output monitoring, I/Os  -Cardiology consulted, discussed patient with Nalini who will come see the patient  -F/u Echo  -Discussed with Dr. Rowell

## 2019-08-08 NOTE — CONSULT NOTE ADULT - SUBJECTIVE AND OBJECTIVE BOX
CARDIOLOGY CONSULT NOTE    Patient is a 86y Female with a known history of :  Preventive measure (Z29.9)  Chronic gastric ulcer without hemorrhage and without perforation (K25.7)  Chronic diastolic congestive heart failure (I50.32)  Hyperlipidemia, unspecified hyperlipidemia type (E78.5)  Primary osteoarthritis of both hips (M16.0)  Essential hypertension (I10)  Type 2 diabetes mellitus without complication, without long-term current use of insulin (E11.9)  Hypokalemia (E87.6)  Pancreatic cyst (K86.2)    HPI:  87 y/o female PMHx HTN, HLD, DM, OA, chronic diastolic CHF with preserved LVEF, gastric ulcer presents to the ER c/o periumbilical/epigastric pain.   Admitted with sepsis, elevated LFTs with suspicion for acute cholecystitis due to biliary calculus, r/o gallstone pancreatitis and choledocholithiasis.   Patient resting comfortably, denies abdominal pain, nausea/vomiting. Offers no complaints. Denies chest pain, sob, dizziness.  HCT 37 -> 29  WBC 11 -> 17 -> 16  K 4.0  ECG: sinus tach 111; RBBB; inferior and inferolateral ST depressions      REVIEW OF SYSTEMS:    CONSTITUTIONAL: + fatigue  EYES: No eye pain, visual disturbances, or discharge  ENMT:  No difficulty hearing, tinnitus, vertigo; No sinus or throat pain  NECK: No pain or stiffness  BREASTS: No pain, masses, or nipple discharge  RESPIRATORY: No cough, wheezing, chills or hemoptysis; No shortness of breath  CARDIOVASCULAR: No chest pain, palpitations, dizziness, or leg swelling  GASTROINTESTINAL: mild abd pain  GENITOURINARY: No dysuria, frequency, hematuria, or incontinence  NEUROLOGICAL: No headaches, memory loss, loss of strength, numbness, or tremors  SKIN: No itching, burning, rashes, or lesions   LYMPH NODES: No enlarged glands  ENDOCRINE: No heat or cold intolerance; No hair loss  MUSCULOSKELETAL: No joint pain or swelling; No muscle, back, or extremity pain  PSYCHIATRIC: No depression, anxiety, mood swings, or difficulty sleeping  HEME/LYMPH: No easy bruising, or bleeding gums  ALLERGY AND IMMUNOLOGIC: No hives or eczema    MEDICATIONS  (STANDING):  amLODIPine   Tablet 10 milliGRAM(s) Oral daily  atorvastatin 20 milliGRAM(s) Oral at bedtime  dextrose 5% + sodium chloride 0.45% with potassium chloride 20 mEq/L 1000 milliLiter(s) (120 mL/Hr) IV Continuous <Continuous>  enalapril 20 milliGRAM(s) Oral two times a day  heparin  Injectable 5000 Unit(s) SubCutaneous every 12 hours  insulin lispro (HumaLOG) corrective regimen sliding scale   SubCutaneous three times a day before meals  insulin lispro (HumaLOG) corrective regimen sliding scale   SubCutaneous at bedtime  metoprolol tartrate 100 milliGRAM(s) Oral two times a day  pantoprazole    Tablet 40 milliGRAM(s) Oral before breakfast  piperacillin/tazobactam IVPB.. 3.375 Gram(s) IV Intermittent every 8 hours    MEDICATIONS  (PRN):  acetaminophen   Tablet .. 650 milliGRAM(s) Oral every 6 hours PRN Temp greater or equal to 38C (100.4F), Mild Pain (1 - 3)  dextrose 40% Gel 15 Gram(s) Oral once PRN Blood Glucose LESS THAN 70 milliGRAM(s)/deciliter  glucagon  Injectable 1 milliGRAM(s) IntraMuscular once PRN Glucose LESS THAN 70 milligrams/deciliter  loratadine 10 milliGRAM(s) Oral daily PRN allergies  ondansetron Injectable 4 milliGRAM(s) IV Push every 6 hours PRN Nausea      ALLERGIES: dust (Sneezing)  No Known Drug Allergies  POLLEN (Rhinorrhea; Sneezing)      FAMILY HISTORY:  FH: diabetes mellitus      PHYSICAL EXAMINATION:  -----------------------------  T(C): 37.3 (08-08-19 @ 11:13), Max: 38.5 (08-07-19 @ 17:32)  HR: 75 (08-08-19 @ 11:13) (75 - 107)  BP: 122/66 (08-08-19 @ 11:13) (102/51 - 158/95)  RR: 16 (08-08-19 @ 11:13) (16 - 25)  SpO2: 95% (08-08-19 @ 11:13) (92% - 99%)  Wt(kg): --    08-07 @ 07:01  -  08-08 @ 07:00  --------------------------------------------------------  IN:    dextrose 5% + sodium chloride 0.45% with potassium chloride 20 mEq/L: 120 mL    IV PiggyBack: 100 mL    Lactated Ringers IV Bolus: 500 mL  Total IN: 720 mL    OUT:  Total OUT: 0 mL    Total NET: 720 mL        Height (cm): 162.6 (08-08 @ 07:00)  Weight (kg): 55.6 (08-08 @ 07:00)  BMI (kg/m2): 21 (08-08 @ 07:00)  BSA (m2): 1.59 (08-08 @ 07:00)    Constitutional:  no acute distress.   Eyes: the conjunctiva exhibited no abnormalities and the eyelids demonstrated no xanthelasmas.   HEENT: normal oral mucosa, no oral pallor and no oral cyanosis.   Neck: normal jugular venous A waves present, normal jugular venous V waves present and no jugular venous almaraz A waves.   Pulmonary: no respiratory distress, normal respiratory rhythm and effort, no accessory muscle use and lungs were clear to auscultation bilaterally.   Cardiovascular: heart rate and rhythm were normal; 2/6 SM  Abdomen: mildly tender to palpation  Musculoskeletal: the gait could not be assessed..   Extremities: no clubbing of the fingernails, no localized cyanosis, no petechial hemorrhages and no ischemic changes.   Skin: normal skin color and pigmentation, no rash, no venous stasis, no skin lesions, no skin ulcer and no xanthoma was observed.   Psychiatric: oriented to person    LABS:   --------  08-08    139  |  106  |  13  ----------------------------<  119<H>  3.9   |  26  |  0.76    Ca    8.4<L>      08 Aug 2019 15:01  Phos  2.6     08-08  Mg     1.6     08-08    TPro  6.2  /  Alb  2.9<L>  /  TBili  2.8<H>  /  DBili  2.25<H>  /  AST  1176<H>  /  ALT  924<H>  /  AlkPhos  161<H>  08-08                         9.4    16.75 )-----------( 246      ( 08 Aug 2019 15:01 )             28.9       PT/INR - ( 07 Aug 2019 17:52 )   PT: 12.1 sec;   INR: 1.08 ratio         PTT - ( 07 Aug 2019 17:52 )  PTT:26.6 sec            RADIOLOGY:  -----------------    ECG: sinus tach 111; RBBB; inferior and inferolateral ST depressions    < from: Transthoracic Echocardiogram (08.30.17 @ 09:30) >  1. Normal left ventricular systolic function. No segmental  wall motion abnormalities.  2. Mild diastolic dysfunction (Stage I).  3. Normal right ventricular size and function.  4. No significant valvular disease.    < end of copied text >

## 2019-08-09 LAB
ALBUMIN SERPL ELPH-MCNC: 2.6 G/DL — LOW (ref 3.3–5)
ALP SERPL-CCNC: 157 U/L — HIGH (ref 40–120)
ALT FLD-CCNC: 640 U/L — HIGH (ref 12–78)
ANION GAP SERPL CALC-SCNC: 8 MMOL/L — SIGNIFICANT CHANGE UP (ref 5–17)
AST SERPL-CCNC: 607 U/L — HIGH (ref 15–37)
BILIRUB DIRECT SERPL-MCNC: 2.01 MG/DL — HIGH (ref 0.05–0.2)
BILIRUB INDIRECT FLD-MCNC: 0.7 MG/DL — SIGNIFICANT CHANGE UP (ref 0.2–1)
BILIRUB SERPL-MCNC: 2.7 MG/DL — HIGH (ref 0.2–1.2)
BUN SERPL-MCNC: 10 MG/DL — SIGNIFICANT CHANGE UP (ref 7–23)
CALCIUM SERPL-MCNC: 8.4 MG/DL — LOW (ref 8.5–10.1)
CHLORIDE SERPL-SCNC: 110 MMOL/L — HIGH (ref 96–108)
CO2 SERPL-SCNC: 24 MMOL/L — SIGNIFICANT CHANGE UP (ref 22–31)
CREAT SERPL-MCNC: 0.75 MG/DL — SIGNIFICANT CHANGE UP (ref 0.5–1.3)
GLUCOSE SERPL-MCNC: 143 MG/DL — HIGH (ref 70–99)
HBA1C BLD-MCNC: 5.6 % — SIGNIFICANT CHANGE UP (ref 4–5.6)
HCT VFR BLD CALC: 27.7 % — LOW (ref 34.5–45)
HGB BLD-MCNC: 9 G/DL — LOW (ref 11.5–15.5)
LIDOCAIN IGE QN: 101 U/L — SIGNIFICANT CHANGE UP (ref 73–393)
MAGNESIUM SERPL-MCNC: 1.7 MG/DL — SIGNIFICANT CHANGE UP (ref 1.6–2.6)
MCHC RBC-ENTMCNC: 27.6 PG — SIGNIFICANT CHANGE UP (ref 27–34)
MCHC RBC-ENTMCNC: 32.5 GM/DL — SIGNIFICANT CHANGE UP (ref 32–36)
MCV RBC AUTO: 85 FL — SIGNIFICANT CHANGE UP (ref 80–100)
NRBC # BLD: 0 /100 WBCS — SIGNIFICANT CHANGE UP (ref 0–0)
PHOSPHATE SERPL-MCNC: 2.2 MG/DL — LOW (ref 2.5–4.5)
PLATELET # BLD AUTO: 240 K/UL — SIGNIFICANT CHANGE UP (ref 150–400)
POTASSIUM SERPL-MCNC: 3.9 MMOL/L — SIGNIFICANT CHANGE UP (ref 3.5–5.3)
POTASSIUM SERPL-SCNC: 3.9 MMOL/L — SIGNIFICANT CHANGE UP (ref 3.5–5.3)
PROT SERPL-MCNC: 5.8 GM/DL — LOW (ref 6–8.3)
RBC # BLD: 3.26 M/UL — LOW (ref 3.8–5.2)
RBC # FLD: 15.4 % — HIGH (ref 10.3–14.5)
SODIUM SERPL-SCNC: 142 MMOL/L — SIGNIFICANT CHANGE UP (ref 135–145)
WBC # BLD: 12.44 K/UL — HIGH (ref 3.8–10.5)
WBC # FLD AUTO: 12.44 K/UL — HIGH (ref 3.8–10.5)

## 2019-08-09 PROCEDURE — 99223 1ST HOSP IP/OBS HIGH 75: CPT

## 2019-08-09 RX ORDER — POTASSIUM PHOSPHATE, MONOBASIC POTASSIUM PHOSPHATE, DIBASIC 236; 224 MG/ML; MG/ML
15 INJECTION, SOLUTION INTRAVENOUS ONCE
Refills: 0 | Status: COMPLETED | OUTPATIENT
Start: 2019-08-09 | End: 2019-08-09

## 2019-08-09 RX ADMIN — Medication 650 MILLIGRAM(S): at 05:57

## 2019-08-09 RX ADMIN — DEXTROSE MONOHYDRATE, SODIUM CHLORIDE, AND POTASSIUM CHLORIDE 120 MILLILITER(S): 50; .745; 4.5 INJECTION, SOLUTION INTRAVENOUS at 22:03

## 2019-08-09 RX ADMIN — DEXTROSE MONOHYDRATE, SODIUM CHLORIDE, AND POTASSIUM CHLORIDE 120 MILLILITER(S): 50; .745; 4.5 INJECTION, SOLUTION INTRAVENOUS at 05:14

## 2019-08-09 RX ADMIN — Medication 20 MILLIGRAM(S): at 05:13

## 2019-08-09 RX ADMIN — PIPERACILLIN AND TAZOBACTAM 25 GRAM(S): 4; .5 INJECTION, POWDER, LYOPHILIZED, FOR SOLUTION INTRAVENOUS at 14:10

## 2019-08-09 RX ADMIN — Medication 20 MILLIGRAM(S): at 17:21

## 2019-08-09 RX ADMIN — Medication 650 MILLIGRAM(S): at 11:30

## 2019-08-09 RX ADMIN — PANTOPRAZOLE SODIUM 40 MILLIGRAM(S): 20 TABLET, DELAYED RELEASE ORAL at 08:18

## 2019-08-09 RX ADMIN — Medication 100 MILLIGRAM(S): at 05:13

## 2019-08-09 RX ADMIN — HEPARIN SODIUM 5000 UNIT(S): 5000 INJECTION INTRAVENOUS; SUBCUTANEOUS at 05:13

## 2019-08-09 RX ADMIN — Medication 650 MILLIGRAM(S): at 23:22

## 2019-08-09 RX ADMIN — Medication 650 MILLIGRAM(S): at 11:01

## 2019-08-09 RX ADMIN — POTASSIUM PHOSPHATE, MONOBASIC POTASSIUM PHOSPHATE, DIBASIC 62.5 MILLIMOLE(S): 236; 224 INJECTION, SOLUTION INTRAVENOUS at 10:21

## 2019-08-09 RX ADMIN — Medication 650 MILLIGRAM(S): at 00:03

## 2019-08-09 RX ADMIN — AMLODIPINE BESYLATE 10 MILLIGRAM(S): 2.5 TABLET ORAL at 05:13

## 2019-08-09 RX ADMIN — HEPARIN SODIUM 5000 UNIT(S): 5000 INJECTION INTRAVENOUS; SUBCUTANEOUS at 17:29

## 2019-08-09 RX ADMIN — Medication 650 MILLIGRAM(S): at 05:10

## 2019-08-09 RX ADMIN — Medication 1: at 17:23

## 2019-08-09 RX ADMIN — PIPERACILLIN AND TAZOBACTAM 25 GRAM(S): 4; .5 INJECTION, POWDER, LYOPHILIZED, FOR SOLUTION INTRAVENOUS at 05:12

## 2019-08-09 RX ADMIN — Medication 100 MILLIGRAM(S): at 17:24

## 2019-08-09 RX ADMIN — PIPERACILLIN AND TAZOBACTAM 25 GRAM(S): 4; .5 INJECTION, POWDER, LYOPHILIZED, FOR SOLUTION INTRAVENOUS at 22:04

## 2019-08-09 RX ADMIN — ATORVASTATIN CALCIUM 20 MILLIGRAM(S): 80 TABLET, FILM COATED ORAL at 22:05

## 2019-08-09 NOTE — CONSULT NOTE ADULT - SUBJECTIVE AND OBJECTIVE BOX
HPI:  87 y/o female PMHx DM, HTN, HLD, OA, CHF, gastric ulcer presents to the ER c/o periumbilical/epigastric pain since 4pm. Patient reports that pain was previously severe but now the pain has completely resolved without any analgesics in the ER. Reports 4 episodes of NBNB emesis after drinking ginger ale and water. Reports normal BM/flatus. Last BM was yesterday. Denies abdominal pain after eating. Patient denies fever, chills, nausea, vomiting, constipation, diarrhea, hematuria, melena, hematochezia, chest pain, shortness of breath, dizziness. Patient denies prior incident. (08 Aug 2019 04:38)      PAST MEDICAL & SURGICAL HISTORY:  Osteoarthritis  Chronic hip pain  Chronic shoulder pain  Hyperlipemia  DM (diabetes mellitus)  HTN (hypertension)  No significant past surgical history      REVIEW OF SYSTEMS:    CONSTITUTIONAL: No fever, weight loss, but  fatigue  EYES: No eye pain, visual disturbances, or discharge  ENMT:  No difficulty hearing, tinnitus, vertigo; No sinus or throat pain  NECK: No pain or stiffness  BREASTS: No pain, masses, or nipple discharge  RESPIRATORY: No cough, wheezing, chills or hemoptysis; No shortness of breath  CARDIOVASCULAR: No chest pain, palpitations, dizziness, or leg swelling  GASTROINTESTINAL: nausea right upper quadrant abdominal pain   GENITOURINARY: No dysuria, frequency, hematuria, or incontinence  NEUROLOGICAL: No headaches, memory loss, loss of strength, numbness, or tremors  SKIN: No itching, burning, rashes, or lesions   LYMPH NODES: No enlarged glands  ENDOCRINE: No heat or cold intolerance; No hair loss  MUSCULOSKELETAL: No joint pain or swelling; No muscle, back, or extremity pain  PSYCHIATRIC: No depression, anxiety, mood swings, or difficulty sleeping  HEME/LYMPH: No easy bruising, or bleeding gums  ALLERGY AND IMMUNOLOGIC: No hives or eczema      MEDICATIONS  (STANDING):  amLODIPine   Tablet 10 milliGRAM(s) Oral daily  atorvastatin 20 milliGRAM(s) Oral at bedtime  dextrose 5% + sodium chloride 0.45% with potassium chloride 20 mEq/L 1000 milliLiter(s) (120 mL/Hr) IV Continuous <Continuous>  dextrose 5%. 1000 milliLiter(s) (50 mL/Hr) IV Continuous <Continuous>  dextrose 50% Injectable 12.5 Gram(s) IV Push once  dextrose 50% Injectable 25 Gram(s) IV Push once  dextrose 50% Injectable 25 Gram(s) IV Push once  enalapril 20 milliGRAM(s) Oral two times a day  heparin  Injectable 5000 Unit(s) SubCutaneous every 12 hours  insulin lispro (HumaLOG) corrective regimen sliding scale   SubCutaneous three times a day before meals  insulin lispro (HumaLOG) corrective regimen sliding scale   SubCutaneous at bedtime  metoprolol tartrate 100 milliGRAM(s) Oral two times a day  pantoprazole    Tablet 40 milliGRAM(s) Oral before breakfast  piperacillin/tazobactam IVPB.. 3.375 Gram(s) IV Intermittent every 8 hours    MEDICATIONS  (PRN):  acetaminophen   Tablet .. 650 milliGRAM(s) Oral every 6 hours PRN Temp greater or equal to 38C (100.4F), Mild Pain (1 - 3)  dextrose 40% Gel 15 Gram(s) Oral once PRN Blood Glucose LESS THAN 70 milliGRAM(s)/deciliter  glucagon  Injectable 1 milliGRAM(s) IntraMuscular once PRN Glucose LESS THAN 70 milligrams/deciliter  loratadine 10 milliGRAM(s) Oral daily PRN allergies  ondansetron Injectable 4 milliGRAM(s) IV Push every 6 hours PRN Nausea      Allergies    dust (Sneezing)  No Known Drug Allergies  POLLEN (Rhinorrhea; Sneezing)    Intolerances        SOCIAL HISTORY:    FAMILY HISTORY:  FH: diabetes mellitus      Vital Signs Last 24 Hrs  T(C): 36.7 (09 Aug 2019 17:10), Max: 38.8 (09 Aug 2019 11:02)  T(F): 98 (09 Aug 2019 17:10), Max: 101.9 (09 Aug 2019 11:02)  HR: 78 (09 Aug 2019 11:02) (59 - 78)  BP: 138/67 (09 Aug 2019 17:10) (138/67 - 150/71)  BP(mean): --  RR: 16 (09 Aug 2019 17:10) (16 - 18)  SpO2: 98% (09 Aug 2019 17:10) (95% - 98%)    PHYSICAL EXAM:    GENERAL: NAD, well-groomed, well-developed  HEAD:  Atraumatic, Normocephalic  EYES: EOMI, PERRLA, conjunctiva and sclera clear  ENMT: No tonsillar erythema, exudates, or enlargement; Moist mucous membranes, Good dentition, No lesions  NECK: Supple, No JVD, Normal thyroid  NERVOUS SYSTEM:  Alert & Oriented X3, Good concentration; Motor Strength 5/5 B/L upper and lower extremities; DTRs 2+ intact and symmetric  CHEST/LUNG: Clear to percussion bilaterally; No rales, rhonchi, wheezing, or rubs  HEART: Regular rate and rhythm; No murmurs, rubs, or gallops  ABDOMEN: Soft, right upper quadrant tenderness  EXTREMITIES:  2+ Peripheral Pulses, No clubbing, cyanosis, or edema      LABS:                        9.0    12.44 )-----------( 240      ( 09 Aug 2019 07:55 )             27.7     08-    142  |  110<H>  |  10  ----------------------------<  143<H>  3.9   |  24  |  0.75    Ca    8.4<L>      09 Aug 2019 07:55  Phos  2.2     -  Mg     1.7     -    TPro  5.8<L>  /  Alb  2.6<L>  /  TBili  2.7<H>  /  DBili  2.01<H>  /  AST  607<H>  /  ALT  640<H>  /  AlkPhos  157<H>      PT/INR - ( 07 Aug 2019 17:52 )   PT: 12.1 sec;   INR: 1.08 ratio         PTT - ( 07 Aug 2019 17:52 )  PTT:26.6 sec  Urinalysis Basic - ( 07 Aug 2019 18:36 )    Color: Yellow / Appearance: Clear / S.015 / pH: x  Gluc: x / Ketone: Negative  / Bili: Negative / Urobili: Negative mg/dL   Blood: x / Protein: 15 mg/dL / Nitrite: Negative   Leuk Esterase: Negative / RBC: x / WBC 0-2   Sq Epi: x / Non Sq Epi: Few / Bacteria: Few        RADIOLOGY & ADDITIONAL STUDIES:

## 2019-08-09 NOTE — CHART NOTE - NSCHARTNOTEFT_GEN_A_CORE
9.0    12.44 )-----------( 240      ( 09 Aug 2019 07:55 )             27.7   08-09    142  |  110<H>  |  10  ----------------------------<  143<H>  3.9   |  24  |  0.75    Ca    8.4<L>      09 Aug 2019 07:55  Phos  2.2     08-09  Mg     1.7     08-09    TPro  5.8<L>  /  Alb  2.6<L>  /  TBili  2.7<H>  /  DBili  2.01<H>  /  AST  607<H>  /  ALT  640<H>  /  AlkPhos  157<H>  08-09    Morning labs reviewed and electrolytes replaced as needed, repeat labs in am.   LFTS improving  full liquid diet  Tylenol for fever  continue antibiotics

## 2019-08-09 NOTE — CONSULT NOTE ADULT - ASSESSMENT
87 y/o female with history of GI  ulcer presents with right upper quadrant abdominal pain, Consult for medical co management, currently stable but will need to insure patient is ready to go to OR for cholecystectomy if needed.     Follow electrolytes   pain control   GI consult

## 2019-08-09 NOTE — PROGRESS NOTE ADULT - SUBJECTIVE AND OBJECTIVE BOX
Pt seen and examined at bedside for follow up. Patient resting comfortably, denies abdominal pain, nausea/vomiting. Offers no complaints. Denies chest pain, sob, dizziness.    T(F): 100.1 (08-09-19 @ 05:49), Max: 100.5 (08-08-19 @ 22:30)  HR: 78 (08-09-19 @ 05:49) (59 - 79)  BP: 139/74 (08-09-19 @ 05:49) (122/66 - 148/73)  RR: 18 (08-09-19 @ 05:49) (16 - 18)  SpO2: 95% (08-09-19 @ 05:49) (95% - 96%)  Wt(kg): --  CAPILLARY BLOOD GLUCOSE      POCT Blood Glucose.: 125 mg/dL (09 Aug 2019 05:27)  POCT Blood Glucose.: 113 mg/dL (08 Aug 2019 22:00)  POCT Blood Glucose.: 108 mg/dL (08 Aug 2019 17:17)  POCT Blood Glucose.: 103 mg/dL (08 Aug 2019 12:01)  POCT Blood Glucose.: 119 mg/dL (08 Aug 2019 08:21)      Physical Exam:  Gen: NAD, A&Ox3  CV: S1S2, RRR  Chest: CTA b/l.   Abdomen: Nondistended, soft, nontender, no guarding   : external urinary collecting system in place.  Extremities: No pedal edema, calf soft, nontender b/l   LABS:                        9.4    16.75 )-----------( 246      ( 08 Aug 2019 15:01 )             28.9     08-08    139  |  106  |  13  ----------------------------<  119<H>  3.9   |  26  |  0.76    Ca    8.4<L>      08 Aug 2019 15:01  Phos  2.6     08-08  Mg     1.6     08-08    TPro  6.2  /  Alb  2.9<L>  /  TBili  2.8<H>  /  DBili  2.25<H>  /  AST  1176<H>  /  ALT  924<H>  /  AlkPhos  161<H>  08-08    PT/INR - ( 07 Aug 2019 17:52 )   PT: 12.1 sec;   INR: 1.08 ratio         PTT - ( 07 Aug 2019 17:52 )  PTT:26.6 sec  I&O's Detail    08 Aug 2019 07:01  -  09 Aug 2019 07:00  --------------------------------------------------------  IN:    dextrose 5% + sodium chloride 0.45% with potassium chloride 20 mEq/L: 1440 mL    IV PiggyBack: 200 mL  Total IN: 1640 mL    OUT:    Voided: 1700 mL  Total OUT: 1700 mL    Total NET: -60 mL        Culture Results:   No growth to date. (08-07 @ 23:03)  Culture Results:   No growth to date. (08-07 @ 23:03)  Culture Results:   <10,000 CFU/mL Normal Urogenital Angela (08-07 @ 22:55)      A/P: 86F with PMH OA, HTN, HLD, DM, CHF, gastric ulcer admitted with sepsis, elevated LFTs with suspicion for acute cholecystitis due to biliary calculus, r/o gallstone pancreatitis and choledocholithiasis. Low-grade fever  Hypokalemia and lactic acidosis improving     -Cont medical management  -F/u AM labs   -Cont. Maintenance fluids, IVF bolus ordered, repeat lactate  -Primafit, strict urine output monitoring, I/Os  - GI follow-up  -Discussed with Dr. Rowell.

## 2019-08-10 LAB
ALBUMIN SERPL ELPH-MCNC: 2.5 G/DL — LOW (ref 3.3–5)
ALP SERPL-CCNC: 164 U/L — HIGH (ref 40–120)
ALT FLD-CCNC: 488 U/L — HIGH (ref 12–78)
ANION GAP SERPL CALC-SCNC: 8 MMOL/L — SIGNIFICANT CHANGE UP (ref 5–17)
AST SERPL-CCNC: 297 U/L — HIGH (ref 15–37)
BILIRUB DIRECT SERPL-MCNC: 0.71 MG/DL — HIGH (ref 0.05–0.2)
BILIRUB INDIRECT FLD-MCNC: 0.6 MG/DL — SIGNIFICANT CHANGE UP (ref 0.2–1)
BILIRUB SERPL-MCNC: 1.3 MG/DL — HIGH (ref 0.2–1.2)
BUN SERPL-MCNC: 6 MG/DL — LOW (ref 7–23)
CALCIUM SERPL-MCNC: 8.3 MG/DL — LOW (ref 8.5–10.1)
CHLORIDE SERPL-SCNC: 106 MMOL/L — SIGNIFICANT CHANGE UP (ref 96–108)
CO2 SERPL-SCNC: 23 MMOL/L — SIGNIFICANT CHANGE UP (ref 22–31)
CREAT SERPL-MCNC: 0.6 MG/DL — SIGNIFICANT CHANGE UP (ref 0.5–1.3)
GLUCOSE SERPL-MCNC: 149 MG/DL — HIGH (ref 70–99)
HCT VFR BLD CALC: 31.7 % — LOW (ref 34.5–45)
HGB BLD-MCNC: 10.3 G/DL — LOW (ref 11.5–15.5)
LIDOCAIN IGE QN: 111 U/L — SIGNIFICANT CHANGE UP (ref 73–393)
MAGNESIUM SERPL-MCNC: 1.6 MG/DL — SIGNIFICANT CHANGE UP (ref 1.6–2.6)
MCHC RBC-ENTMCNC: 27.2 PG — SIGNIFICANT CHANGE UP (ref 27–34)
MCHC RBC-ENTMCNC: 32.5 GM/DL — SIGNIFICANT CHANGE UP (ref 32–36)
MCV RBC AUTO: 83.9 FL — SIGNIFICANT CHANGE UP (ref 80–100)
NRBC # BLD: 0 /100 WBCS — SIGNIFICANT CHANGE UP (ref 0–0)
PHOSPHATE SERPL-MCNC: 2.5 MG/DL — SIGNIFICANT CHANGE UP (ref 2.5–4.5)
PLATELET # BLD AUTO: 238 K/UL — SIGNIFICANT CHANGE UP (ref 150–400)
POTASSIUM SERPL-MCNC: 3.7 MMOL/L — SIGNIFICANT CHANGE UP (ref 3.5–5.3)
POTASSIUM SERPL-SCNC: 3.7 MMOL/L — SIGNIFICANT CHANGE UP (ref 3.5–5.3)
PROT SERPL-MCNC: 6.6 GM/DL — SIGNIFICANT CHANGE UP (ref 6–8.3)
RBC # BLD: 3.78 M/UL — LOW (ref 3.8–5.2)
RBC # FLD: 15.3 % — HIGH (ref 10.3–14.5)
SODIUM SERPL-SCNC: 137 MMOL/L — SIGNIFICANT CHANGE UP (ref 135–145)
WBC # BLD: 11.1 K/UL — HIGH (ref 3.8–10.5)
WBC # FLD AUTO: 11.1 K/UL — HIGH (ref 3.8–10.5)

## 2019-08-10 PROCEDURE — 99233 SBSQ HOSP IP/OBS HIGH 50: CPT

## 2019-08-10 PROCEDURE — 74177 CT ABD & PELVIS W/CONTRAST: CPT | Mod: 26

## 2019-08-10 RX ORDER — IOHEXOL 300 MG/ML
30 INJECTION, SOLUTION INTRAVENOUS ONCE
Refills: 0 | Status: COMPLETED | OUTPATIENT
Start: 2019-08-10 | End: 2019-08-10

## 2019-08-10 RX ADMIN — Medication 1: at 11:28

## 2019-08-10 RX ADMIN — HEPARIN SODIUM 5000 UNIT(S): 5000 INJECTION INTRAVENOUS; SUBCUTANEOUS at 17:34

## 2019-08-10 RX ADMIN — PIPERACILLIN AND TAZOBACTAM 25 GRAM(S): 4; .5 INJECTION, POWDER, LYOPHILIZED, FOR SOLUTION INTRAVENOUS at 13:40

## 2019-08-10 RX ADMIN — ATORVASTATIN CALCIUM 20 MILLIGRAM(S): 80 TABLET, FILM COATED ORAL at 22:39

## 2019-08-10 RX ADMIN — HEPARIN SODIUM 5000 UNIT(S): 5000 INJECTION INTRAVENOUS; SUBCUTANEOUS at 05:13

## 2019-08-10 RX ADMIN — PIPERACILLIN AND TAZOBACTAM 25 GRAM(S): 4; .5 INJECTION, POWDER, LYOPHILIZED, FOR SOLUTION INTRAVENOUS at 05:11

## 2019-08-10 RX ADMIN — AMLODIPINE BESYLATE 10 MILLIGRAM(S): 2.5 TABLET ORAL at 05:12

## 2019-08-10 RX ADMIN — Medication 650 MILLIGRAM(S): at 00:30

## 2019-08-10 RX ADMIN — PANTOPRAZOLE SODIUM 40 MILLIGRAM(S): 20 TABLET, DELAYED RELEASE ORAL at 08:16

## 2019-08-10 RX ADMIN — PIPERACILLIN AND TAZOBACTAM 25 GRAM(S): 4; .5 INJECTION, POWDER, LYOPHILIZED, FOR SOLUTION INTRAVENOUS at 22:38

## 2019-08-10 RX ADMIN — DEXTROSE MONOHYDRATE, SODIUM CHLORIDE, AND POTASSIUM CHLORIDE 120 MILLILITER(S): 50; .745; 4.5 INJECTION, SOLUTION INTRAVENOUS at 05:11

## 2019-08-10 RX ADMIN — Medication 20 MILLIGRAM(S): at 17:38

## 2019-08-10 RX ADMIN — Medication 100 MILLIGRAM(S): at 05:12

## 2019-08-10 RX ADMIN — IOHEXOL 30 MILLILITER(S): 300 INJECTION, SOLUTION INTRAVENOUS at 19:42

## 2019-08-10 RX ADMIN — Medication 100 MILLIGRAM(S): at 17:38

## 2019-08-10 RX ADMIN — Medication 20 MILLIGRAM(S): at 05:12

## 2019-08-10 NOTE — CHART NOTE - NSCHARTNOTEFT_GEN_A_CORE
10.3   11.10 )-----------( 238      ( 10 Aug 2019 07:18 )             31.7   08-10    137  |  106  |  6<L>  ----------------------------<  149<H>  3.7   |  23  |  0.60    Ca    8.3<L>      10 Aug 2019 07:18  Phos  2.5     08-10  Mg     1.6     08-10    TPro  6.6  /  Alb  2.5<L>  /  TBili  1.3<H>  /  DBili  .71<H>  /  AST  297<H>  /  ALT  488<H>  /  AlkPhos  164<H>  08-10    Morning labs reviewed and electrolytes replaced as needed, repeat labs in am.   f/u GI consult, LFTs improving 10.3   11.10 )-----------( 238      ( 10 Aug 2019 07:18 )             31.7   08-10    137  |  106  |  6<L>  ----------------------------<  149<H>  3.7   |  23  |  0.60    Ca    8.3<L>      10 Aug 2019 07:18  Phos  2.5     08-10  Mg     1.6     08-10    TPro  6.6  /  Alb  2.5<L>  /  TBili  1.3<H>  /  DBili  .71<H>  /  AST  297<H>  /  ALT  488<H>  /  AlkPhos  164<H>  08-10    Morning labs reviewed and electrolytes replaced as needed, repeat labs in am.   f/u GI consult, LFTs improving. advance to low fat puree diet

## 2019-08-10 NOTE — PROGRESS NOTE ADULT - ASSESSMENT
87 y/o female with history of GI  ulcer presents with right upper quadrant abdominal pain, Consult for medical co management doing well today with slight decrease in LFTS  continue to follow GI consult may benefit from hida scan

## 2019-08-10 NOTE — CONSULT NOTE ADULT - SUBJECTIVE AND OBJECTIVE BOX
full consult dictated    Plan: Pt with gallstones; +cholecystitis.  Also concerned with possible IPMN pancreas vs pseudocyst. Will request HIDA scan and CA 19-9

## 2019-08-10 NOTE — PROGRESS NOTE ADULT - SUBJECTIVE AND OBJECTIVE BOX
Patient is a 86y old  Female who presents with a chief complaint of Abdominal pain (11 Aug 2019 05:43)      INTERVAL HPI/OVERNIGHT EVENTS: no acute events improving abdominal pain     MEDICATIONS  (STANDING):  amLODIPine   Tablet 10 milliGRAM(s) Oral daily  atorvastatin 20 milliGRAM(s) Oral at bedtime  dextrose 5%. 1000 milliLiter(s) (50 mL/Hr) IV Continuous <Continuous>  dextrose 50% Injectable 12.5 Gram(s) IV Push once  dextrose 50% Injectable 25 Gram(s) IV Push once  dextrose 50% Injectable 25 Gram(s) IV Push once  enalapril 20 milliGRAM(s) Oral two times a day  heparin  Injectable 5000 Unit(s) SubCutaneous every 12 hours  insulin lispro (HumaLOG) corrective regimen sliding scale   SubCutaneous three times a day before meals  insulin lispro (HumaLOG) corrective regimen sliding scale   SubCutaneous at bedtime  metoprolol tartrate 100 milliGRAM(s) Oral two times a day  oxyCODONE    5 mG/acetaminophen 325 mG 1 Tablet(s) Oral once  pantoprazole    Tablet 40 milliGRAM(s) Oral before breakfast  piperacillin/tazobactam IVPB.. 3.375 Gram(s) IV Intermittent every 8 hours    MEDICATIONS  (PRN):  acetaminophen   Tablet .. 650 milliGRAM(s) Oral every 6 hours PRN Temp greater or equal to 38C (100.4F), Mild Pain (1 - 3)  dextrose 40% Gel 15 Gram(s) Oral once PRN Blood Glucose LESS THAN 70 milliGRAM(s)/deciliter  glucagon  Injectable 1 milliGRAM(s) IntraMuscular once PRN Glucose LESS THAN 70 milligrams/deciliter  loratadine 10 milliGRAM(s) Oral daily PRN allergies  ondansetron Injectable 4 milliGRAM(s) IV Push every 6 hours PRN Nausea      Allergies    dust (Sneezing)  No Known Drug Allergies  POLLEN (Rhinorrhea; Sneezing)    Intolerances        REVIEW OF SYSTEMS:  CONSTITUTIONAL: No fever, weight loss, or fatigue  EYES: No eye pain, visual disturbances, or discharge  ENMT:  No difficulty hearing, tinnitus, vertigo; No sinus or throat pain  NECK: No pain or stiffness  BREASTS: No pain, masses, or nipple discharge  RESPIRATORY: No cough, wheezing, chills or hemoptysis; No shortness of breath  CARDIOVASCULAR: No chest pain, palpitations, dizziness, or leg swelling  GASTROINTESTINAL: No abdominal or epigastric pain. No nausea, vomiting, or hematemesis; No diarrhea or constipation. No melena or hematochezia.  GENITOURINARY: No dysuria, frequency, hematuria, or incontinence  NEUROLOGICAL: No headaches, memory loss, loss of strength, numbness, or tremors  SKIN: No itching, burning, rashes, or lesions   LYMPH NODES: No enlarged glands  ENDOCRINE: No heat or cold intolerance; No hair loss  MUSCULOSKELETAL: No joint pain or swelling; No muscle, back, or extremity pain  PSYCHIATRIC: No depression, anxiety, mood swings, or difficulty sleeping  HEME/LYMPH: No easy bruising, or bleeding gums  ALLERGY AND IMMUNOLOGIC: No hives or eczema    Vital Signs Last 24 Hrs  T(C): 37.2 (11 Aug 2019 11:27), Max: 38.1 (10 Aug 2019 16:40)  T(F): 99 (11 Aug 2019 11:27), Max: 100.5 (10 Aug 2019 16:40)  HR: 78 (11 Aug 2019 11:27) (70 - 92)  BP: 134/70 (11 Aug 2019 11:27) (134/70 - 162/85)  BP(mean): --  RR: 16 (11 Aug 2019 11:27) (16 - 18)  SpO2: 97% (11 Aug 2019 11:27) (97% - 100%)    PHYSICAL EXAM:  GENERAL: NAD, well-groomed, well-developed  HEAD:  Atraumatic, Normocephalic  EYES: EOMI, PERRLA, conjunctiva and sclera clear  ENMT: No tonsillar erythema, exudates, or enlargement; Moist mucous membranes, Good dentition, No lesions  NECK: Supple, No JVD, Normal thyroid  NERVOUS SYSTEM:  Alert & Oriented X3, Good concentration; Motor Strength 5/5 B/L upper and lower extremities; DTRs 2+ intact and symmetric  CHEST/LUNG: Clear to percussion bilaterally; No rales, rhonchi, wheezing, or rubs  HEART: Regular rate and rhythm; No murmurs, rubs, or gallops  ABDOMEN: epigastric tenderness EXTREMITIES:  2+ Peripheral Pulses, No clubbing, cyanosis, or edema  LYMPH: No lymphadenopathy noted  SKIN: No rashes or lesions    LABS:                        11.0   9.24  )-----------( 282      ( 11 Aug 2019 07:44 )             32.4     08-11    133<L>  |  97  |  8   ----------------------------<  120<H>  3.8   |  23  |  0.84    Ca    9.2      11 Aug 2019 07:44  Phos  3.3     08-11  Mg     1.7     08-11    TPro  7.4  /  Alb  2.7<L>  /  TBili  1.5<H>  /  DBili  .61<H>  /  AST  140<H>  /  ALT  368<H>  /  AlkPhos  195<H>  08-11        CAPILLARY BLOOD GLUCOSE      POCT Blood Glucose.: 152 mg/dL (11 Aug 2019 11:09)  POCT Blood Glucose.: 125 mg/dL (11 Aug 2019 07:54)  POCT Blood Glucose.: 119 mg/dL (10 Aug 2019 22:33)  POCT Blood Glucose.: 118 mg/dL (10 Aug 2019 17:04)      RADIOLOGY & ADDITIONAL TESTS:    Imaging Personally Reviewed:  [ ] YES  [ ] NO    Consultant(s) Notes Reviewed:  [ ] YES  [ ] NO    Care Discussed with Consultants/Other Providers [ ] YES  [ ] NO

## 2019-08-10 NOTE — PROGRESS NOTE ADULT - SUBJECTIVE AND OBJECTIVE BOX
pt seen and examined at bedside, no acute issues, tolerating full liquid diet. Denies abdominal pain. Febrile yesterday, Tmax 101.9, currently afebrile.    T(F): 97.4 (08-10-19 @ 05:09), Max: 101.9 (08-09-19 @ 11:02)  HR: 65 (08-10-19 @ 05:09) (65 - 78)  BP: 154/67 (08-10-19 @ 05:09) (138/67 - 154/67)  RR: 16 (08-10-19 @ 05:09) (16 - 18)  SpO2: 99% (08-10-19 @ 05:09) (95% - 99%)  Wt(kg): --  CAPILLARY BLOOD GLUCOSE      POCT Blood Glucose.: 128 mg/dL (09 Aug 2019 21:57)  POCT Blood Glucose.: 179 mg/dL (09 Aug 2019 17:22)  POCT Blood Glucose.: 141 mg/dL (09 Aug 2019 11:25)    Physical Exam:  Gen: NAD, A&Ox3  CV: S1S2, RRR  Chest: CTA b/l.   Abdomen: Nondistended, soft, nontender, no guarding   : external urinary collecting system in place.  Extremities: No pedal edema, calf soft, nontender b/l     LABS: Pending      I&O's Detail    08 Aug 2019 07:01  -  09 Aug 2019 07:00  --------------------------------------------------------  IN:    dextrose 5% + sodium chloride 0.45% with potassium chloride 20 mEq/L: 1440 mL    IV PiggyBack: 200 mL  Total IN: 1640 mL    OUT:    Voided: 1700 mL  Total OUT: 1700 mL    Total NET: -60 mL      09 Aug 2019 07:01  -  10 Aug 2019 06:54  --------------------------------------------------------  IN:    dextrose 5% + sodium chloride 0.45% with potassium chloride 20 mEq/L: 1320 mL    IV PiggyBack: 900 mL  Total IN: 2220 mL    OUT:    Voided: 4100 mL  Total OUT: 4100 mL    Total NET: -1880 mL        Culture Results:   No growth to date. (08-07 @ 23:03)  Culture Results:   No growth to date. (08-07 @ 23:03)  Culture Results:   <10,000 CFU/mL Normal Urogenital Angela (08-07 @ 22:55)      A/P: 86F with PMH OA, HTN, HLD, DM, CHF, gastric ulcer admitted with sepsis, elevated LFTs with suspicion for acute cholecystitis due to biliary calculus, r/o gallstone pancreatitis and choledocholithiasis. Persistent fevers      -Cont medical management  -F/u AM labs   -cont full liquid diet  -Antipyretics   -possible ct scan today  -Primafit, urine output monitoring, I/Os  - GI follow-up  -will discuss with surgical attending pt seen and examined at bedside, no acute issues, tolerating full liquid diet. Denies abdominal pain. Febrile yesterday, Tmax 101.9, currently afebrile.    T(F): 97.4 (08-10-19 @ 05:09), Max: 101.9 (08-09-19 @ 11:02)  HR: 65 (08-10-19 @ 05:09) (65 - 78)  BP: 154/67 (08-10-19 @ 05:09) (138/67 - 154/67)  RR: 16 (08-10-19 @ 05:09) (16 - 18)  SpO2: 99% (08-10-19 @ 05:09) (95% - 99%)  Wt(kg): --  CAPILLARY BLOOD GLUCOSE      POCT Blood Glucose.: 128 mg/dL (09 Aug 2019 21:57)  POCT Blood Glucose.: 179 mg/dL (09 Aug 2019 17:22)  POCT Blood Glucose.: 141 mg/dL (09 Aug 2019 11:25)    Physical Exam:  Gen: NAD, A&Ox3  CV: S1S2, RRR  Chest: CTA b/l.   Abdomen: Nondistended, soft, nontender, no guarding   : external urinary collecting system in place.  Extremities: No pedal edema, calf soft, nontender b/l     LABS: Pending      I&O's Detail    08 Aug 2019 07:01  -  09 Aug 2019 07:00  --------------------------------------------------------  IN:    dextrose 5% + sodium chloride 0.45% with potassium chloride 20 mEq/L: 1440 mL    IV PiggyBack: 200 mL  Total IN: 1640 mL    OUT:    Voided: 1700 mL  Total OUT: 1700 mL    Total NET: -60 mL      09 Aug 2019 07:01  -  10 Aug 2019 06:54  --------------------------------------------------------  IN:    dextrose 5% + sodium chloride 0.45% with potassium chloride 20 mEq/L: 1320 mL    IV PiggyBack: 900 mL  Total IN: 2220 mL    OUT:    Voided: 4100 mL  Total OUT: 4100 mL    Total NET: -1880 mL        Culture Results:   No growth to date. (08-07 @ 23:03)  Culture Results:   No growth to date. (08-07 @ 23:03)  Culture Results:   <10,000 CFU/mL Normal Urogenital Angela (08-07 @ 22:55)      A/P: 86F with PMH OA, HTN, HLD, DM, CHF, gastric ulcer admitted with sepsis, elevated LFTs with suspicion for gallstone pancreatitis vs choledocholithiasis vs acute cholecystitis. Persistent fevers      -Cont medical management  -F/u AM labs   -cont full liquid diet  -Antipyretics   -possible ct scan today  -Primafit, urine output monitoring, I/Os  - GI follow-up  -will discuss with surgical attending

## 2019-08-11 LAB
ALBUMIN SERPL ELPH-MCNC: 2.7 G/DL — LOW (ref 3.3–5)
ALP SERPL-CCNC: 195 U/L — HIGH (ref 40–120)
ALT FLD-CCNC: 368 U/L — HIGH (ref 12–78)
ANION GAP SERPL CALC-SCNC: 13 MMOL/L — SIGNIFICANT CHANGE UP (ref 5–17)
AST SERPL-CCNC: 140 U/L — HIGH (ref 15–37)
BILIRUB DIRECT SERPL-MCNC: 0.61 MG/DL — HIGH (ref 0.05–0.2)
BILIRUB INDIRECT FLD-MCNC: 0.9 MG/DL — SIGNIFICANT CHANGE UP (ref 0.2–1)
BILIRUB SERPL-MCNC: 1.5 MG/DL — HIGH (ref 0.2–1.2)
BUN SERPL-MCNC: 8 MG/DL — SIGNIFICANT CHANGE UP (ref 7–23)
CALCIUM SERPL-MCNC: 9.2 MG/DL — SIGNIFICANT CHANGE UP (ref 8.5–10.1)
CANCER AG19-9 SERPL-ACNC: 162 U/ML — HIGH
CHLORIDE SERPL-SCNC: 97 MMOL/L — SIGNIFICANT CHANGE UP (ref 96–108)
CO2 SERPL-SCNC: 23 MMOL/L — SIGNIFICANT CHANGE UP (ref 22–31)
CREAT SERPL-MCNC: 0.84 MG/DL — SIGNIFICANT CHANGE UP (ref 0.5–1.3)
GLUCOSE SERPL-MCNC: 120 MG/DL — HIGH (ref 70–99)
HCT VFR BLD CALC: 32.4 % — LOW (ref 34.5–45)
HGB BLD-MCNC: 11 G/DL — LOW (ref 11.5–15.5)
LIDOCAIN IGE QN: 103 U/L — SIGNIFICANT CHANGE UP (ref 73–393)
MAGNESIUM SERPL-MCNC: 1.7 MG/DL — SIGNIFICANT CHANGE UP (ref 1.6–2.6)
MCHC RBC-ENTMCNC: 27.8 PG — SIGNIFICANT CHANGE UP (ref 27–34)
MCHC RBC-ENTMCNC: 34 GM/DL — SIGNIFICANT CHANGE UP (ref 32–36)
MCV RBC AUTO: 81.8 FL — SIGNIFICANT CHANGE UP (ref 80–100)
NRBC # BLD: 0 /100 WBCS — SIGNIFICANT CHANGE UP (ref 0–0)
PHOSPHATE SERPL-MCNC: 3.3 MG/DL — SIGNIFICANT CHANGE UP (ref 2.5–4.5)
PLATELET # BLD AUTO: 282 K/UL — SIGNIFICANT CHANGE UP (ref 150–400)
POTASSIUM SERPL-MCNC: 3.8 MMOL/L — SIGNIFICANT CHANGE UP (ref 3.5–5.3)
POTASSIUM SERPL-SCNC: 3.8 MMOL/L — SIGNIFICANT CHANGE UP (ref 3.5–5.3)
PROT SERPL-MCNC: 7.4 GM/DL — SIGNIFICANT CHANGE UP (ref 6–8.3)
RBC # BLD: 3.96 M/UL — SIGNIFICANT CHANGE UP (ref 3.8–5.2)
RBC # FLD: 14.6 % — HIGH (ref 10.3–14.5)
SODIUM SERPL-SCNC: 133 MMOL/L — LOW (ref 135–145)
WBC # BLD: 9.24 K/UL — SIGNIFICANT CHANGE UP (ref 3.8–10.5)
WBC # FLD AUTO: 9.24 K/UL — SIGNIFICANT CHANGE UP (ref 3.8–10.5)

## 2019-08-11 PROCEDURE — 99232 SBSQ HOSP IP/OBS MODERATE 35: CPT

## 2019-08-11 PROCEDURE — 99233 SBSQ HOSP IP/OBS HIGH 50: CPT

## 2019-08-11 RX ORDER — OXYCODONE AND ACETAMINOPHEN 5; 325 MG/1; MG/1
1 TABLET ORAL ONCE
Refills: 0 | Status: DISCONTINUED | OUTPATIENT
Start: 2019-08-11 | End: 2019-08-11

## 2019-08-11 RX ADMIN — OXYCODONE AND ACETAMINOPHEN 1 TABLET(S): 5; 325 TABLET ORAL at 17:52

## 2019-08-11 RX ADMIN — HEPARIN SODIUM 5000 UNIT(S): 5000 INJECTION INTRAVENOUS; SUBCUTANEOUS at 05:58

## 2019-08-11 RX ADMIN — Medication 1: at 11:29

## 2019-08-11 RX ADMIN — Medication 100 MILLIGRAM(S): at 17:47

## 2019-08-11 RX ADMIN — PIPERACILLIN AND TAZOBACTAM 25 GRAM(S): 4; .5 INJECTION, POWDER, LYOPHILIZED, FOR SOLUTION INTRAVENOUS at 05:59

## 2019-08-11 RX ADMIN — PANTOPRAZOLE SODIUM 40 MILLIGRAM(S): 20 TABLET, DELAYED RELEASE ORAL at 08:15

## 2019-08-11 RX ADMIN — Medication 650 MILLIGRAM(S): at 11:31

## 2019-08-11 RX ADMIN — Medication 650 MILLIGRAM(S): at 12:20

## 2019-08-11 RX ADMIN — Medication 100 MILLIGRAM(S): at 05:58

## 2019-08-11 RX ADMIN — Medication 20 MILLIGRAM(S): at 17:47

## 2019-08-11 RX ADMIN — PIPERACILLIN AND TAZOBACTAM 25 GRAM(S): 4; .5 INJECTION, POWDER, LYOPHILIZED, FOR SOLUTION INTRAVENOUS at 14:33

## 2019-08-11 RX ADMIN — Medication 20 MILLIGRAM(S): at 05:57

## 2019-08-11 RX ADMIN — HEPARIN SODIUM 5000 UNIT(S): 5000 INJECTION INTRAVENOUS; SUBCUTANEOUS at 17:47

## 2019-08-11 RX ADMIN — Medication 2: at 17:44

## 2019-08-11 RX ADMIN — AMLODIPINE BESYLATE 10 MILLIGRAM(S): 2.5 TABLET ORAL at 05:58

## 2019-08-11 RX ADMIN — PIPERACILLIN AND TAZOBACTAM 25 GRAM(S): 4; .5 INJECTION, POWDER, LYOPHILIZED, FOR SOLUTION INTRAVENOUS at 21:55

## 2019-08-11 RX ADMIN — OXYCODONE AND ACETAMINOPHEN 1 TABLET(S): 5; 325 TABLET ORAL at 18:45

## 2019-08-11 RX ADMIN — ATORVASTATIN CALCIUM 20 MILLIGRAM(S): 80 TABLET, FILM COATED ORAL at 21:57

## 2019-08-11 NOTE — PROGRESS NOTE ADULT - SUBJECTIVE AND OBJECTIVE BOX
Pt feeling better  on IV zosyn      MEDICATIONS  (STANDING):  amLODIPine   Tablet 10 milliGRAM(s) Oral daily  atorvastatin 20 milliGRAM(s) Oral at bedtime  dextrose 5%. 1000 milliLiter(s) (50 mL/Hr) IV Continuous <Continuous>  dextrose 50% Injectable 12.5 Gram(s) IV Push once  dextrose 50% Injectable 25 Gram(s) IV Push once  dextrose 50% Injectable 25 Gram(s) IV Push once  enalapril 20 milliGRAM(s) Oral two times a day  heparin  Injectable 5000 Unit(s) SubCutaneous every 12 hours  insulin lispro (HumaLOG) corrective regimen sliding scale   SubCutaneous three times a day before meals  insulin lispro (HumaLOG) corrective regimen sliding scale   SubCutaneous at bedtime  metoprolol tartrate 100 milliGRAM(s) Oral two times a day  pantoprazole    Tablet 40 milliGRAM(s) Oral before breakfast  piperacillin/tazobactam IVPB.. 3.375 Gram(s) IV Intermittent every 8 hours    MEDICATIONS  (PRN):  acetaminophen   Tablet .. 650 milliGRAM(s) Oral every 6 hours PRN Temp greater or equal to 38C (100.4F), Mild Pain (1 - 3)  dextrose 40% Gel 15 Gram(s) Oral once PRN Blood Glucose LESS THAN 70 milliGRAM(s)/deciliter  glucagon  Injectable 1 milliGRAM(s) IntraMuscular once PRN Glucose LESS THAN 70 milligrams/deciliter  loratadine 10 milliGRAM(s) Oral daily PRN allergies  ondansetron Injectable 4 milliGRAM(s) IV Push every 6 hours PRN Nausea      Allergies    dust (Sneezing)  No Known Drug Allergies  POLLEN (Rhinorrhea; Sneezing)    Intolerances        Vital Signs Last 24 Hrs  T(C): 37.3 (11 Aug 2019 17:19), Max: 37.3 (11 Aug 2019 00:07)  T(F): 99.2 (11 Aug 2019 17:19), Max: 99.2 (11 Aug 2019 00:07)  HR: 81 (11 Aug 2019 17:19) (70 - 84)  BP: 137/77 (11 Aug 2019 17:19) (134/70 - 162/85)  BP(mean): --  RR: 17 (11 Aug 2019 17:19) (16 - 18)  SpO2: 99% (11 Aug 2019 17:19) (97% - 100%)    PHYSICAL EXAM:  General: NAD.  CVS: S1, S2  Chest: air entry bilaterally present  Abd: BS present, soft, non-tender      LABS:                        11.0   9.24  )-----------( 282      ( 11 Aug 2019 07:44 )             32.4     08-11    133<L>  |  97  |  8   ----------------------------<  120<H>  3.8   |  23  |  0.84    Ca    9.2      11 Aug 2019 07:44  Phos  3.3     08-11  Mg     1.7     08-11    TPro  7.4  /  Alb  2.7<L>  /  TBili  1.5<H>  /  DBili  .61<H>  /  AST  140<H>  /  ALT  368<H>  /  AlkPhos  195<H>  08-11      continue same meds  diet as tolerated Pt feeling better  on IV zosyn      MEDICATIONS  (STANDING):  amLODIPine   Tablet 10 milliGRAM(s) Oral daily  atorvastatin 20 milliGRAM(s) Oral at bedtime  dextrose 5%. 1000 milliLiter(s) (50 mL/Hr) IV Continuous <Continuous>  dextrose 50% Injectable 12.5 Gram(s) IV Push once  dextrose 50% Injectable 25 Gram(s) IV Push once  dextrose 50% Injectable 25 Gram(s) IV Push once  enalapril 20 milliGRAM(s) Oral two times a day  heparin  Injectable 5000 Unit(s) SubCutaneous every 12 hours  insulin lispro (HumaLOG) corrective regimen sliding scale   SubCutaneous three times a day before meals  insulin lispro (HumaLOG) corrective regimen sliding scale   SubCutaneous at bedtime  metoprolol tartrate 100 milliGRAM(s) Oral two times a day  pantoprazole    Tablet 40 milliGRAM(s) Oral before breakfast  piperacillin/tazobactam IVPB.. 3.375 Gram(s) IV Intermittent every 8 hours    MEDICATIONS  (PRN):  acetaminophen   Tablet .. 650 milliGRAM(s) Oral every 6 hours PRN Temp greater or equal to 38C (100.4F), Mild Pain (1 - 3)  dextrose 40% Gel 15 Gram(s) Oral once PRN Blood Glucose LESS THAN 70 milliGRAM(s)/deciliter  glucagon  Injectable 1 milliGRAM(s) IntraMuscular once PRN Glucose LESS THAN 70 milligrams/deciliter  loratadine 10 milliGRAM(s) Oral daily PRN allergies  ondansetron Injectable 4 milliGRAM(s) IV Push every 6 hours PRN Nausea      Allergies    dust (Sneezing)  No Known Drug Allergies  POLLEN (Rhinorrhea; Sneezing)    Intolerances        Vital Signs Last 24 Hrs  T(C): 37.3 (11 Aug 2019 17:19), Max: 37.3 (11 Aug 2019 00:07)  T(F): 99.2 (11 Aug 2019 17:19), Max: 99.2 (11 Aug 2019 00:07)  HR: 81 (11 Aug 2019 17:19) (70 - 84)  BP: 137/77 (11 Aug 2019 17:19) (134/70 - 162/85)  BP(mean): --  RR: 17 (11 Aug 2019 17:19) (16 - 18)  SpO2: 99% (11 Aug 2019 17:19) (97% - 100%)    PHYSICAL EXAM:  General: NAD.  CVS: S1, S2  Chest: air entry bilaterally present  Abd: BS present, soft, non-tender      LABS:                        11.0   9.24  )-----------( 282      ( 11 Aug 2019 07:44 )             32.4     08-11    133<L>  |  97  |  8   ----------------------------<  120<H>  3.8   |  23  |  0.84    Ca    9.2      11 Aug 2019 07:44  Phos  3.3     08-11  Mg     1.7     08-11    TPro  7.4  /  Alb  2.7<L>  /  TBili  1.5<H>  /  DBili  .61<H>  /  AST  140<H>  /  ALT  368<H>  /  AlkPhos  195<H>  08-11      continue same meds  diet as tolerated - for possible IR perc joaquin tomorrow

## 2019-08-11 NOTE — PROGRESS NOTE ADULT - ASSESSMENT
85 y/o female with history of GI  ulcer presents with right upper quadrant abdominal pain, Consult for medical co management doing well today with slight decrease in LFTS  continue to follow GI consult may benefit from hida scan

## 2019-08-11 NOTE — PROGRESS NOTE ADULT - ASSESSMENT
85 y/o female PMHx HTN, HLD, DM, OA, chronic diastolic CHF with preserved LVEF, gastric ulcer presents to the ER c/o periumbilical/epigastric pain.   Admitted with sepsis, elevated LFTs with suspicion for acute cholecystitis due to biliary calculus, r/o gallstone pancreatitis and choledocholithiasis.   Patient resting comfortably, denies abdominal pain, nausea/vomiting. Denies chest pain, sob, dizziness.    No active cardiac process now.   Will sign off. please call back prn.    MEDICATIONS  (STANDING):  amLODIPine   Tablet 10 milliGRAM(s) Oral daily  atorvastatin 20 milliGRAM(s) Oral at bedtime  enalapril 20 milliGRAM(s) Oral two times a day  heparin  Injectable 5000 Unit(s) SubCutaneous every 12 hours  insulin lispro (HumaLOG) corrective regimen sliding scale   SubCutaneous three times a day before meals  insulin lispro (HumaLOG) corrective regimen sliding scale   SubCutaneous at bedtime  metoprolol tartrate 100 milliGRAM(s) Oral two times a day  oxyCODONE    5 mG/acetaminophen 325 mG 1 Tablet(s) Oral once  pantoprazole    Tablet 40 milliGRAM(s) Oral before breakfast  piperacillin/tazobactam IVPB.. 3.375 Gram(s) IV Intermittent every 8 hours    Corbin Franklin MD, FACC

## 2019-08-11 NOTE — CHART NOTE - NSCHARTNOTEFT_GEN_A_CORE
Patient seen and examined at bedside, resting comfortably.   Patient states that her abdominal pain has resolved. Only complaining of mild R knee pain.  Tolerating diet. Denies nausea and vomiting.  Denies fevers, chills, dyspnea, chest pain.    Exam:  General: NAD, A&Ox3  Lungs: clear to auscultation b/l  Cardiac: RRR S1/S2  Abdomen: non-distended, normoactive BS, soft, nontender to palpation. no rebound/guarding.      AM Labs:                         11.0   9.24  )-----------( 282      ( 11 Aug 2019 07:44 )             32.4     08-11    133<L>  |  97  |  8   ----------------------------<  120<H>  3.8   |  23  |  0.84    Ca    9.2      11 Aug 2019 07:44  Phos  3.3     08-11  Mg     1.7     08-11    TPro  7.4  /  Alb  2.7<L>  /  TBili  1.5<H>  /  DBili  .61<H>  /  AST  140<H>  /  ALT  368<H>  /  AlkPhos  195<H>  08-11      Radiology:    EXAM:  CT ABDOMEN AND PELVIS OC IC                        COMPARISON: CT dated 8/29/2017, ultrasound dated 8/7/2019 and MRI dated   8/8/2019  PROCEDURE:   CT of the Abdomen and Pelvis was performed with intravenous contrast.   Intravenous contrast: 80 ml Omnipaque 350. 20 ml discarded.  Oral contrast: positive contrast was administered.  Sagittal and coronal reformats wereperformed.    FINDINGS:    LOWER CHEST: Basilar atelectasis. Enlarged heart with coronary artery   calcifications.    LIVER: Multiple subcentimeter low attenuating lesions are present in the   liver, too small to further characterize.  BILE DUCTS: Normal caliber.  GALLBLADDER: Cholelithiasis.  SPLEEN: Within normal limits.  PANCREAS: 1 cm cystic pancreatic body lesion.  ADRENALS: Within normal limits.  KIDNEYS/URETERS: 3 cm left renal cyst. Additional subcentimeter renal   hypodensities are too small to further characterize. No hydronephrosis.    BLADDER: Within normal limits.  REPRODUCTIVE ORGANS: Uterus and adnexa within normal limits.    BOWEL: Duodenal diverticulum. No bowel obstruction. Appendix within   normal limits. Colonic diverticulosis.  PERITONEUM: No ascites.  VESSELS: Atherosclerotic changes.  RETROPERITONEUM/LYMPH NODES: No lymphadenopathy. Several prominent   retroperitoneal lymph nodes.  ABDOMINAL WALL: Tiny fat-containing umbilical hernia.  BONES: Spinal degenerative changes.    IMPRESSION:     Cholelithiasis.  1 cm cystic pancreatic body lesion.    SULTANA LOVE M.D., ATTENDING RADIOLOGIST  This document has been electronically signed. Aug 11 2019  8:30AM      Impression: 86F with PMH OA, HTN, HLD, DM, CHF, gastric ulcer admitted with sepsis, elevated LFTs with suspicion gallstone pancreatitis vs choledocholithiasis vs acute cholecystitis    Plan:  -Possible IR perc joaquin Monday  -Cont medical management  -Continue to trend liver enzymes    -cont diet  -Antipyretics   -Primafit, urine output monitoring, I/Os  -GI follow-up. HIDA and 19-9 ordered by GI  -will discuss with surgical attending Patient seen and examined at bedside, resting comfortably.   Patient states that her abdominal pain has resolved. Only complaining of mild R knee pain.  Tolerating diet. Denies nausea and vomiting.  Denies fevers, chills, dyspnea, chest pain.    Exam:  General: NAD, A&Ox3  Lungs: clear to auscultation b/l  Cardiac: RRR S1/S2  Abdomen: non-distended, normoactive BS, soft, nontender to palpation. no rebound/guarding      AM Labs:                         11.0   9.24  )-----------( 282      ( 11 Aug 2019 07:44 )             32.4     08-11    133<L>  |  97  |  8   ----------------------------<  120<H>  3.8   |  23  |  0.84    Ca    9.2      11 Aug 2019 07:44  Phos  3.3     08-11  Mg     1.7     08-11    TPro  7.4  /  Alb  2.7<L>  /  TBili  1.5<H>  /  DBili  .61<H>  /  AST  140<H>  /  ALT  368<H>  /  AlkPhos  195<H>  08-11      Radiology:    EXAM:  CT ABDOMEN AND PELVIS OC IC                        COMPARISON: CT dated 8/29/2017, ultrasound dated 8/7/2019 and MRI dated   8/8/2019  PROCEDURE:   CT of the Abdomen and Pelvis was performed with intravenous contrast.   Intravenous contrast: 80 ml Omnipaque 350. 20 ml discarded.  Oral contrast: positive contrast was administered.  Sagittal and coronal reformats wereperformed.    FINDINGS:    LOWER CHEST: Basilar atelectasis. Enlarged heart with coronary artery   calcifications.    LIVER: Multiple subcentimeter low attenuating lesions are present in the   liver, too small to further characterize.  BILE DUCTS: Normal caliber.  GALLBLADDER: Cholelithiasis.  SPLEEN: Within normal limits.  PANCREAS: 1 cm cystic pancreatic body lesion.  ADRENALS: Within normal limits.  KIDNEYS/URETERS: 3 cm left renal cyst. Additional subcentimeter renal   hypodensities are too small to further characterize. No hydronephrosis.    BLADDER: Within normal limits.  REPRODUCTIVE ORGANS: Uterus and adnexa within normal limits.    BOWEL: Duodenal diverticulum. No bowel obstruction. Appendix within   normal limits. Colonic diverticulosis.  PERITONEUM: No ascites.  VESSELS: Atherosclerotic changes.  RETROPERITONEUM/LYMPH NODES: No lymphadenopathy. Several prominent   retroperitoneal lymph nodes.  ABDOMINAL WALL: Tiny fat-containing umbilical hernia.  BONES: Spinal degenerative changes.    IMPRESSION:     Cholelithiasis.  1 cm cystic pancreatic body lesion.    SULTANA LOVE M.D., ATTENDING RADIOLOGIST  This document has been electronically signed. Aug 11 2019  8:30AM      Impression: 86F with PMH OA, HTN, HLD, DM, CHF, gastric ulcer admitted with sepsis, elevated LFTs with suspicion gallstone pancreatitis vs choledocholithiasis vs acute cholecystitis    Plan:  -Possible IR perc joaquin Monday  -Cont medical management  -Continue to trend liver enzymes    -cont diet  -Antipyretics   -Primafit, urine output monitoring, I/Os  -GI follow-up. HIDA and 19-9 ordered by GI  -will discuss with surgical attending

## 2019-08-11 NOTE — PROGRESS NOTE ADULT - SUBJECTIVE AND OBJECTIVE BOX
SURGERY PROGRESS HPI:  Pt seen and examined at bedside. Denies abdominal pain. C/o mild R knee pain. No acute events overnight. Pt tolerating dysphagia 1 diet. Pt denies nausea and vomiting.  +BM/flatus. Voiding well. Pt denies chest pain, SOB, dizziness, fever, chills.      Vital Signs Last 24 Hrs  T(C): 37.3 (11 Aug 2019 00:07), Max: 38.1 (10 Aug 2019 16:40)  T(F): 99.2 (11 Aug 2019 00:07), Max: 100.5 (10 Aug 2019 16:40)  HR: 70 (11 Aug 2019 00:07) (70 - 92)  BP: 162/85 (11 Aug 2019 00:07) (115/76 - 162/85)  BP(mean): --  RR: 18 (11 Aug 2019 00:07) (16 - 18)  SpO2: 100% (11 Aug 2019 00:07) (98% - 100%)      PHYSICAL EXAM:    GENERAL: NAD  HEAD:  Atraumatic, Normocephalic  CHEST/LUNG: Clear to ausculation, bilaterally   HEART: RRR S1S2  ABDOMEN: non distended, +BS, soft, non tender, no guarding  : Primafit in place draining clear yellow urine  EXTREMITIES:  calf soft, non tender b/l    I&O's Detail    09 Aug 2019 07:01  -  10 Aug 2019 07:00  --------------------------------------------------------  IN:    dextrose 5% + sodium chloride 0.45% with potassium chloride 20 mEq/L: 1440 mL    IV PiggyBack: 900 mL  Total IN: 2340 mL    OUT:    Voided: 4100 mL  Total OUT: 4100 mL    Total NET: -1760 mL      10 Aug 2019 07:01  -  11 Aug 2019 05:44  --------------------------------------------------------  IN:  Total IN: 0 mL    OUT:    Voided: 800 mL  Total OUT: 800 mL    Total NET: -800 mL          LABS:                        10.3   11.10 )-----------( 238      ( 10 Aug 2019 07:18 )             31.7     08-10    137  |  106  |  6<L>  ----------------------------<  149<H>  3.7   |  23  |  0.60    Ca    8.3<L>      10 Aug 2019 07:18  Phos  2.5     08-10  Mg     1.6     08-10    TPro  6.6  /  Alb  2.5<L>  /  TBili  1.3<H>  /  DBili  .71<H>  /  AST  297<H>  /  ALT  488<H>  /  AlkPhos  164<H>  08-10      < from: CT Abdomen and Pelvis w/ Oral Cont and w/ IV Cont (08.10.19 @ 20:50) >  IMPRESSION:   1. Indeterminate 1.3 cm low attenuation/cystic lesion of the mid body of   the   pancreas.   2. Trace bilateral pleural effusions.  < end of copied text >      Assessment: 86F with PMH OA, HTN, HLD, DM, CHF, gastric ulcer admitted with sepsis, elevated LFTs with suspicion for acute cholecystitis due to biliary calculus, r/o gallstone pancreatitis and choledocholithiasis. Persistent fevers    Plan:  -f/u official CT results  -Possible IR perc joaquin Monday  -Cont medical management  -F/u AM labs   -cont full liquid diet  -Antipyretics   -Primafit, urine output monitoring, I/Os  -GI follow-up. HIDA and 19-9 ordered by GI  -will discuss with surgical attending SURGERY PROGRESS HPI:  Pt seen and examined at bedside. Denies abdominal pain. C/o mild R knee pain. No acute events overnight. Pt tolerating dysphagia 1 diet. Pt denies nausea and vomiting.  +BM/flatus. Voiding well. Pt denies chest pain, SOB, dizziness, fever, chills.      Vital Signs Last 24 Hrs  T(C): 37.3 (11 Aug 2019 00:07), Max: 38.1 (10 Aug 2019 16:40)  T(F): 99.2 (11 Aug 2019 00:07), Max: 100.5 (10 Aug 2019 16:40)  HR: 70 (11 Aug 2019 00:07) (70 - 92)  BP: 162/85 (11 Aug 2019 00:07) (115/76 - 162/85)  BP(mean): --  RR: 18 (11 Aug 2019 00:07) (16 - 18)  SpO2: 100% (11 Aug 2019 00:07) (98% - 100%)      PHYSICAL EXAM:    GENERAL: NAD  HEAD:  Atraumatic, Normocephalic  CHEST/LUNG: Clear to ausculation, bilaterally   HEART: RRR S1S2  ABDOMEN: non distended, +BS, soft, non tender, no guarding  : Primafit in place draining clear yellow urine  EXTREMITIES:  calf soft, non tender b/l    I&O's Detail    09 Aug 2019 07:01  -  10 Aug 2019 07:00  --------------------------------------------------------  IN:    dextrose 5% + sodium chloride 0.45% with potassium chloride 20 mEq/L: 1440 mL    IV PiggyBack: 900 mL  Total IN: 2340 mL    OUT:    Voided: 4100 mL  Total OUT: 4100 mL    Total NET: -1760 mL      10 Aug 2019 07:01  -  11 Aug 2019 05:44  --------------------------------------------------------  IN:  Total IN: 0 mL    OUT:    Voided: 800 mL  Total OUT: 800 mL    Total NET: -800 mL          LABS:                        10.3   11.10 )-----------( 238      ( 10 Aug 2019 07:18 )             31.7     08-10    137  |  106  |  6<L>  ----------------------------<  149<H>  3.7   |  23  |  0.60    Ca    8.3<L>      10 Aug 2019 07:18  Phos  2.5     08-10  Mg     1.6     08-10    TPro  6.6  /  Alb  2.5<L>  /  TBili  1.3<H>  /  DBili  .71<H>  /  AST  297<H>  /  ALT  488<H>  /  AlkPhos  164<H>  08-10      < from: CT Abdomen and Pelvis w/ Oral Cont and w/ IV Cont (08.10.19 @ 20:50) >  IMPRESSION:   1. Indeterminate 1.3 cm low attenuation/cystic lesion of the mid body of   the   pancreas.   2. Trace bilateral pleural effusions.  < end of copied text >      Assessment: 86F with PMH OA, HTN, HLD, DM, CHF, gastric ulcer admitted with sepsis, elevated LFTs with suspicion for acute cholecystitis due to biliary calculus, r/o gallstone pancreatitis and choledocholithiasis. Persistent fevers    Plan:  -f/u official CT results  -Possible IR perc joaquin Monday  -Cont medical management  -F/u AM labs   -cont diet  -Antipyretics   -Primafit, urine output monitoring, I/Os  -GI follow-up. HIDA and 19-9 ordered by GI  -will discuss with surgical attending SURGERY PROGRESS HPI:  Pt seen and examined at bedside. Denies abdominal pain. C/o mild R knee pain. No acute events overnight. Pt tolerating dysphagia 1 diet. Pt denies nausea and vomiting.  +BM/flatus. Voiding well. Pt denies chest pain, SOB, dizziness, fever, chills.      Vital Signs Last 24 Hrs  T(C): 37.3 (11 Aug 2019 00:07), Max: 38.1 (10 Aug 2019 16:40)  T(F): 99.2 (11 Aug 2019 00:07), Max: 100.5 (10 Aug 2019 16:40)  HR: 70 (11 Aug 2019 00:07) (70 - 92)  BP: 162/85 (11 Aug 2019 00:07) (115/76 - 162/85)  BP(mean): --  RR: 18 (11 Aug 2019 00:07) (16 - 18)  SpO2: 100% (11 Aug 2019 00:07) (98% - 100%)      PHYSICAL EXAM:    GENERAL: NAD  HEAD:  Atraumatic, Normocephalic  CHEST/LUNG: Clear to ausculation, bilaterally   HEART: RRR S1S2  ABDOMEN: non distended, +BS, soft, non tender, no guarding  : Primafit in place draining clear yellow urine  EXTREMITIES:  calf soft, non tender b/l    I&O's Detail    09 Aug 2019 07:01  -  10 Aug 2019 07:00  --------------------------------------------------------  IN:    dextrose 5% + sodium chloride 0.45% with potassium chloride 20 mEq/L: 1440 mL    IV PiggyBack: 900 mL  Total IN: 2340 mL    OUT:    Voided: 4100 mL  Total OUT: 4100 mL    Total NET: -1760 mL      10 Aug 2019 07:01  -  11 Aug 2019 05:44  --------------------------------------------------------  IN:  Total IN: 0 mL    OUT:    Voided: 800 mL  Total OUT: 800 mL    Total NET: -800 mL          LABS:                        10.3   11.10 )-----------( 238      ( 10 Aug 2019 07:18 )             31.7     08-10    137  |  106  |  6<L>  ----------------------------<  149<H>  3.7   |  23  |  0.60    Ca    8.3<L>      10 Aug 2019 07:18  Phos  2.5     08-10  Mg     1.6     08-10    TPro  6.6  /  Alb  2.5<L>  /  TBili  1.3<H>  /  DBili  .71<H>  /  AST  297<H>  /  ALT  488<H>  /  AlkPhos  164<H>  08-10      < from: CT Abdomen and Pelvis w/ Oral Cont and w/ IV Cont (08.10.19 @ 20:50) >  IMPRESSION:   1. Indeterminate 1.3 cm low attenuation/cystic lesion of the mid body of   the   pancreas.   2. Trace bilateral pleural effusions.  < end of copied text >      Assessment: 86F with PMH OA, HTN, HLD, DM, CHF, gastric ulcer admitted with sepsis, elevated LFTs with suspicion gallstone pancreatitis vs choledocholithiasis vs acute cholecystitis  Plan:  -f/u official CT results  -Possible IR perc joaquin Monday  -Cont medical management  -F/u AM labs   -cont diet  -Antipyretics   -Primafit, urine output monitoring, I/Os  -GI follow-up. HIDA and 19-9 ordered by GI  -will discuss with surgical attending

## 2019-08-11 NOTE — PROGRESS NOTE ADULT - SUBJECTIVE AND OBJECTIVE BOX
HPI:  85 y/o female PMHx HTN, HLD, DM, OA, chronic diastolic CHF with preserved LVEF, gastric ulcer presents to the ER c/o periumbilical/epigastric pain.   Admitted with sepsis, elevated LFTs with suspicion for acute cholecystitis due to biliary calculus, r/o gallstone pancreatitis and choledocholithiasis.   Patient resting comfortably, denies abdominal pain, nausea/vomiting. Offers no complaints. Denies chest pain, sob, dizziness.    No further abd complaints. Has knee pain.  No chest complaints.    REVIEW OF SYSTEMS:    CONSTITUTIONAL: + fatigue  EYES: No eye pain, visual disturbances, or discharge  ENMT:  No difficulty hearing, tinnitus, vertigo; No sinus or throat pain  NECK: No pain or stiffness  BREASTS: No pain, masses, or nipple discharge  RESPIRATORY: No cough, wheezing, chills or hemoptysis; No shortness of breath  CARDIOVASCULAR: No chest pain, palpitations, dizziness, or leg swelling  GASTROINTESTINAL: mild abd pain  GENITOURINARY: No dysuria, frequency, hematuria, or incontinence  NEUROLOGICAL: No headaches, memory loss, loss of strength, numbness, or tremors  SKIN: No itching, burning, rashes, or lesions   LYMPH NODES: No enlarged glands  ENDOCRINE: No heat or cold intolerance; No hair loss  MUSCULOSKELETAL: No joint pain or swelling; No muscle, back, or extremity pain  PSYCHIATRIC: No depression, anxiety, mood swings, or difficulty sleeping  HEME/LYMPH: No easy bruising, or bleeding gums  ALLERGY AND IMMUNOLOGIC: No hives or eczema        PHYSICAL EXAMINATION:  -----------------------------  Vital Signs Last 24 Hrs  T(C): 37.2 (11 Aug 2019 11:27), Max: 38.1 (10 Aug 2019 16:40)  T(F): 99 (11 Aug 2019 11:27), Max: 100.5 (10 Aug 2019 16:40)  HR: 78 (11 Aug 2019 11:27) (70 - 92)  BP: 134/70 (11 Aug 2019 11:27) (134/70 - 162/85)  RR: 16 (11 Aug 2019 11:27) (16 - 18)  SpO2: 97% (11 Aug 2019 11:27) (97% - 100%)    Constitutional:  no acute distress.   Eyes: the conjunctiva exhibited no abnormalities and the eyelids demonstrated no xanthelasmas.   HEENT: normal oral mucosa, no oral pallor and no oral cyanosis.   Neck: normal jugular venous A waves present, normal jugular venous V waves present and no jugular venous almaraz A waves.   Pulmonary: no respiratory distress, normal respiratory rhythm and effort, no accessory muscle use and lungs were clear to auscultation bilaterally.   Cardiovascular: heart rate and rhythm were normal; 2/6 SM  Abdomen: mildly tender to palpation  Musculoskeletal: the gait could not be assessed.   Extremities: no clubbing of the fingernails, no localized cyanosis, no petechial hemorrhages and no ischemic changes.   Skin: normal skin color and pigmentation, no rash, no venous stasis, no skin lesions, no skin ulcer and no xanthoma was observed.   Psychiatric: oriented to person    LABS:   --------                          11.0   9.24  )-----------( 282      ( 11 Aug 2019 07:44 )             32.4     08-11    133<L>  |  97  |  8   ----------------------------<  120<H>  3.8   |  23  |  0.84    Ca    9.2      11 Aug 2019 07:44  Phos  3.3     08-11  Mg     1.7     08-11    TPro  7.4  /  Alb  2.7<L>  /  TBili  1.5<H>  /  DBili  .61<H>  /  AST  140<H>  /  ALT  368<H>  /  AlkPhos  195<H>  08-11          RADIOLOGY:  -----------------    ECG: sinus tach 111; RBBB; inferior and inferolateral ST depressions    < from: Transthoracic Echocardiogram (08.30.17 @ 09:30) >  1. Normal left ventricular systolic function. No segmental  wall motion abnormalities.  2. Mild diastolic dysfunction (Stage I).  3. Normal right ventricular size and function.  4. No significant valvular disease.    < end of copied text >    < from: TTE Echo Doppler w/o Cont (08.08.19 @ 12:50) >  Summary:   1. Left ventricular ejection fraction, by visual estimation, is 60 to   65%.   2. Normal global left ventricular systolic function.   3. Mildly increased LV wall thickness.   4. Normal left ventricular internal cavity size.   5. Spectral Doppler shows pseudonormal pattern of left ventricular   myocardial filling (Grade II diastolic dysfunction).   6. Normal right ventricular size and function.   7. There is no evidence of pericardial effusion.   8. Mild mitral valve regurgitation.   9. Mild thickening and calcification of the anterior and posterior   mitral valve leaflets.  10. Estimated pulmonary artery systolic pressure is 39.0 mmHg assuming a   right atrial pressure of 5 mmHg, which is consistent with borderline   pulmonary hypertension.    < end of copied text >

## 2019-08-12 LAB
ALBUMIN SERPL ELPH-MCNC: 2.5 G/DL — LOW (ref 3.3–5)
ALP SERPL-CCNC: 170 U/L — HIGH (ref 40–120)
ALT FLD-CCNC: 234 U/L — HIGH (ref 12–78)
ANION GAP SERPL CALC-SCNC: 11 MMOL/L — SIGNIFICANT CHANGE UP (ref 5–17)
APPEARANCE UR: CLEAR — SIGNIFICANT CHANGE UP
AST SERPL-CCNC: 67 U/L — HIGH (ref 15–37)
BILIRUB DIRECT SERPL-MCNC: 0.8 MG/DL — HIGH (ref 0.05–0.2)
BILIRUB INDIRECT FLD-MCNC: 0.5 MG/DL — SIGNIFICANT CHANGE UP (ref 0.2–1)
BILIRUB SERPL-MCNC: 1.3 MG/DL — HIGH (ref 0.2–1.2)
BILIRUB UR-MCNC: NEGATIVE — SIGNIFICANT CHANGE UP
BUN SERPL-MCNC: 15 MG/DL — SIGNIFICANT CHANGE UP (ref 7–23)
CALCIUM SERPL-MCNC: 8.9 MG/DL — SIGNIFICANT CHANGE UP (ref 8.5–10.1)
CHLORIDE SERPL-SCNC: 94 MMOL/L — LOW (ref 96–108)
CO2 SERPL-SCNC: 23 MMOL/L — SIGNIFICANT CHANGE UP (ref 22–31)
COLOR SPEC: YELLOW — SIGNIFICANT CHANGE UP
CREAT SERPL-MCNC: 1.02 MG/DL — SIGNIFICANT CHANGE UP (ref 0.5–1.3)
DIFF PNL FLD: ABNORMAL
EPI CELLS # UR: SIGNIFICANT CHANGE UP
GLUCOSE SERPL-MCNC: 131 MG/DL — HIGH (ref 70–99)
GLUCOSE UR QL: NEGATIVE MG/DL — SIGNIFICANT CHANGE UP
HCT VFR BLD CALC: 28.8 % — LOW (ref 34.5–45)
HGB BLD-MCNC: 9.9 G/DL — LOW (ref 11.5–15.5)
KETONES UR-MCNC: NEGATIVE — SIGNIFICANT CHANGE UP
LEUKOCYTE ESTERASE UR-ACNC: ABNORMAL
LIDOCAIN IGE QN: 116 U/L — SIGNIFICANT CHANGE UP (ref 73–393)
MAGNESIUM SERPL-MCNC: 2 MG/DL — SIGNIFICANT CHANGE UP (ref 1.6–2.6)
MCHC RBC-ENTMCNC: 27.3 PG — SIGNIFICANT CHANGE UP (ref 27–34)
MCHC RBC-ENTMCNC: 34.4 GM/DL — SIGNIFICANT CHANGE UP (ref 32–36)
MCV RBC AUTO: 79.6 FL — LOW (ref 80–100)
NITRITE UR-MCNC: NEGATIVE — SIGNIFICANT CHANGE UP
NRBC # BLD: 0 /100 WBCS — SIGNIFICANT CHANGE UP (ref 0–0)
PH UR: 7 — SIGNIFICANT CHANGE UP (ref 5–8)
PHOSPHATE SERPL-MCNC: 3.4 MG/DL — SIGNIFICANT CHANGE UP (ref 2.5–4.5)
PLATELET # BLD AUTO: 293 K/UL — SIGNIFICANT CHANGE UP (ref 150–400)
POTASSIUM SERPL-MCNC: 3.7 MMOL/L — SIGNIFICANT CHANGE UP (ref 3.5–5.3)
POTASSIUM SERPL-SCNC: 3.7 MMOL/L — SIGNIFICANT CHANGE UP (ref 3.5–5.3)
PROT SERPL-MCNC: 6.7 GM/DL — SIGNIFICANT CHANGE UP (ref 6–8.3)
PROT UR-MCNC: 30 MG/DL
RBC # BLD: 3.62 M/UL — LOW (ref 3.8–5.2)
RBC # FLD: 14.3 % — SIGNIFICANT CHANGE UP (ref 10.3–14.5)
RBC CASTS # UR COMP ASSIST: ABNORMAL /HPF (ref 0–4)
SODIUM SERPL-SCNC: 128 MMOL/L — LOW (ref 135–145)
SP GR SPEC: 1.01 — SIGNIFICANT CHANGE UP (ref 1.01–1.02)
UROBILINOGEN FLD QL: NEGATIVE MG/DL — SIGNIFICANT CHANGE UP
WBC # BLD: 8.45 K/UL — SIGNIFICANT CHANGE UP (ref 3.8–10.5)
WBC # FLD AUTO: 8.45 K/UL — SIGNIFICANT CHANGE UP (ref 3.8–10.5)
WBC UR QL: SIGNIFICANT CHANGE UP

## 2019-08-12 PROCEDURE — 99233 SBSQ HOSP IP/OBS HIGH 50: CPT

## 2019-08-12 PROCEDURE — 99221 1ST HOSP IP/OBS SF/LOW 40: CPT

## 2019-08-12 PROCEDURE — 78226 HEPATOBILIARY SYSTEM IMAGING: CPT | Mod: 26

## 2019-08-12 PROCEDURE — 93970 EXTREMITY STUDY: CPT | Mod: 26

## 2019-08-12 PROCEDURE — 99232 SBSQ HOSP IP/OBS MODERATE 35: CPT

## 2019-08-12 PROCEDURE — ZZZZZ: CPT

## 2019-08-12 RX ORDER — MORPHINE SULFATE 50 MG/1
2 CAPSULE, EXTENDED RELEASE ORAL ONCE
Refills: 0 | Status: DISCONTINUED | OUTPATIENT
Start: 2019-08-12 | End: 2019-08-12

## 2019-08-12 RX ORDER — DEXTROSE MONOHYDRATE, SODIUM CHLORIDE, AND POTASSIUM CHLORIDE 50; .745; 4.5 G/1000ML; G/1000ML; G/1000ML
1000 INJECTION, SOLUTION INTRAVENOUS
Refills: 0 | Status: DISCONTINUED | OUTPATIENT
Start: 2019-08-12 | End: 2019-08-13

## 2019-08-12 RX ADMIN — Medication 650 MILLIGRAM(S): at 04:40

## 2019-08-12 RX ADMIN — Medication 20 MILLIGRAM(S): at 17:51

## 2019-08-12 RX ADMIN — DEXTROSE MONOHYDRATE, SODIUM CHLORIDE, AND POTASSIUM CHLORIDE 75 MILLILITER(S): 50; .745; 4.5 INJECTION, SOLUTION INTRAVENOUS at 08:06

## 2019-08-12 RX ADMIN — Medication 100 MILLIGRAM(S): at 06:13

## 2019-08-12 RX ADMIN — Medication 20 MILLIGRAM(S): at 06:13

## 2019-08-12 RX ADMIN — Medication 1: at 16:51

## 2019-08-12 RX ADMIN — Medication 650 MILLIGRAM(S): at 06:28

## 2019-08-12 RX ADMIN — DEXTROSE MONOHYDRATE, SODIUM CHLORIDE, AND POTASSIUM CHLORIDE 75 MILLILITER(S): 50; .745; 4.5 INJECTION, SOLUTION INTRAVENOUS at 22:35

## 2019-08-12 RX ADMIN — PIPERACILLIN AND TAZOBACTAM 25 GRAM(S): 4; .5 INJECTION, POWDER, LYOPHILIZED, FOR SOLUTION INTRAVENOUS at 06:12

## 2019-08-12 RX ADMIN — HEPARIN SODIUM 5000 UNIT(S): 5000 INJECTION INTRAVENOUS; SUBCUTANEOUS at 06:16

## 2019-08-12 RX ADMIN — ATORVASTATIN CALCIUM 20 MILLIGRAM(S): 80 TABLET, FILM COATED ORAL at 22:36

## 2019-08-12 RX ADMIN — MORPHINE SULFATE 2 MILLIGRAM(S): 50 CAPSULE, EXTENDED RELEASE ORAL at 11:21

## 2019-08-12 RX ADMIN — HEPARIN SODIUM 5000 UNIT(S): 5000 INJECTION INTRAVENOUS; SUBCUTANEOUS at 17:51

## 2019-08-12 RX ADMIN — PIPERACILLIN AND TAZOBACTAM 25 GRAM(S): 4; .5 INJECTION, POWDER, LYOPHILIZED, FOR SOLUTION INTRAVENOUS at 22:35

## 2019-08-12 RX ADMIN — PIPERACILLIN AND TAZOBACTAM 25 GRAM(S): 4; .5 INJECTION, POWDER, LYOPHILIZED, FOR SOLUTION INTRAVENOUS at 14:44

## 2019-08-12 RX ADMIN — Medication 100 MILLIGRAM(S): at 17:50

## 2019-08-12 RX ADMIN — AMLODIPINE BESYLATE 10 MILLIGRAM(S): 2.5 TABLET ORAL at 06:13

## 2019-08-12 NOTE — PROGRESS NOTE ADULT - SUBJECTIVE AND OBJECTIVE BOX
Patient is a 86y old  Female who presents with a chief complaint of Abdominal pain (12 Aug 2019 09:31)      INTERVAL HPI/OVERNIGHT EVENTS:  Pt was seen and examined, no acute events.    MEDICATIONS  (STANDING):  amLODIPine   Tablet 10 milliGRAM(s) Oral daily  atorvastatin 20 milliGRAM(s) Oral at bedtime  dextrose 5% + sodium chloride 0.9% with potassium chloride 20 mEq/L 1000 milliLiter(s) (75 mL/Hr) IV Continuous <Continuous>  dextrose 5%. 1000 milliLiter(s) (50 mL/Hr) IV Continuous <Continuous>  dextrose 50% Injectable 12.5 Gram(s) IV Push once  dextrose 50% Injectable 25 Gram(s) IV Push once  dextrose 50% Injectable 25 Gram(s) IV Push once  enalapril 20 milliGRAM(s) Oral two times a day  heparin  Injectable 5000 Unit(s) SubCutaneous every 12 hours  insulin lispro (HumaLOG) corrective regimen sliding scale   SubCutaneous three times a day before meals  insulin lispro (HumaLOG) corrective regimen sliding scale   SubCutaneous at bedtime  metoprolol tartrate 100 milliGRAM(s) Oral two times a day  pantoprazole    Tablet 40 milliGRAM(s) Oral before breakfast  piperacillin/tazobactam IVPB.. 3.375 Gram(s) IV Intermittent every 8 hours    MEDICATIONS  (PRN):  acetaminophen   Tablet .. 650 milliGRAM(s) Oral every 6 hours PRN Temp greater or equal to 38C (100.4F), Mild Pain (1 - 3)  dextrose 40% Gel 15 Gram(s) Oral once PRN Blood Glucose LESS THAN 70 milliGRAM(s)/deciliter  glucagon  Injectable 1 milliGRAM(s) IntraMuscular once PRN Glucose LESS THAN 70 milligrams/deciliter  loratadine 10 milliGRAM(s) Oral daily PRN allergies  ondansetron Injectable 4 milliGRAM(s) IV Push every 6 hours PRN Nausea      Allergies    dust (Sneezing)  No Known Drug Allergies  POLLEN (Rhinorrhea; Sneezing)      Vital Signs Last 24 Hrs  T(C): 36.7 (12 Aug 2019 09:30), Max: 38.2 (12 Aug 2019 04:36)  T(F): 98.1 (12 Aug 2019 09:30), Max: 100.8 (12 Aug 2019 04:36)  HR: 66 (12 Aug 2019 09:30) (61 - 81)  BP: 130/66 (12 Aug 2019 09:30) (121/67 - 141/85)  BP(mean): --  RR: 16 (12 Aug 2019 09:30) (16 - 17)  SpO2: 99% (12 Aug 2019 09:30) (96% - 100%)    PHYSICAL EXAM:  GENERAL: NAD  HEAD:  Atraumatic  EYES: PERRLA  NERVOUS SYSTEM:  A, O x 3, non focal  CHEST/LUNG: Clear  HEART: RRR  ABDOMEN: Soft, non tender  EXTREMITIES:  no edema      LABS:                        9.9    8.45  )-----------( 293      ( 12 Aug 2019 07:28 )             28.8     08-12    128<L>  |  94<L>  |  15  ----------------------------<  131<H>  3.7   |  23  |  1.02    Ca    8.9      12 Aug 2019 07:28  Phos  3.4     08-12  Mg     2.0     08-12    TPro  6.7  /  Alb  2.5<L>  /  TBili  1.3<H>  /  DBili  .80<H>  /  AST  67<H>  /  ALT  234<H>  /  AlkPhos  170<H>  08-12        CAPILLARY BLOOD GLUCOSE      POCT Blood Glucose.: 132 mg/dL (12 Aug 2019 07:33)  POCT Blood Glucose.: 148 mg/dL (11 Aug 2019 22:45)  POCT Blood Glucose.: 228 mg/dL (11 Aug 2019 17:27)      Culture - Blood (collected 07 Aug 2019 23:03)  Source: .Blood  Preliminary Report (09 Aug 2019 01:02):    No growth to date.    Culture - Blood (collected 07 Aug 2019 23:03)  Source: .Blood  Preliminary Report (09 Aug 2019 01:02):    No growth to date.    Culture - Urine (collected 07 Aug 2019 22:55)  Source: .Urine  Final Report (08 Aug 2019 22:31):    <10,000 CFU/mL Normal Urogenital Angela      RADIOLOGY & ADDITIONAL TESTS:    Imaging Personally Reviewed:  [ ] YES  [ ] NO    Consultant(s) Notes Reviewed:  [ ] YES  [ ] NO    Care Discussed with Consultants/Other Providers [ ] YES  [ ] NO

## 2019-08-12 NOTE — PHYSICAL THERAPY INITIAL EVALUATION ADULT - BED MOBILITY TRAINING, PT EVAL
Pt will independently perform all aspects of bed mobility to help prevent pressure ulcers, by 2 weeks

## 2019-08-12 NOTE — PROGRESS NOTE ADULT - SUBJECTIVE AND OBJECTIVE BOX
Patient seen and examined bedside resting comfortably.  Feels well, denies abdominal pain, nausea and vomiting.   C/o b/l knee pain.  Febrile to 100.8 overnight.  +BM yesterday  Tolerating diet, NPO after midnight for planned HIDA scan this am.   Denies chest pain, dyspnea, cough.    T(F): 98.8 (08-12-19 @ 06:11), Max: 100.8 (08-12-19 @ 04:36)  HR: 75 (08-12-19 @ 04:36) (61 - 81)  BP: 141/85 (08-12-19 @ 04:36) (121/67 - 141/85)  RR: 16 (08-12-19 @ 04:36) (16 - 17)  SpO2: 96% (08-12-19 @ 04:36) (96% - 100%)      POCT Blood Glucose.: 148 mg/dL (11 Aug 2019 22:45)  POCT Blood Glucose.: 228 mg/dL (11 Aug 2019 17:27)  POCT Blood Glucose.: 152 mg/dL (11 Aug 2019 11:09)  POCT Blood Glucose.: 125 mg/dL (11 Aug 2019 07:54)      PHYSICAL EXAM:  General: NAD, WDWN, pleasant and well-appearing  Neuro:  Alert & oriented  HEENT: NCAT, EOMI, conjunctiva clear  CV: +S1+S2 regular rate and rhythm  Lung: clear to auscultation bilaterally, respirations nonlabored, good inspiratory effort  Abdomen: soft, NTND. Normoactive BS  Extremities: no pedal edema or calf tenderness noted    LABS:                 pending    Cancer Antigen, GI Ca 19-9 (08.11.19 @ 12:23)    Cancer Antigen, GI Ca 19-9: 162: Method: Roche Electrochemiluminescence Immuno Assay  Values obtained with different assay methods or kits cannot be  used interchangeably.  Results cannot be interpreted as absolute evidence of the presence  or absence of malignant disease. U/mL      Culture Results:   No growth to date. (08-07 @ 23:03)  Culture Results:   No growth to date. (08-07 @ 23:03)  Culture Results:   <10,000 CFU/mL Normal Urogenital Angela (08-07 @ 22:55)      TTE 8/8 noted, EF 60-65%      Assessment: 86F with PMH OA, HTN, HLD, DM, CHF, gastric ulcer admitted with sepsis, elevated LFTs with suspicion for gallstone pancreatitis vs acute cholecystitis  Cholelithiasis seen on CT and MRCP; no choledocholithiasis. +pancreatic body pseudocyst vs IPMN  Elevated Ca 19-9    Plan:  -c/w zosyn. NPO for HIDA today; possible IR perc joaquin. IVF added while NPO.  -c/w heparin vte ppx  -GI and medical input appreciated. Cardio sign off noted  -analgesia for arthritic knee pain, PT eval requested  -F/u AM labs

## 2019-08-12 NOTE — PHYSICAL THERAPY INITIAL EVALUATION ADULT - ADDITIONAL COMMENTS
Pt states she has a cane and rolling walker. Pt lives in a private house with her daughter. Called daughter [phone] and [phone] to confirm PLOF with no answer

## 2019-08-12 NOTE — PROGRESS NOTE ADULT - SUBJECTIVE AND OBJECTIVE BOX
No c/o abd pain  on zosyn      MEDICATIONS  (STANDING):  amLODIPine   Tablet 10 milliGRAM(s) Oral daily  atorvastatin 20 milliGRAM(s) Oral at bedtime  dextrose 5% + sodium chloride 0.9% with potassium chloride 20 mEq/L 1000 milliLiter(s) (75 mL/Hr) IV Continuous <Continuous>  dextrose 5%. 1000 milliLiter(s) (50 mL/Hr) IV Continuous <Continuous>  dextrose 50% Injectable 12.5 Gram(s) IV Push once  dextrose 50% Injectable 25 Gram(s) IV Push once  dextrose 50% Injectable 25 Gram(s) IV Push once  enalapril 20 milliGRAM(s) Oral two times a day  heparin  Injectable 5000 Unit(s) SubCutaneous every 12 hours  insulin lispro (HumaLOG) corrective regimen sliding scale   SubCutaneous three times a day before meals  insulin lispro (HumaLOG) corrective regimen sliding scale   SubCutaneous at bedtime  metoprolol tartrate 100 milliGRAM(s) Oral two times a day  pantoprazole    Tablet 40 milliGRAM(s) Oral before breakfast  piperacillin/tazobactam IVPB.. 3.375 Gram(s) IV Intermittent every 8 hours    MEDICATIONS  (PRN):  acetaminophen   Tablet .. 650 milliGRAM(s) Oral every 6 hours PRN Temp greater or equal to 38C (100.4F), Mild Pain (1 - 3)  dextrose 40% Gel 15 Gram(s) Oral once PRN Blood Glucose LESS THAN 70 milliGRAM(s)/deciliter  glucagon  Injectable 1 milliGRAM(s) IntraMuscular once PRN Glucose LESS THAN 70 milligrams/deciliter  loratadine 10 milliGRAM(s) Oral daily PRN allergies  ondansetron Injectable 4 milliGRAM(s) IV Push every 6 hours PRN Nausea      Allergies    dust (Sneezing)  No Known Drug Allergies  POLLEN (Rhinorrhea; Sneezing)    Intolerances        Vital Signs Last 24 Hrs  T(C): 36.7 (12 Aug 2019 09:30), Max: 38.2 (12 Aug 2019 04:36)  T(F): 98.1 (12 Aug 2019 09:30), Max: 100.8 (12 Aug 2019 04:36)  HR: 66 (12 Aug 2019 09:30) (61 - 81)  BP: 130/66 (12 Aug 2019 09:30) (121/67 - 141/85)  BP(mean): --  RR: 16 (12 Aug 2019 09:30) (16 - 17)  SpO2: 99% (12 Aug 2019 09:30) (96% - 100%)    PHYSICAL EXAM:  General: NAD.  CVS: S1, S2  Chest: air entry bilaterally present  Abd: BS present, soft, non-tender      LABS:                        9.9    8.45  )-----------( 293      ( 12 Aug 2019 07:28 )             28.8     08-12    128<L>  |  94<L>  |  15  ----------------------------<  131<H>  3.7   |  23  |  1.02    Ca    8.9      12 Aug 2019 07:28  Phos  3.4     08-12  Mg     2.0     08-12    TPro  6.7  /  Alb  2.5<L>  /  TBili  1.3<H>  /  DBili  .80<H>  /  AST  67<H>  /  ALT  234<H>  /  AlkPhos  170<H>  08-12    pt presently stable - had probable cholecystitis  conservative management for now

## 2019-08-12 NOTE — CONSULT NOTE ADULT - ATTENDING COMMENTS
Cystic duct is patent based on HIDA, and GB is essentially collapsed around a stone. No role for perc joaquin at this time

## 2019-08-12 NOTE — CONSULT NOTE ADULT - SUBJECTIVE AND OBJECTIVE BOX
Patient is a 86y old  Female who presents with a chief complaint of Abdominal pain (12 Aug 2019 14:59)      HPI:  87 y/o female PMHx DM, HTN, HLD, OA, CHF, gastric ulcer presents to the ER c/o periumbilical/epigastric pain since 4pm. Patient reports that pain was previously severe but now the pain has completely resolved without any analgesics in the ER. Reports 4 episodes of NBNB emesis after drinking ginger ale and water. Reports normal BM/flatus. Last BM was yesterday. Denies abdominal pain after eating. Patient denies fever, chills, nausea, vomiting, constipation, diarrhea, hematuria, melena, hematochezia, chest pain, shortness of breath, dizziness. Patient denies prior incident. (08 Aug 2019 04:38)      General:  No wt loss, fevers, chills, night sweats  Eyes:  Good vision, no reported pain  CV:  No pain, palpitations,   Resp:  No dyspnea, cough, tachypnea, wheezing  GI:  No pain, nausea, vomiting, diarrhea, constipation  :  No pain, bleeding, incontinence, nocturia  Muscle:  No pain, weakness  Neuro:  No weakness, tingling, memory problems  Psych:  No fatigue, insomnia, mood problems, depression  Endocrine:  No polyuria, polydypsia, cold/heat intolerance  Heme:  No petechiae, ecchymosis, easy bruisability  Skin:  No rash, tattoos, scars, edema    PAST MEDICAL & SURGICAL HISTORY:  Osteoarthritis  Chronic hip pain  Chronic shoulder pain  Hyperlipemia  DM (diabetes mellitus)  HTN (hypertension)  No significant past surgical history      Allergies    dust (Sneezing)  No Known Drug Allergies  POLLEN (Rhinorrhea; Sneezing)    Intolerances        MEDICATIONS  (STANDING):  amLODIPine   Tablet 10 milliGRAM(s) Oral daily  atorvastatin 20 milliGRAM(s) Oral at bedtime  dextrose 5% + sodium chloride 0.9% with potassium chloride 20 mEq/L 1000 milliLiter(s) (75 mL/Hr) IV Continuous <Continuous>  dextrose 5%. 1000 milliLiter(s) (50 mL/Hr) IV Continuous <Continuous>  dextrose 50% Injectable 12.5 Gram(s) IV Push once  dextrose 50% Injectable 25 Gram(s) IV Push once  dextrose 50% Injectable 25 Gram(s) IV Push once  enalapril 20 milliGRAM(s) Oral two times a day  heparin  Injectable 5000 Unit(s) SubCutaneous every 12 hours  insulin lispro (HumaLOG) corrective regimen sliding scale   SubCutaneous three times a day before meals  insulin lispro (HumaLOG) corrective regimen sliding scale   SubCutaneous at bedtime  metoprolol tartrate 100 milliGRAM(s) Oral two times a day  pantoprazole    Tablet 40 milliGRAM(s) Oral before breakfast  piperacillin/tazobactam IVPB.. 3.375 Gram(s) IV Intermittent every 8 hours    MEDICATIONS  (PRN):  acetaminophen   Tablet .. 650 milliGRAM(s) Oral every 6 hours PRN Temp greater or equal to 38C (100.4F), Mild Pain (1 - 3)  dextrose 40% Gel 15 Gram(s) Oral once PRN Blood Glucose LESS THAN 70 milliGRAM(s)/deciliter  glucagon  Injectable 1 milliGRAM(s) IntraMuscular once PRN Glucose LESS THAN 70 milligrams/deciliter  loratadine 10 milliGRAM(s) Oral daily PRN allergies  ondansetron Injectable 4 milliGRAM(s) IV Push every 6 hours PRN Nausea        SOCIAL HISTORY:    FAMILY HISTORY:  FH: diabetes mellitus        PHYSICAL EXAM:    Vital Signs Last 24 Hrs  T(C): 36.7 (12 Aug 2019 09:30), Max: 38.2 (12 Aug 2019 04:36)  T(F): 98.1 (12 Aug 2019 09:30), Max: 100.8 (12 Aug 2019 04:36)  HR: 66 (12 Aug 2019 09:30) (61 - 81)  BP: 130/66 (12 Aug 2019 09:30) (121/67 - 141/85)  BP(mean): --  RR: 16 (12 Aug 2019 09:30) (16 - 17)  SpO2: 99% (12 Aug 2019 09:30) (96% - 100%)    General:  Appears stated age, well-groomed, well-nourished, no distress  Lungs:  CTAB  Cardiovascular:  good S1, S2,   Abdomen:  Soft, non-tender, non-distended,   Extremities:  no calf tenderness/swelling b/l  Musculoskeletal:  Full ROM in all joints w/o swelling/tenderness/effusion  Neuro/Psych:  A &O x 3    LABS:                        9.9    8.45  )-----------( 293      ( 12 Aug 2019 07:28 )             28.8     08-12    128<L>  |  94<L>  |  15  ----------------------------<  131<H>  3.7   |  23  |  1.02    Ca    8.9      12 Aug 2019 07:28  Phos  3.4     08-12  Mg     2.0     08-12    TPro  6.7  /  Alb  2.5<L>  /  TBili  1.3<H>  /  DBili  .80<H>  /  AST  67<H>  /  ALT  234<H>  /  AlkPhos  170<H>  08-12          RADIOLOGY & ADDITIONAL STUDIES: Patient is a 86y old  Female who presents with a chief complaint of Abdominal pain (12 Aug 2019 14:59)    IR consulted for percutaneous cholecystostomy tube placement.  86F with PMH OA, HTN, HLD, DM, CHF, gastric ulcer admitted with sepsis, elevated LFTs with suspicion for gallstone pancreatitis vs acute cholecystitis.  Cholelithiasis seen on CT and MRCP; no choledocholithiasis. +pancreatic body pseudocyst vs IPMN. LFT's continue to improve slowly.   Still febrile..    Pt now improving with no c/o abdominal pain.  Pts abdomen is soft, ND, non tender to palpation and neg [place]  HIDA shows negative for acute cholecystitis      HPI:  85 y/o female PMHx DM, HTN, HLD, OA, CHF, gastric ulcer presents to the ER c/o periumbilical/epigastric pain since 4pm. Patient reports that pain was previously severe but now the pain has completely resolved without any analgesics in the ER. Reports 4 episodes of NBNB emesis after drinking ginger ale and water. Reports normal BM/flatus. Last BM was yesterday. Denies abdominal pain after eating. Patient denies fever, chills, nausea, vomiting, constipation, diarrhea, hematuria, melena, hematochezia, chest pain, shortness of breath, dizziness. Patient denies prior incident. (08 Aug 2019 04:38)          PAST MEDICAL & SURGICAL HISTORY:  Osteoarthritis  Chronic hip pain  Chronic shoulder pain  Hyperlipemia  DM (diabetes mellitus)  HTN (hypertension)  No significant past surgical history      Allergies    dust (Sneezing)  No Known Drug Allergies  POLLEN (Rhinorrhea; Sneezing)    Intolerances        MEDICATIONS  (STANDING):  amLODIPine   Tablet 10 milliGRAM(s) Oral daily  atorvastatin 20 milliGRAM(s) Oral at bedtime  dextrose 5% + sodium chloride 0.9% with potassium chloride 20 mEq/L 1000 milliLiter(s) (75 mL/Hr) IV Continuous <Continuous>  dextrose 5%. 1000 milliLiter(s) (50 mL/Hr) IV Continuous <Continuous>  dextrose 50% Injectable 12.5 Gram(s) IV Push once  dextrose 50% Injectable 25 Gram(s) IV Push once  dextrose 50% Injectable 25 Gram(s) IV Push once  enalapril 20 milliGRAM(s) Oral two times a day  heparin  Injectable 5000 Unit(s) SubCutaneous every 12 hours  insulin lispro (HumaLOG) corrective regimen sliding scale   SubCutaneous three times a day before meals  insulin lispro (HumaLOG) corrective regimen sliding scale   SubCutaneous at bedtime  metoprolol tartrate 100 milliGRAM(s) Oral two times a day  pantoprazole    Tablet 40 milliGRAM(s) Oral before breakfast  piperacillin/tazobactam IVPB.. 3.375 Gram(s) IV Intermittent every 8 hours    MEDICATIONS  (PRN):  acetaminophen   Tablet .. 650 milliGRAM(s) Oral every 6 hours PRN Temp greater or equal to 38C (100.4F), Mild Pain (1 - 3)  dextrose 40% Gel 15 Gram(s) Oral once PRN Blood Glucose LESS THAN 70 milliGRAM(s)/deciliter  glucagon  Injectable 1 milliGRAM(s) IntraMuscular once PRN Glucose LESS THAN 70 milligrams/deciliter  loratadine 10 milliGRAM(s) Oral daily PRN allergies  ondansetron Injectable 4 milliGRAM(s) IV Push every 6 hours PRN Nausea        SOCIAL HISTORY:    FAMILY HISTORY:  FH: diabetes mellitus        PHYSICAL EXAM:    Vital Signs Last 24 Hrs  T(C): 36.7 (12 Aug 2019 09:30), Max: 38.2 (12 Aug 2019 04:36)  T(F): 98.1 (12 Aug 2019 09:30), Max: 100.8 (12 Aug 2019 04:36)  HR: 66 (12 Aug 2019 09:30) (61 - 81)  BP: 130/66 (12 Aug 2019 09:30) (121/67 - 141/85)  BP(mean): --  RR: 16 (12 Aug 2019 09:30) (16 - 17)  SpO2: 99% (12 Aug 2019 09:30) (96% - 100%)    General:  Appears stated age, well-groomed, well-nourished, no distress  Lungs:  CTAB  Cardiovascular:  good S1, S2,   Abdomen:  Soft, non-tender, non-distended,   Extremities:  no calf tenderness/swelling b/l  Neuro/Psych:  A &O x 3    LABS:                        9.9    8.45  )-----------( 293      ( 12 Aug 2019 07:28 )             28.8     08-12    128<L>  |  94<L>  |  15  ----------------------------<  131<H>  3.7   |  23  |  1.02    Ca    8.9      12 Aug 2019 07:28  Phos  3.4     08-12  Mg     2.0     08-12    TPro  6.7  /  Alb  2.5<L>  /  TBili  1.3<H>  /  DBili  .80<H>  /  AST  67<H>  /  ALT  234<H>  /  AlkPhos  170<H>  08-12          RADIOLOGY & ADDITIONAL STUDIES:

## 2019-08-12 NOTE — PROGRESS NOTE ADULT - SUBJECTIVE AND OBJECTIVE BOX
Surgery NP note  Patient seen and examined bedside resting comfortably.  Patient c/o new onset bilateral calf pain.    Pt. feels well otherwise, denies abdominal pain, nausea and vomiting.   +BM.    Patient was febrile overnight to 100.5, but now currently afebrile at 98.8.   Patient is NPO since midnight for planned HIDA scan this am.  Denies chest pain, dyspnea, cough.     T(F): 98.8 (08-12-19 @ 06:11), Max: 100.8 (08-12-19 @ 04:36)  HR: 75 (08-12-19 @ 04:36) (61 - 81)  BP: 141/85 (08-12-19 @ 04:36) (121/67 - 141/85)  RR: 16 (08-12-19 @ 04:36) (16 - 17)  SpO2: 96% (08-12-19 @ 04:36) (96% - 100%)    POCT Blood Glucose.: 132 mg/dL (12 Aug 2019 07:33)  POCT Blood Glucose.: 148 mg/dL (11 Aug 2019 22:45)  POCT Blood Glucose.: 228 mg/dL (11 Aug 2019 17:27)  POCT Blood Glucose.: 152 mg/dL (11 Aug 2019 11:09)      PHYSICAL EXAM:  General: NAD, WDWN.   Neuro:  Alert & responsive  HEENT: NCAT, EOMI, conjunctiva clear  CV: +S1+S2 regular rate and rhythm  Lung: clear to ausculation bilaterally, respirations nonlabored, good inspiratory effort  Abdomen: non-distended, +BS, soft, Non tender.   Extremities: +calf tenderness with palpation. +Bang's. No pedal edema or calf swelling.     LABS:                        9.9    8.45  )-----------( 293      ( 12 Aug 2019 07:28 )             28.8     08-12    128<L>  |  94<L>  |  15  ----------------------------<  131<H>  3.7   |  23  |  1.02    Ca    8.9      12 Aug 2019 07:28  Phos  3.4     08-12  Mg     2.0     08-12    TPro  6.7  /  Alb  2.5<L>  /  TBili  1.3<H>  /  DBili  .80<H>  /  AST  67<H>  /  ALT  234<H>  /  AlkPhos  170<H>  08-12    LIVER FUNCTIONS - ( 12 Aug 2019 07:28 )  Alb: 2.5 g/dL / Pro: 6.7 gm/dL / ALK PHOS: 170 U/L / ALT: 234 U/L / AST: 67 U/L / GGT: x             Assessment: 86F with PMH OA, HTN, HLD, DM, CHF, gastric ulcer admitted with sepsis, elevated LFTs with suspicion for gallstone pancreatitis vs acute cholecystitis  Cholelithiasis seen on CT and MRCP; no choledocholithiasis. +pancreatic body pseudocyst vs IPMN.   Elevated Ca 19-9    Plan:  -Bilateral LE duplex US, R/O DVT.  -continue zosyn. NPO for HIDA today; possible IR perc joaquin. IVF added while NPO.  -continue VTE prophylaxis with SQ heparin and SCDs  -Increase activity with PT, OOB, Ambulate  -f/u AM labs  -will discuss with surgical attending for Recommendations. Surgery NP note  Patient seen and examined bedside resting comfortably.  Patient c/o new onset bilateral calf pain.    Pt. feels well otherwise, denies abdominal pain, nausea and vomiting.   +BM.    Patient was febrile overnight to 100.5. Tmax 100.8  Patient is NPO since midnight for planned HIDA scan this am.  Denies chest pain, dyspnea, cough.     T(F): 98.8 (08-12-19 @ 06:11), Max: 100.8 (08-12-19 @ 04:36)  HR: 75 (08-12-19 @ 04:36) (61 - 81)  BP: 141/85 (08-12-19 @ 04:36) (121/67 - 141/85)  RR: 16 (08-12-19 @ 04:36) (16 - 17)  SpO2: 96% (08-12-19 @ 04:36) (96% - 100%)    POCT Blood Glucose.: 132 mg/dL (12 Aug 2019 07:33)  POCT Blood Glucose.: 148 mg/dL (11 Aug 2019 22:45)  POCT Blood Glucose.: 228 mg/dL (11 Aug 2019 17:27)  POCT Blood Glucose.: 152 mg/dL (11 Aug 2019 11:09)      PHYSICAL EXAM:  General: NAD, WDWN.   Neuro:  Alert & responsive  HEENT: NCAT, EOMI, conjunctiva clear  CV: +S1+S2 regular rate and rhythm  Lung: clear to ausculation bilaterally, respirations nonlabored, good inspiratory effort  Abdomen: non-distended, +BS, soft, Non tender.   Extremities: +calf tenderness with palpation. +Bang's. No pedal edema or calf swelling.     LABS:                        9.9    8.45  )-----------( 293      ( 12 Aug 2019 07:28 )             28.8     08-12    128<L>  |  94<L>  |  15  ----------------------------<  131<H>  3.7   |  23  |  1.02    Ca    8.9      12 Aug 2019 07:28  Phos  3.4     08-12  Mg     2.0     08-12    TPro  6.7  /  Alb  2.5<L>  /  TBili  1.3<H>  /  DBili  .80<H>  /  AST  67<H>  /  ALT  234<H>  /  AlkPhos  170<H>  08-12    LIVER FUNCTIONS - ( 12 Aug 2019 07:28 )  Alb: 2.5 g/dL / Pro: 6.7 gm/dL / ALK PHOS: 170 U/L / ALT: 234 U/L / AST: 67 U/L / GGT: x             Assessment: 86F with PMH OA, HTN, HLD, DM, CHF, gastric ulcer admitted with sepsis, elevated LFTs with suspicion for gallstone pancreatitis vs acute cholecystitis  Cholelithiasis seen on CT and MRCP; no choledocholithiasis. +pancreatic body pseudocyst vs IPMN. Now with bilateral calf tenderness.  Elevated Ca 19-9    Plan:  -Bilateral LE duplex US, R/O DVT.  -continue zosyn. NPO for HIDA today; possible IR perc joaquin. IVF added while NPO.  -continue VTE prophylaxis with SQ heparin. Hold SCDs.  -Increase activity with PT, OOB, Ambulate  -f/u AM labs  -will discuss with surgical attending for Recommendations. Surgery NP note  Patient seen and examined bedside resting comfortably.  Patient c/o new onset bilateral calf pain.    Pt. feels well otherwise, denies abdominal pain, nausea and vomiting.   +BM.    Patient was febrile overnight to 100.5. Tmax 100.8  Patient is NPO since midnight for planned HIDA scan this am.  Denies chest pain, dyspnea, cough.     T(F): 98.8 (08-12-19 @ 06:11), Max: 100.8 (08-12-19 @ 04:36)  HR: 75 (08-12-19 @ 04:36) (61 - 81)  BP: 141/85 (08-12-19 @ 04:36) (121/67 - 141/85)  RR: 16 (08-12-19 @ 04:36) (16 - 17)  SpO2: 96% (08-12-19 @ 04:36) (96% - 100%)    POCT Blood Glucose.: 132 mg/dL (12 Aug 2019 07:33)  POCT Blood Glucose.: 148 mg/dL (11 Aug 2019 22:45)  POCT Blood Glucose.: 228 mg/dL (11 Aug 2019 17:27)  POCT Blood Glucose.: 152 mg/dL (11 Aug 2019 11:09)      PHYSICAL EXAM:  General: NAD, WDWN.   Neuro:  Alert & responsive  HEENT: NCAT, EOMI, conjunctiva clear  CV: +S1+S2 regular rate and rhythm  Lung: clear to ausculation bilaterally, respirations nonlabored, good inspiratory effort  Abdomen: non-distended, +BS, soft, Non tender.   Extremities: +calf tenderness with palpation. +Bang's. No pedal edema or calf swelling.     LABS:                        9.9    8.45  )-----------( 293      ( 12 Aug 2019 07:28 )             28.8     08-12    128<L>  |  94<L>  |  15  ----------------------------<  131<H>  3.7   |  23  |  1.02    Ca    8.9      12 Aug 2019 07:28  Phos  3.4     08-12  Mg     2.0     08-12    TPro  6.7  /  Alb  2.5<L>  /  TBili  1.3<H>  /  DBili  .80<H>  /  AST  67<H>  /  ALT  234<H>  /  AlkPhos  170<H>  08-12    LIVER FUNCTIONS - ( 12 Aug 2019 07:28 )  Alb: 2.5 g/dL / Pro: 6.7 gm/dL / ALK PHOS: 170 U/L / ALT: 234 U/L / AST: 67 U/L / GGT: x             Assessment: 86F with PMH OA, HTN, HLD, DM, CHF, gastric ulcer admitted with sepsis, elevated LFTs with suspicion for gallstone pancreatitis vs acute cholecystitis  Cholelithiasis seen on CT and MRCP; no choledocholithiasis. +pancreatic body pseudocyst vs IPMN. LFT's continue to improve slowly. Now with bilateral calf tenderness.  Still febrile.     Plan:  -Bilateral LE duplex US, R/O DVT.  -continue zosyn. NPO for HIDA today; possible IR perc joaquin. IVF added while NPO.  -continue VTE prophylaxis with SQ heparin. Hold SCDs.  -Increase activity with PT, OOB, Ambulate  -f/u AM labs  -will discuss with surgical attending for Recommendations.

## 2019-08-12 NOTE — PROGRESS NOTE ADULT - ASSESSMENT
85 y/o female with history of GI  ulcer presents with right upper quadrant abdominal pain, found to have gall stone.    Cholelithiases  - No choledocholithiasis in MRCP  - HIDA is neg for acute cholecystitis  - Possible OR per Sx.      Chronic diastolic congestive heart failure:  - Stable  - Continue home med      DM:  - Insulin SS      Hyponatremia:  - Now NPO on IVF  - Follow repeat Na      HTN:  - Continue current meds, BP acceptable

## 2019-08-12 NOTE — PROGRESS NOTE ADULT - ATTENDING COMMENTS
I agree with the findings and assessment and plan as provided.
I agree with the findings and assessment and plan.
I agree with the assessment and plan as provided.
I agree with the findings and assessment and plan.

## 2019-08-12 NOTE — CONSULT NOTE ADULT - ASSESSMENT
86F with PMH OA, HTN, HLD, DM, CHF, gastric ulcer admitted with sepsis, elevated LFTs with suspicion for gallstone pancreatitis vs acute cholecystitis  Cholelithiasis seen on CT and MRCP; no choledocholithiasis. +pancreatic body pseudocyst vs IPMN.  still febrile,  LFT's continue to improve slowly.   Pt feeling better, HIDA is normal  IR consulted for percutaneous cholecystostomy tube placement

## 2019-08-12 NOTE — CONSULT NOTE ADULT - PROBLEM SELECTOR RECOMMENDATION 9
currently stable continue current meds
-Percutaneous cholecystostomy tube cancelled, HIDA negative for acute cholecystitis  -please reconsult if patient worsens

## 2019-08-13 ENCOUNTER — TRANSCRIPTION ENCOUNTER (OUTPATIENT)
Age: 84
End: 2019-08-13

## 2019-08-13 LAB
ALBUMIN SERPL ELPH-MCNC: 2.2 G/DL — LOW (ref 3.3–5)
ALP SERPL-CCNC: 181 U/L — HIGH (ref 40–120)
ALT FLD-CCNC: 140 U/L — HIGH (ref 12–78)
ANION GAP SERPL CALC-SCNC: 6 MMOL/L — SIGNIFICANT CHANGE UP (ref 5–17)
AST SERPL-CCNC: 43 U/L — HIGH (ref 15–37)
BASOPHILS # BLD AUTO: 0.03 K/UL — SIGNIFICANT CHANGE UP (ref 0–0.2)
BASOPHILS NFR BLD AUTO: 0.4 % — SIGNIFICANT CHANGE UP (ref 0–2)
BILIRUB DIRECT SERPL-MCNC: 0.4 MG/DL — HIGH (ref 0.05–0.2)
BILIRUB INDIRECT FLD-MCNC: 0.6 MG/DL — SIGNIFICANT CHANGE UP (ref 0.2–1)
BILIRUB SERPL-MCNC: 1 MG/DL — SIGNIFICANT CHANGE UP (ref 0.2–1.2)
BUN SERPL-MCNC: 8 MG/DL — SIGNIFICANT CHANGE UP (ref 7–23)
CALCIUM SERPL-MCNC: 8.5 MG/DL — SIGNIFICANT CHANGE UP (ref 8.5–10.1)
CHLORIDE SERPL-SCNC: 104 MMOL/L — SIGNIFICANT CHANGE UP (ref 96–108)
CO2 SERPL-SCNC: 24 MMOL/L — SIGNIFICANT CHANGE UP (ref 22–31)
CREAT SERPL-MCNC: 0.58 MG/DL — SIGNIFICANT CHANGE UP (ref 0.5–1.3)
CULTURE RESULTS: SIGNIFICANT CHANGE UP
CULTURE RESULTS: SIGNIFICANT CHANGE UP
EOSINOPHIL # BLD AUTO: 0.18 K/UL — SIGNIFICANT CHANGE UP (ref 0–0.5)
EOSINOPHIL NFR BLD AUTO: 2.3 % — SIGNIFICANT CHANGE UP (ref 0–6)
GLUCOSE SERPL-MCNC: 149 MG/DL — HIGH (ref 70–99)
HCT VFR BLD CALC: 28.3 % — LOW (ref 34.5–45)
HGB BLD-MCNC: 9.7 G/DL — LOW (ref 11.5–15.5)
IMM GRANULOCYTES NFR BLD AUTO: 0.5 % — SIGNIFICANT CHANGE UP (ref 0–1.5)
LYMPHOCYTES # BLD AUTO: 1.55 K/UL — SIGNIFICANT CHANGE UP (ref 1–3.3)
LYMPHOCYTES # BLD AUTO: 20.1 % — SIGNIFICANT CHANGE UP (ref 13–44)
MAGNESIUM SERPL-MCNC: 2.5 MG/DL — SIGNIFICANT CHANGE UP (ref 1.6–2.6)
MCHC RBC-ENTMCNC: 27.8 PG — SIGNIFICANT CHANGE UP (ref 27–34)
MCHC RBC-ENTMCNC: 34.3 GM/DL — SIGNIFICANT CHANGE UP (ref 32–36)
MCV RBC AUTO: 81.1 FL — SIGNIFICANT CHANGE UP (ref 80–100)
MONOCYTES # BLD AUTO: 1.12 K/UL — HIGH (ref 0–0.9)
MONOCYTES NFR BLD AUTO: 14.5 % — HIGH (ref 2–14)
NEUTROPHILS # BLD AUTO: 4.79 K/UL — SIGNIFICANT CHANGE UP (ref 1.8–7.4)
NEUTROPHILS NFR BLD AUTO: 62.2 % — SIGNIFICANT CHANGE UP (ref 43–77)
NRBC # BLD: 0 /100 WBCS — SIGNIFICANT CHANGE UP (ref 0–0)
PHOSPHATE SERPL-MCNC: 2.1 MG/DL — LOW (ref 2.5–4.5)
PLATELET # BLD AUTO: 293 K/UL — SIGNIFICANT CHANGE UP (ref 150–400)
POTASSIUM SERPL-MCNC: 3.8 MMOL/L — SIGNIFICANT CHANGE UP (ref 3.5–5.3)
POTASSIUM SERPL-SCNC: 3.8 MMOL/L — SIGNIFICANT CHANGE UP (ref 3.5–5.3)
PROT SERPL-MCNC: 6.6 GM/DL — SIGNIFICANT CHANGE UP (ref 6–8.3)
RBC # BLD: 3.49 M/UL — LOW (ref 3.8–5.2)
RBC # FLD: 14.5 % — SIGNIFICANT CHANGE UP (ref 10.3–14.5)
SODIUM SERPL-SCNC: 134 MMOL/L — LOW (ref 135–145)
SPECIMEN SOURCE: SIGNIFICANT CHANGE UP
SPECIMEN SOURCE: SIGNIFICANT CHANGE UP
WBC # BLD: 7.71 K/UL — SIGNIFICANT CHANGE UP (ref 3.8–10.5)
WBC # FLD AUTO: 7.71 K/UL — SIGNIFICANT CHANGE UP (ref 3.8–10.5)

## 2019-08-13 PROCEDURE — 99233 SBSQ HOSP IP/OBS HIGH 50: CPT

## 2019-08-13 PROCEDURE — 71045 X-RAY EXAM CHEST 1 VIEW: CPT | Mod: 26

## 2019-08-13 RX ADMIN — Medication 100 MILLIGRAM(S): at 05:50

## 2019-08-13 RX ADMIN — PIPERACILLIN AND TAZOBACTAM 25 GRAM(S): 4; .5 INJECTION, POWDER, LYOPHILIZED, FOR SOLUTION INTRAVENOUS at 22:01

## 2019-08-13 RX ADMIN — Medication 1: at 08:08

## 2019-08-13 RX ADMIN — Medication 20 MILLIGRAM(S): at 05:51

## 2019-08-13 RX ADMIN — Medication 650 MILLIGRAM(S): at 15:36

## 2019-08-13 RX ADMIN — AMLODIPINE BESYLATE 10 MILLIGRAM(S): 2.5 TABLET ORAL at 05:51

## 2019-08-13 RX ADMIN — HEPARIN SODIUM 5000 UNIT(S): 5000 INJECTION INTRAVENOUS; SUBCUTANEOUS at 05:51

## 2019-08-13 RX ADMIN — PIPERACILLIN AND TAZOBACTAM 25 GRAM(S): 4; .5 INJECTION, POWDER, LYOPHILIZED, FOR SOLUTION INTRAVENOUS at 05:51

## 2019-08-13 RX ADMIN — HEPARIN SODIUM 5000 UNIT(S): 5000 INJECTION INTRAVENOUS; SUBCUTANEOUS at 17:43

## 2019-08-13 RX ADMIN — Medication 2: at 16:42

## 2019-08-13 RX ADMIN — Medication 20 MILLIGRAM(S): at 17:43

## 2019-08-13 RX ADMIN — ATORVASTATIN CALCIUM 20 MILLIGRAM(S): 80 TABLET, FILM COATED ORAL at 22:01

## 2019-08-13 RX ADMIN — PIPERACILLIN AND TAZOBACTAM 25 GRAM(S): 4; .5 INJECTION, POWDER, LYOPHILIZED, FOR SOLUTION INTRAVENOUS at 13:17

## 2019-08-13 RX ADMIN — Medication 650 MILLIGRAM(S): at 14:37

## 2019-08-13 RX ADMIN — Medication 62.5 MILLIMOLE(S): at 14:37

## 2019-08-13 RX ADMIN — Medication 100 MILLIGRAM(S): at 17:43

## 2019-08-13 RX ADMIN — PANTOPRAZOLE SODIUM 40 MILLIGRAM(S): 20 TABLET, DELAYED RELEASE ORAL at 08:08

## 2019-08-13 NOTE — PROGRESS NOTE ADULT - ASSESSMENT
85 y/o female with history of GI  ulcer presents with right upper quadrant abdominal pain, found to have gall stone.    Cholelithiases  - No choledocholithiasis in MRCP  - HIDA is neg for acute cholecystitis  - No Sx planned, outpt lap joaquin      Chronic diastolic congestive heart failure:  - Stable  - Continue home med      DM:  - Insulin SS      Hyponatremia:  - Improved    HTN:  - Continue current meds, BP acceptable

## 2019-08-13 NOTE — PROGRESS NOTE ADULT - SUBJECTIVE AND OBJECTIVE BOX
Patient is a 86y old  Female who presents with a chief complaint of Abdominal pain (13 Aug 2019 12:35)      INTERVAL HPI/OVERNIGHT EVENTS:  Pt was seen and examined, no acute events.    MEDICATIONS  (STANDING):  amLODIPine   Tablet 10 milliGRAM(s) Oral daily  atorvastatin 20 milliGRAM(s) Oral at bedtime  dextrose 5%. 1000 milliLiter(s) (50 mL/Hr) IV Continuous <Continuous>  dextrose 50% Injectable 12.5 Gram(s) IV Push once  dextrose 50% Injectable 25 Gram(s) IV Push once  dextrose 50% Injectable 25 Gram(s) IV Push once  enalapril 20 milliGRAM(s) Oral two times a day  heparin  Injectable 5000 Unit(s) SubCutaneous every 12 hours  insulin lispro (HumaLOG) corrective regimen sliding scale   SubCutaneous three times a day before meals  insulin lispro (HumaLOG) corrective regimen sliding scale   SubCutaneous at bedtime  metoprolol tartrate 100 milliGRAM(s) Oral two times a day  pantoprazole    Tablet 40 milliGRAM(s) Oral before breakfast  piperacillin/tazobactam IVPB.. 3.375 Gram(s) IV Intermittent every 8 hours    MEDICATIONS  (PRN):  acetaminophen   Tablet .. 650 milliGRAM(s) Oral every 6 hours PRN Temp greater or equal to 38C (100.4F), Mild Pain (1 - 3)  dextrose 40% Gel 15 Gram(s) Oral once PRN Blood Glucose LESS THAN 70 milliGRAM(s)/deciliter  glucagon  Injectable 1 milliGRAM(s) IntraMuscular once PRN Glucose LESS THAN 70 milligrams/deciliter  loratadine 10 milliGRAM(s) Oral daily PRN allergies  ondansetron Injectable 4 milliGRAM(s) IV Push every 6 hours PRN Nausea      Allergies  dust (Sneezing)  No Known Drug Allergies  POLLEN (Rhinorrhea; Sneezing)        Vital Signs Last 24 Hrs  T(C): 37 (13 Aug 2019 17:19), Max: 37.9 (12 Aug 2019 18:43)  T(F): 98.6 (13 Aug 2019 17:19), Max: 100.3 (12 Aug 2019 18:43)  HR: 86 (13 Aug 2019 17:19) (75 - 103)  BP: 136/68 (13 Aug 2019 17:19) (115/58 - 151/76)  BP(mean): 77 (13 Aug 2019 11:03) (77 - 77)  RR: 18 (13 Aug 2019 17:19) (16 - 18)  SpO2: 100% (13 Aug 2019 17:19) (99% - 100%)    PHYSICAL EXAM:  GENERAL: NAD  HEAD:  Atraumatic  EYES: PERRLA  NERVOUS SYSTEM:  Awake, alert  CHEST/LUNG: Clear  HEART: RRR  ABDOMEN: Soft, non tender  EXTREMITIES:  no edema      LABS:                        9.7    7.71  )-----------( 293      ( 13 Aug 2019 07:45 )             28.3     08-13    134<L>  |  104  |  8   ----------------------------<  149<H>  3.8   |  24  |  0.58    Ca    8.5      13 Aug 2019 07:45  Phos  2.1       Mg     2.5         TPro  6.6  /  Alb  2.2<L>  /  TBili  1.0  /  DBili  .40<H>  /  AST  43<H>  /  ALT  140<H>  /  AlkPhos  181<H>        Urinalysis Basic - ( 12 Aug 2019 21:03 )    Color: Yellow / Appearance: Clear / S.010 / pH: x  Gluc: x / Ketone: Negative  / Bili: Negative / Urobili: Negative mg/dL   Blood: x / Protein: 30 mg/dL / Nitrite: Negative   Leuk Esterase: Small / RBC: 3-5 /HPF / WBC 3-5   Sq Epi: x / Non Sq Epi: Few / Bacteria: x      CAPILLARY BLOOD GLUCOSE      POCT Blood Glucose.: 204 mg/dL (13 Aug 2019 16:03)  POCT Blood Glucose.: 139 mg/dL (13 Aug 2019 10:45)  POCT Blood Glucose.: 152 mg/dL (13 Aug 2019 07:21)  POCT Blood Glucose.: 198 mg/dL (12 Aug 2019 22:00)      RADIOLOGY & ADDITIONAL TESTS:    Imaging Personally Reviewed:  [ ] YES  [ ] NO    Consultant(s) Notes Reviewed:  [ ] YES  [ ] NO    Care Discussed with Consultants/Other Providers [ ] YES  [ ] NO

## 2019-08-13 NOTE — DISCHARGE NOTE PROVIDER - HOSPITAL COURSE
Patient is an 87 y/o female PMHx DM, HTN, HLD, OA, CHF, gastric ulcer who presented to the ER on 8/8/19 c/o abdominal pain and vomiting. An ultrasound showed < from: US Abdomen Complete (08.07.19 @ 23:58) >    IMPRESSION:     Gallstones with mild wall thickening and pericholecystic fluid. Negative     sonographic Rock's sign. Findings suspicious for acute cholecystitis.      If clinically indicated, further evaluation with nuclear HIDA scan may be     considered.    0.9 cm cystic lesion in the body of the pancreas appears enlarged     compared to the previous CT of August 29, 2017. If clinically indicated,     further evaluation with MRI/MRCP may be considered.        Patient was admitted for acute cholecystitis vs gallstone pancreatitis.    She was treated with IV Zosyn.     MRCP on 8/8 was negative for choledocholithiasis.     CT on 8/10 was positive for cholelithiasis and pancreatic lesion.    HIDA on 8/12 was negative for acute cholecystitis, so planned IR percutaneous cholecystostomy was canceled.    Due to persistent low grade temps, dopplers and CXR were ordered which were negative.         When patient was hemodynamically stable, tolerating a regular diet, pain-free, and afebrile, she was discharged to subacute rehab as per PT recommendations. 85 y/o female PMHx DM, HTN, HLD, OA, CHF, gastric ulcer presents to the ER c/o periumbilical/epigastric pain since 4pm. Patient reports that pain was previously severe but now the pain has completely resolved without any analgesics in the ER. Reports 4 episodes of NBNB emesis after drinking ginger ale and water. Reports normal BM/flatus. Last BM was yesterday. Denies abdominal pain after eating. Patient denies fever, chills, nausea, vomiting, constipation, diarrhea, hematuria, melena, hematochezia, chest pain, shortness of breath, dizziness. Patient denies prior incident.             US Abdomen Complete         IMPRESSION:     Gallstones with mild wall thickening and pericholecystic fluid. Negative     sonographic Rock's sign. Findings suspicious for acute cholecystitis.      If clinically indicated, further evaluation with nuclear HIDA scan may be     considered.    0.9 cm cystic lesion in the body of the pancreas appears enlarged     compared to the previous CT of August 29, 2017. If clinically indicated,     further evaluation with MRI/MRCP may be considered.        Patient was admitted for acute cholecystitis vs gallstone pancreatitis.    She was treated with IV Zosyn.     MRCP on 8/8 was negative for choledocholithiasis.     CT on 8/10 was positive for cholelithiasis and pancreatic lesion.    HIDA on 8/12 was negative for acute cholecystitis, so planned IR percutaneous cholecystostomy was canceled.    Due to persistent low grade temps, dopplers and CXR and CT chest were ordered which were negative infectious process.         The patient was also evaluated for bilateral knee pain. LE venous doppler revealed popliteal cyst. MRI of knees revealed bilateral popliteal cyst that rupture with edema. Orthopedic surgery was consulted and knee aspiration was done. Fluid analysis was not consistent with septic arthritis but was positive for Calcium pyrophosphate. The patient was given Colchicine.

## 2019-08-13 NOTE — CHART NOTE - NSCHARTNOTEFT_GEN_A_CORE
Patient seen for follow up.  Eating lunch, tolerating. Denies abdominal pain, nausea, vomiting.  Denies fever, chills, chest pain, sob.  Has been OOB with PT.  Voiding without issue.    AM labs reviewed:                        9.7    7.71  )-----------( 293      ( 13 Aug 2019 07:45 )             28.3   08-13    134<L>  |  104  |  8   ----------------------------<  149<H>  3.8   |  24  |  0.58    Ca    8.5      13 Aug 2019 07:45  Phos  2.1     08-13  Mg     2.5     08-13    TPro  6.6  /  Alb  2.2<L>  /  TBili  1.0  /  DBili  .40<H>  /  AST  43<H>  /  ALT  140<H>  /  AlkPhos  181<H>  08-13    Abdominal exam: Soft, NTND    Plan:  Phosphorous repleted.   Discussed case with Dr. Mcdowell. Surgically stable for discharge to subacute rehab on PO augmentin.  Discussed with GI; patient stable for discharge with outpatient follow up.  Discussed with medicine; patient cleared for discharge.

## 2019-08-13 NOTE — DISCHARGE NOTE PROVIDER - CARE PROVIDER_API CALL
Villa Rowell)  Surgery  [address] [facility], [address]  [place] [address]  Phone: [phone]  Fax: [phone]  Follow Up Time:     Yfn Gracia)  Internal Medicine  [address] [facility], [address]  [place] [address]  Phone: [phone]  Fax: [phone]  Follow Up Time: Villa Rowell (MD)  Surgery  [address] [facility]  [place] [address]  Phone: [phone]  Fax: [phone]  Follow Up Time:     Yfn Gracia)  Internal Medicine  [address] [facility], [address]  [place] [address]  Phone: [phone]  Fax: [phone]  Follow Up Time:     Sebas Mojica (DO)  Orthopaedic Surgery  [address] [place] [address]  Phone: [phone]  Fax: [phone]  Follow Up Time:

## 2019-08-13 NOTE — DISCHARGE NOTE PROVIDER - NSDCCPCAREPLAN_GEN_ALL_CORE_FT
PRINCIPAL DISCHARGE DIAGNOSIS  Diagnosis: Cholecystitis  Assessment and Plan of Treatment: Please continue your antibiotics as prescribed. Please follow up with Dr. Rowell.   Please return to the emergency room if you experience recurrence/worsening of symptoms, or onset of new symptoms such as fever, chills, chest pain, shortness of breath, intractable pain, vomiting, inability to have a bowel movement. PRINCIPAL DISCHARGE DIAGNOSIS  Diagnosis: Cholelithiasis  Assessment and Plan of Treatment: - No choledocholithiasis in MRCP  - HIDA is neg for acute cholecystitis  -s/p Antibiotic course, outpt lap joaquin recommended   - Please follow up with Surgery Dr. Rowell.      SECONDARY DISCHARGE DIAGNOSES  Diagnosis: Popliteal cyst  Assessment and Plan of Treatment: Bilateral Rupture Popliteal cyst, Severe Osteoarthritis, Pseudogout   Rehab on discharge, Analgesics as needed, Colchicine as needed.  Follow up with Orthopedic surgery with Dr. Mojica as outpatient    Diagnosis: Pseudogout  Assessment and Plan of Treatment: colchicine as needed.    Diagnosis: Chronic diastolic congestive heart failure  Assessment and Plan of Treatment: Chronic diastolic congestive heart failure:  Continue Enalapril and metoprolol  Follow up with PCP for monitoring.    Diagnosis: Essential hypertension  Assessment and Plan of Treatment: Continue with home medications.    Diagnosis: Type 2 diabetes mellitus without complication, without long-term current use of insulin  Assessment and Plan of Treatment: Continue with Metformin and FS monitoring

## 2019-08-13 NOTE — DISCHARGE NOTE PROVIDER - PROVIDER TOKENS
PROVIDER:[TOKEN:[[id number]]],PROVIDER:[TOKEN:[[id number]]] PROVIDER:[TOKEN:[[id number]]],PROVIDER:[TOKEN:[[id number]]],PROVIDER:[TOKEN:[[id number]]]

## 2019-08-13 NOTE — PROGRESS NOTE ADULT - SUBJECTIVE AND OBJECTIVE BOX
SURGERY PROGRESS HPI:  Pt seen and examined at bedside. Denies abdominal pain. C/o b/l knee pain. No acute events overnight. Pt tolerating regular diet. Pt denies nausea and vomiting. Normal BM/flatus. Pt denies chest pain, SOB, dizziness, fever, chills.     Vital Signs Last 24 Hrs  T(C): 37.2 (13 Aug 2019 04:50), Max: 37.9 (12 Aug 2019 18:43)  T(F): 99 (13 Aug 2019 04:50), Max: 100.3 (12 Aug 2019 18:43)  HR: 82 (13 Aug 2019 04:50) (66 - 103)  BP: 151/76 (13 Aug 2019 04:50) (130/66 - 151/76)  BP(mean): --  RR: 16 (13 Aug 2019 04:50) (15 - 17)  SpO2: 99% (13 Aug 2019 04:50) (94% - 100%)      PHYSICAL EXAM:    GENERAL: NAD  HEAD:  Atraumatic, Normocephalic  CHEST/LUNG: Clear to ausculation, bilaterally   HEART: RRR S1S2  ABDOMEN: non distended, +BS, soft, non tender, no guarding  EXTREMITIES:  calf soft, non tender b/l    I&O's Detail    12 Aug 2019 07:01  -  13 Aug 2019 05:48  --------------------------------------------------------  IN:    Solution: 100 mL  Total IN: 100 mL    OUT:  Total OUT: 0 mL    Total NET: 100 mL    LABS:                        9.9    8.45  )-----------( 293      ( 12 Aug 2019 07:28 )             28.8     08-12    128<L>  |  94<L>  |  15  ----------------------------<  131<H>  3.7   |  23  |  1.02    Ca    8.9      12 Aug 2019 07:28  Phos  3.4     08-12  Mg     2.0     08-12    TPro  6.7  /  Alb  2.5<L>  /  TBili  1.3<H>  /  DBili  .80<H>  /  AST  67<H>  /  ALT  234<H>  /  AlkPhos  170<H>  08-12      Urinalysis Basic - ( 12 Aug 2019 21:03 )    Color: Yellow / Appearance: Clear / S.010 / pH: x  Gluc: x / Ketone: Negative  / Bili: Negative / Urobili: Negative mg/dL   Blood: x / Protein: 30 mg/dL / Nitrite: Negative   Leuk Esterase: Small / RBC: 3-5 /HPF / WBC 3-5   Sq Epi: x / Non Sq Epi: Few / Bacteria: x    < from: US Duplex Venous Lower Ext Complete, Bilateral (19 @ 13:59) >  IMPRESSION:   No evidence of deep venous thrombosis in either lower extremity.  < end of copied text >      Assessment: 86F with PMH OA, HTN, HLD, DM, CHF, gastric ulcer admitted with sepsis, elevated LFTs with suspicion for gallstone pancreatitis vs acute cholecystitis  Cholelithiasis seen on CT and MRCP; no choledocholithiasis. +pancreatic body pseudocyst vs IPMN  Elevated Ca 19-9. HIDA negative. IR consulted, no need for perc joaquin. UTI. No DVTs.    Plan:  -c/w zosyn  -c/w heparin vte ppx  -GI and medical input appreciated. Cardio sign off noted  -analgesia for arthritic knee pain, PT eval requested  -F/u AM labs and CXR SURGERY PROGRESS HPI:  Pt seen and examined at bedside. Denies abdominal pain. C/o b/l knee pain. No acute events overnight. Pt tolerating regular diet. Pt denies nausea and vomiting. Normal BM/flatus. Pt denies chest pain, SOB, dizziness, fever, chills.     Vital Signs Last 24 Hrs  T(C): 37.2 (13 Aug 2019 04:50), Max: 37.9 (12 Aug 2019 18:43)  T(F): 99 (13 Aug 2019 04:50), Max: 100.3 (12 Aug 2019 18:43)  HR: 82 (13 Aug 2019 04:50) (66 - 103)  BP: 151/76 (13 Aug 2019 04:50) (130/66 - 151/76)  BP(mean): --  RR: 16 (13 Aug 2019 04:50) (15 - 17)  SpO2: 99% (13 Aug 2019 04:50) (94% - 100%)      PHYSICAL EXAM:    GENERAL: NAD  HEAD:  Atraumatic, Normocephalic  CHEST/LUNG: Clear to ausculation, bilaterally   HEART: RRR S1S2  ABDOMEN: non distended, +BS, soft, non tender, no guarding  EXTREMITIES:  calf soft, non tender b/l    I&O's Detail    12 Aug 2019 07:01  -  13 Aug 2019 05:48  --------------------------------------------------------  IN:    Solution: 100 mL  Total IN: 100 mL    OUT:  Total OUT: 0 mL    Total NET: 100 mL    LABS:                        9.9    8.45  )-----------( 293      ( 12 Aug 2019 07:28 )             28.8     08-12    128<L>  |  94<L>  |  15  ----------------------------<  131<H>  3.7   |  23  |  1.02    Ca    8.9      12 Aug 2019 07:28  Phos  3.4     08-12  Mg     2.0     08-12    TPro  6.7  /  Alb  2.5<L>  /  TBili  1.3<H>  /  DBili  .80<H>  /  AST  67<H>  /  ALT  234<H>  /  AlkPhos  170<H>  08-12      Urinalysis Basic - ( 12 Aug 2019 21:03 )    Color: Yellow / Appearance: Clear / S.010 / pH: x  Gluc: x / Ketone: Negative  / Bili: Negative / Urobili: Negative mg/dL   Blood: x / Protein: 30 mg/dL / Nitrite: Negative   Leuk Esterase: Small / RBC: 3-5 /HPF / WBC 3-5   Sq Epi: x / Non Sq Epi: Few / Bacteria: x    < from: US Duplex Venous Lower Ext Complete, Bilateral (19 @ 13:59) >  IMPRESSION:   No evidence of deep venous thrombosis in either lower extremity.  < end of copied text >      Assessment: 86F with PMH OA, HTN, HLD, DM, CHF, gastric ulcer admitted with sepsis, elevated LFTs with suspicion for gallstone pancreatitis vs acute cholecystitis  Cholelithiasis seen on CT and MRCP; no choledocholithiasis. +pancreatic body pseudocyst vs IPMN  Elevated Ca 19-9. HIDA negative. IR consulted, no need for perc joaquin. UTI. No DVTs.    Plan:  -c/w zosyn  -c/w heparin vte ppx  -GI and medical input appreciated. Cardio sign off noted  -analgesia for arthritic knee pain, continue PT  -F/u AM labs and CXR

## 2019-08-13 NOTE — PROGRESS NOTE ADULT - SUBJECTIVE AND OBJECTIVE BOX
Pt stable - no c/o abd pain  on zosyn      MEDICATIONS  (STANDING):  amLODIPine   Tablet 10 milliGRAM(s) Oral daily  atorvastatin 20 milliGRAM(s) Oral at bedtime  dextrose 5% + sodium chloride 0.9% with potassium chloride 20 mEq/L 1000 milliLiter(s) (75 mL/Hr) IV Continuous <Continuous>  dextrose 5%. 1000 milliLiter(s) (50 mL/Hr) IV Continuous <Continuous>  dextrose 50% Injectable 12.5 Gram(s) IV Push once  dextrose 50% Injectable 25 Gram(s) IV Push once  dextrose 50% Injectable 25 Gram(s) IV Push once  enalapril 20 milliGRAM(s) Oral two times a day  heparin  Injectable 5000 Unit(s) SubCutaneous every 12 hours  insulin lispro (HumaLOG) corrective regimen sliding scale   SubCutaneous three times a day before meals  insulin lispro (HumaLOG) corrective regimen sliding scale   SubCutaneous at bedtime  metoprolol tartrate 100 milliGRAM(s) Oral two times a day  pantoprazole    Tablet 40 milliGRAM(s) Oral before breakfast  piperacillin/tazobactam IVPB.. 3.375 Gram(s) IV Intermittent every 8 hours  sodium phosphate IVPB 15 milliMole(s) IV Intermittent once    MEDICATIONS  (PRN):  acetaminophen   Tablet .. 650 milliGRAM(s) Oral every 6 hours PRN Temp greater or equal to 38C (100.4F), Mild Pain (1 - 3)  dextrose 40% Gel 15 Gram(s) Oral once PRN Blood Glucose LESS THAN 70 milliGRAM(s)/deciliter  glucagon  Injectable 1 milliGRAM(s) IntraMuscular once PRN Glucose LESS THAN 70 milligrams/deciliter  loratadine 10 milliGRAM(s) Oral daily PRN allergies  ondansetron Injectable 4 milliGRAM(s) IV Push every 6 hours PRN Nausea      Allergies    dust (Sneezing)  No Known Drug Allergies  POLLEN (Rhinorrhea; Sneezing)    Intolerances        Vital Signs Last 24 Hrs  T(C): 37 (13 Aug 2019 11:03), Max: 37.9 (12 Aug 2019 18:43)  T(F): 98.6 (13 Aug 2019 11:03), Max: 100.3 (12 Aug 2019 18:43)  HR: 75 (13 Aug 2019 11:03) (75 - 103)  BP: 115/58 (13 Aug 2019 11:03) (115/58 - 151/76)  BP(mean): 77 (13 Aug 2019 11:03) (77 - 77)  RR: 18 (13 Aug 2019 11:03) (15 - 18)  SpO2: 99% (13 Aug 2019 11:03) (94% - 100%)    PHYSICAL EXAM:  General: NAD.  CVS: S1, S2  Chest: air entry bilaterally present  Abd: BS present, soft, non-tender      LABS:                        9.7    7.71  )-----------( 293      ( 13 Aug 2019 07:45 )             28.3     08-13    134<L>  |  104  |  8   ----------------------------<  149<H>  3.8   |  24  |  0.58    Ca    8.5      13 Aug 2019 07:45  Phos  2.1     08-13  Mg     2.5     08-13    TPro  6.6  /  Alb  2.2<L>  /  TBili  1.0  /  DBili  .40<H>  /  AST  43<H>  /  ALT  140<H>  /  AlkPhos  181<H>  08-13      diet as tolerated  discharge planning

## 2019-08-14 PROCEDURE — 99233 SBSQ HOSP IP/OBS HIGH 50: CPT

## 2019-08-14 RX ADMIN — PANTOPRAZOLE SODIUM 40 MILLIGRAM(S): 20 TABLET, DELAYED RELEASE ORAL at 08:17

## 2019-08-14 RX ADMIN — AMLODIPINE BESYLATE 10 MILLIGRAM(S): 2.5 TABLET ORAL at 05:46

## 2019-08-14 RX ADMIN — Medication 100 MILLIGRAM(S): at 17:49

## 2019-08-14 RX ADMIN — PIPERACILLIN AND TAZOBACTAM 25 GRAM(S): 4; .5 INJECTION, POWDER, LYOPHILIZED, FOR SOLUTION INTRAVENOUS at 13:31

## 2019-08-14 RX ADMIN — ATORVASTATIN CALCIUM 20 MILLIGRAM(S): 80 TABLET, FILM COATED ORAL at 21:53

## 2019-08-14 RX ADMIN — PIPERACILLIN AND TAZOBACTAM 25 GRAM(S): 4; .5 INJECTION, POWDER, LYOPHILIZED, FOR SOLUTION INTRAVENOUS at 21:53

## 2019-08-14 RX ADMIN — PIPERACILLIN AND TAZOBACTAM 25 GRAM(S): 4; .5 INJECTION, POWDER, LYOPHILIZED, FOR SOLUTION INTRAVENOUS at 05:46

## 2019-08-14 RX ADMIN — Medication 20 MILLIGRAM(S): at 05:46

## 2019-08-14 RX ADMIN — HEPARIN SODIUM 5000 UNIT(S): 5000 INJECTION INTRAVENOUS; SUBCUTANEOUS at 05:46

## 2019-08-14 RX ADMIN — HEPARIN SODIUM 5000 UNIT(S): 5000 INJECTION INTRAVENOUS; SUBCUTANEOUS at 17:49

## 2019-08-14 RX ADMIN — Medication 1: at 16:56

## 2019-08-14 RX ADMIN — Medication 100 MILLIGRAM(S): at 05:46

## 2019-08-14 RX ADMIN — Medication 20 MILLIGRAM(S): at 17:49

## 2019-08-14 NOTE — PROGRESS NOTE ADULT - SUBJECTIVE AND OBJECTIVE BOX
Patient seen and examined at bedside in no distress.  No complaints offered.  Tolerating regular diet. Denies abdominal pain, nausea, vomiting.  Denies fever, chills, chest pain, sob.    Vital Signs Last 24 Hrs  T(F): 99.7 (08-14-19 @ 04:51), Max: 99.9 (08-13-19 @ 23:43)  HR: 93 (08-14-19 @ 04:51)  BP: 130/73 (08-14-19 @ 04:51)  RR: 18 (08-14-19 @ 04:51)  SpO2: 97% (08-14-19 @ 04:51)    GENERAL: Alert, NAD  CHEST/LUNG: Clear to auscultation bilaterally, respirations nonlabored  HEART: S1S2, RRR  ABDOMEN: + Bowel sounds, soft, NTND  EXTREMITIES: No pedal edema b/l       A/P: 86F with PMH OA, HTN, HLD, DM, CHF, gastric ulcer admitted with elevated LFTs with suspicion for gallstone pancreatitis vs acute cholecystitis  Cholelithiasis seen on CT and MRCP; no choledocholithiasis. +pancreatic body pseudocyst vs IPMN  Elevated Ca 19-9. HIDA negative. Bacteruria.  - awaiting discharge to subacute rehab  - augmentin on discharge  - outpatient GI follow up   - will d/w surgical attending

## 2019-08-14 NOTE — PROGRESS NOTE ADULT - ASSESSMENT
87 y/o female with history of GI  ulcer presents with right upper quadrant abdominal pain, found to have gall stone.      Cholelithiases  - No choledocholithiasis in MRCP  - HIDA is neg for acute cholecystitis  - No Sx planned, outpt lap joaquin  - Can stop Abx, 7 days of zosyn today.  - US with only few bacteria but negative culture. no indication to treat.       Chronic diastolic congestive heart failure:  - Stable  - Continue home med      DM:  - Insulin SS      Hyponatremia:  - Improved      HTN:  - Continue current meds, BP acceptable

## 2019-08-14 NOTE — PROGRESS NOTE ADULT - SUBJECTIVE AND OBJECTIVE BOX
Pt stable - no complaints  feeling better      MEDICATIONS  (STANDING):  amLODIPine   Tablet 10 milliGRAM(s) Oral daily  atorvastatin 20 milliGRAM(s) Oral at bedtime  dextrose 5%. 1000 milliLiter(s) (50 mL/Hr) IV Continuous <Continuous>  dextrose 50% Injectable 12.5 Gram(s) IV Push once  dextrose 50% Injectable 25 Gram(s) IV Push once  dextrose 50% Injectable 25 Gram(s) IV Push once  enalapril 20 milliGRAM(s) Oral two times a day  heparin  Injectable 5000 Unit(s) SubCutaneous every 12 hours  insulin lispro (HumaLOG) corrective regimen sliding scale   SubCutaneous three times a day before meals  insulin lispro (HumaLOG) corrective regimen sliding scale   SubCutaneous at bedtime  metoprolol tartrate 100 milliGRAM(s) Oral two times a day  pantoprazole    Tablet 40 milliGRAM(s) Oral before breakfast  piperacillin/tazobactam IVPB.. 3.375 Gram(s) IV Intermittent every 8 hours    MEDICATIONS  (PRN):  acetaminophen   Tablet .. 650 milliGRAM(s) Oral every 6 hours PRN Temp greater or equal to 38C (100.4F), Mild Pain (1 - 3)  dextrose 40% Gel 15 Gram(s) Oral once PRN Blood Glucose LESS THAN 70 milliGRAM(s)/deciliter  glucagon  Injectable 1 milliGRAM(s) IntraMuscular once PRN Glucose LESS THAN 70 milligrams/deciliter  loratadine 10 milliGRAM(s) Oral daily PRN allergies  ondansetron Injectable 4 milliGRAM(s) IV Push every 6 hours PRN Nausea      Allergies    dust (Sneezing)  No Known Drug Allergies  POLLEN (Rhinorrhea; Sneezing)    Intolerances        Vital Signs Last 24 Hrs  T(C): 37.6 (14 Aug 2019 04:51), Max: 37.7 (13 Aug 2019 23:43)  T(F): 99.7 (14 Aug 2019 04:51), Max: 99.9 (13 Aug 2019 23:43)  HR: 93 (14 Aug 2019 04:51) (75 - 93)  BP: 130/73 (14 Aug 2019 04:51) (115/58 - 140/72)  BP(mean): 77 (13 Aug 2019 11:03) (77 - 77)  RR: 18 (14 Aug 2019 04:51) (18 - 18)  SpO2: 97% (14 Aug 2019 04:51) (97% - 100%)    PHYSICAL EXAM:  General: NAD.  CVS: S1, S2  Chest: air entry bilaterally present  Abd: BS present, soft, non-tender      LABS:                        9.7    7.71  )-----------( 293      ( 13 Aug 2019 07:45 )             28.3     08-13    134<L>  |  104  |  8   ----------------------------<  149<H>  3.8   |  24  |  0.58    Ca    8.5      13 Aug 2019 07:45  Phos  2.1     08-13  Mg     2.5     08-13    TPro  6.6  /  Alb  2.2<L>  /  TBili  1.0  /  DBili  .40<H>  /  AST  43<H>  /  ALT  140<H>  /  AlkPhos  181<H>  08-13    Lipase, Serum: 116 U/L (08.12.19 @ 07:28)  < from: MR MRCP No Cont (08.08.19 @ 18:18) >  1 cm cystic pancreatic body lesion, possibly chronic pseudocyst or   intraductal papillary mucinous neoplasm.        tolerating diet  Pt with ?IPMN pancreas -  suggest f/u as outpatient - likely conservative management due to pts advanced age  consider repeat MR in 3-4 months to see if any change in size

## 2019-08-14 NOTE — PROGRESS NOTE ADULT - SUBJECTIVE AND OBJECTIVE BOX
Patient is a 86y old  Female who presents with a chief complaint of Abdominal pain (14 Aug 2019 10:15)      INTERVAL HPI/OVERNIGHT EVENTS:  Pt was seen and examined, no acute events.    MEDICATIONS  (STANDING):  amLODIPine   Tablet 10 milliGRAM(s) Oral daily  atorvastatin 20 milliGRAM(s) Oral at bedtime  dextrose 5%. 1000 milliLiter(s) (50 mL/Hr) IV Continuous <Continuous>  dextrose 50% Injectable 12.5 Gram(s) IV Push once  dextrose 50% Injectable 25 Gram(s) IV Push once  dextrose 50% Injectable 25 Gram(s) IV Push once  enalapril 20 milliGRAM(s) Oral two times a day  heparin  Injectable 5000 Unit(s) SubCutaneous every 12 hours  insulin lispro (HumaLOG) corrective regimen sliding scale   SubCutaneous three times a day before meals  insulin lispro (HumaLOG) corrective regimen sliding scale   SubCutaneous at bedtime  metoprolol tartrate 100 milliGRAM(s) Oral two times a day  pantoprazole    Tablet 40 milliGRAM(s) Oral before breakfast  piperacillin/tazobactam IVPB.. 3.375 Gram(s) IV Intermittent every 8 hours    MEDICATIONS  (PRN):  acetaminophen   Tablet .. 650 milliGRAM(s) Oral every 6 hours PRN Temp greater or equal to 38C (100.4F), Mild Pain (1 - 3)  dextrose 40% Gel 15 Gram(s) Oral once PRN Blood Glucose LESS THAN 70 milliGRAM(s)/deciliter  glucagon  Injectable 1 milliGRAM(s) IntraMuscular once PRN Glucose LESS THAN 70 milligrams/deciliter  loratadine 10 milliGRAM(s) Oral daily PRN allergies  ondansetron Injectable 4 milliGRAM(s) IV Push every 6 hours PRN Nausea      Allergies  dust (Sneezing)  No Known Drug Allergies  POLLEN (Rhinorrhea; Sneezing)      Vital Signs Last 24 Hrs  T(C): 37.4 (14 Aug 2019 12:15), Max: 37.7 (13 Aug 2019 23:43)  T(F): 99.3 (14 Aug 2019 12:15), Max: 99.9 (13 Aug 2019 23:43)  HR: 80 (14 Aug 2019 12:15) (80 - 93)  BP: 146/68 (14 Aug 2019 12:15) (130/73 - 146/68)  BP(mean): --  RR: 18 (14 Aug 2019 12:15) (18 - 18)  SpO2: 96% (14 Aug 2019 12:15) (96% - 100%)    PHYSICAL EXAM:  GENERAL: NAD  HEAD:  Atraumatic  EYES: PERRLA  NERVOUS SYSTEM:  Awake, alert  CHEST/LUNG: Clear  HEART: RRR  ABDOMEN: Soft, non tender  EXTREMITIES:  no edema    LABS:                        9.7    7.71  )-----------( 293      ( 13 Aug 2019 07:45 )             28.3     -    134<L>  |  104  |  8   ----------------------------<  149<H>  3.8   |  24  |  0.58    Ca    8.5      13 Aug 2019 07:45  Phos  2.1       Mg     2.5         TPro  6.6  /  Alb  2.2<L>  /  TBili  1.0  /  DBili  .40<H>  /  AST  43<H>  /  ALT  140<H>  /  AlkPhos  181<H>        Urinalysis Basic - ( 12 Aug 2019 21:03 )    Color: Yellow / Appearance: Clear / S.010 / pH: x  Gluc: x / Ketone: Negative  / Bili: Negative / Urobili: Negative mg/dL   Blood: x / Protein: 30 mg/dL / Nitrite: Negative   Leuk Esterase: Small / RBC: 3-5 /HPF / WBC 3-5   Sq Epi: x / Non Sq Epi: Few / Bacteria: x      CAPILLARY BLOOD GLUCOSE      POCT Blood Glucose.: 139 mg/dL (14 Aug 2019 10:42)  POCT Blood Glucose.: 134 mg/dL (14 Aug 2019 07:22)  POCT Blood Glucose.: 133 mg/dL (13 Aug 2019 21:48)  POCT Blood Glucose.: 204 mg/dL (13 Aug 2019 16:03)      RADIOLOGY & ADDITIONAL TESTS:    Imaging Personally Reviewed:  [ ] YES  [ ] NO    Consultant(s) Notes Reviewed:  [ ] YES  [ ] NO    Care Discussed with Consultants/Other Providers [ ] YES  [ ] NO

## 2019-08-15 LAB
ALBUMIN SERPL ELPH-MCNC: 2.4 G/DL — LOW (ref 3.3–5)
ALP SERPL-CCNC: 294 U/L — HIGH (ref 40–120)
ALT FLD-CCNC: 114 U/L — HIGH (ref 12–78)
ANION GAP SERPL CALC-SCNC: 10 MMOL/L — SIGNIFICANT CHANGE UP (ref 5–17)
APPEARANCE UR: ABNORMAL
AST SERPL-CCNC: 46 U/L — HIGH (ref 15–37)
BACTERIA # UR AUTO: ABNORMAL
BASOPHILS # BLD AUTO: 0.04 K/UL — SIGNIFICANT CHANGE UP (ref 0–0.2)
BASOPHILS NFR BLD AUTO: 0.4 % — SIGNIFICANT CHANGE UP (ref 0–2)
BILIRUB DIRECT SERPL-MCNC: 0.36 MG/DL — HIGH (ref 0.05–0.2)
BILIRUB INDIRECT FLD-MCNC: 0.5 MG/DL — SIGNIFICANT CHANGE UP (ref 0.2–1)
BILIRUB SERPL-MCNC: 0.9 MG/DL — SIGNIFICANT CHANGE UP (ref 0.2–1.2)
BILIRUB UR-MCNC: NEGATIVE — SIGNIFICANT CHANGE UP
BUN SERPL-MCNC: 13 MG/DL — SIGNIFICANT CHANGE UP (ref 7–23)
CALCIUM SERPL-MCNC: 9.2 MG/DL — SIGNIFICANT CHANGE UP (ref 8.5–10.1)
CHLORIDE SERPL-SCNC: 95 MMOL/L — LOW (ref 96–108)
CO2 SERPL-SCNC: 25 MMOL/L — SIGNIFICANT CHANGE UP (ref 22–31)
COLOR SPEC: YELLOW — SIGNIFICANT CHANGE UP
CREAT SERPL-MCNC: 0.82 MG/DL — SIGNIFICANT CHANGE UP (ref 0.5–1.3)
DIFF PNL FLD: ABNORMAL
EOSINOPHIL # BLD AUTO: 0.1 K/UL — SIGNIFICANT CHANGE UP (ref 0–0.5)
EOSINOPHIL NFR BLD AUTO: 1 % — SIGNIFICANT CHANGE UP (ref 0–6)
EPI CELLS # UR: SIGNIFICANT CHANGE UP
GLUCOSE SERPL-MCNC: 151 MG/DL — HIGH (ref 70–99)
GLUCOSE UR QL: NEGATIVE MG/DL — SIGNIFICANT CHANGE UP
HCT VFR BLD CALC: 30.7 % — LOW (ref 34.5–45)
HGB BLD-MCNC: 10.3 G/DL — LOW (ref 11.5–15.5)
HYALINE CASTS # UR AUTO: ABNORMAL /LPF
IMM GRANULOCYTES NFR BLD AUTO: 0.6 % — SIGNIFICANT CHANGE UP (ref 0–1.5)
KETONES UR-MCNC: NEGATIVE — SIGNIFICANT CHANGE UP
LEUKOCYTE ESTERASE UR-ACNC: ABNORMAL
LIDOCAIN IGE QN: 376 U/L — SIGNIFICANT CHANGE UP (ref 73–393)
LYMPHOCYTES # BLD AUTO: 1.37 K/UL — SIGNIFICANT CHANGE UP (ref 1–3.3)
LYMPHOCYTES # BLD AUTO: 14.3 % — SIGNIFICANT CHANGE UP (ref 13–44)
MCHC RBC-ENTMCNC: 27 PG — SIGNIFICANT CHANGE UP (ref 27–34)
MCHC RBC-ENTMCNC: 33.6 GM/DL — SIGNIFICANT CHANGE UP (ref 32–36)
MCV RBC AUTO: 80.4 FL — SIGNIFICANT CHANGE UP (ref 80–100)
MONOCYTES # BLD AUTO: 0.81 K/UL — SIGNIFICANT CHANGE UP (ref 0–0.9)
MONOCYTES NFR BLD AUTO: 8.5 % — SIGNIFICANT CHANGE UP (ref 2–14)
NEUTROPHILS # BLD AUTO: 7.18 K/UL — SIGNIFICANT CHANGE UP (ref 1.8–7.4)
NEUTROPHILS NFR BLD AUTO: 75.2 % — SIGNIFICANT CHANGE UP (ref 43–77)
NITRITE UR-MCNC: NEGATIVE — SIGNIFICANT CHANGE UP
NRBC # BLD: 0 /100 WBCS — SIGNIFICANT CHANGE UP (ref 0–0)
PH UR: 8 — SIGNIFICANT CHANGE UP (ref 5–8)
PLATELET # BLD AUTO: 486 K/UL — HIGH (ref 150–400)
POTASSIUM SERPL-MCNC: 4.2 MMOL/L — SIGNIFICANT CHANGE UP (ref 3.5–5.3)
POTASSIUM SERPL-SCNC: 4.2 MMOL/L — SIGNIFICANT CHANGE UP (ref 3.5–5.3)
PROT SERPL-MCNC: 8.3 GM/DL — SIGNIFICANT CHANGE UP (ref 6–8.3)
PROT UR-MCNC: 15 MG/DL
RBC # BLD: 3.82 M/UL — SIGNIFICANT CHANGE UP (ref 3.8–5.2)
RBC # FLD: 14.5 % — SIGNIFICANT CHANGE UP (ref 10.3–14.5)
RBC CASTS # UR COMP ASSIST: ABNORMAL /HPF (ref 0–4)
SODIUM SERPL-SCNC: 130 MMOL/L — LOW (ref 135–145)
SP GR SPEC: 1.01 — SIGNIFICANT CHANGE UP (ref 1.01–1.02)
UROBILINOGEN FLD QL: NEGATIVE MG/DL — SIGNIFICANT CHANGE UP
WBC # BLD: 9.56 K/UL — SIGNIFICANT CHANGE UP (ref 3.8–10.5)
WBC # FLD AUTO: 9.56 K/UL — SIGNIFICANT CHANGE UP (ref 3.8–10.5)
WBC UR QL: SIGNIFICANT CHANGE UP

## 2019-08-15 PROCEDURE — 99232 SBSQ HOSP IP/OBS MODERATE 35: CPT

## 2019-08-15 PROCEDURE — 71045 X-RAY EXAM CHEST 1 VIEW: CPT | Mod: 26

## 2019-08-15 RX ORDER — SODIUM CHLORIDE 9 MG/ML
1000 INJECTION INTRAMUSCULAR; INTRAVENOUS; SUBCUTANEOUS ONCE
Refills: 0 | Status: COMPLETED | OUTPATIENT
Start: 2019-08-15 | End: 2019-08-15

## 2019-08-15 RX ORDER — SODIUM CHLORIDE 9 MG/ML
1000 INJECTION INTRAMUSCULAR; INTRAVENOUS; SUBCUTANEOUS
Refills: 0 | Status: DISCONTINUED | OUTPATIENT
Start: 2019-08-15 | End: 2019-08-17

## 2019-08-15 RX ADMIN — Medication 20 MILLIGRAM(S): at 18:02

## 2019-08-15 RX ADMIN — Medication 1: at 16:22

## 2019-08-15 RX ADMIN — Medication 650 MILLIGRAM(S): at 18:08

## 2019-08-15 RX ADMIN — HEPARIN SODIUM 5000 UNIT(S): 5000 INJECTION INTRAVENOUS; SUBCUTANEOUS at 05:25

## 2019-08-15 RX ADMIN — PIPERACILLIN AND TAZOBACTAM 25 GRAM(S): 4; .5 INJECTION, POWDER, LYOPHILIZED, FOR SOLUTION INTRAVENOUS at 14:08

## 2019-08-15 RX ADMIN — PIPERACILLIN AND TAZOBACTAM 25 GRAM(S): 4; .5 INJECTION, POWDER, LYOPHILIZED, FOR SOLUTION INTRAVENOUS at 05:24

## 2019-08-15 RX ADMIN — Medication 100 MILLIGRAM(S): at 05:25

## 2019-08-15 RX ADMIN — AMLODIPINE BESYLATE 10 MILLIGRAM(S): 2.5 TABLET ORAL at 05:25

## 2019-08-15 RX ADMIN — Medication 20 MILLIGRAM(S): at 05:25

## 2019-08-15 RX ADMIN — PIPERACILLIN AND TAZOBACTAM 25 GRAM(S): 4; .5 INJECTION, POWDER, LYOPHILIZED, FOR SOLUTION INTRAVENOUS at 21:35

## 2019-08-15 RX ADMIN — HEPARIN SODIUM 5000 UNIT(S): 5000 INJECTION INTRAVENOUS; SUBCUTANEOUS at 18:04

## 2019-08-15 RX ADMIN — PANTOPRAZOLE SODIUM 40 MILLIGRAM(S): 20 TABLET, DELAYED RELEASE ORAL at 11:24

## 2019-08-15 RX ADMIN — SODIUM CHLORIDE 30 MILLILITER(S): 9 INJECTION INTRAMUSCULAR; INTRAVENOUS; SUBCUTANEOUS at 22:56

## 2019-08-15 RX ADMIN — SODIUM CHLORIDE 1000 MILLILITER(S): 9 INJECTION INTRAMUSCULAR; INTRAVENOUS; SUBCUTANEOUS at 17:58

## 2019-08-15 RX ADMIN — Medication 100 MILLIGRAM(S): at 18:05

## 2019-08-15 RX ADMIN — ATORVASTATIN CALCIUM 20 MILLIGRAM(S): 80 TABLET, FILM COATED ORAL at 21:35

## 2019-08-15 NOTE — CONSULT NOTE ADULT - ASSESSMENT
85 y/o female with history of GI  ulcer presents with right upper quadrant abdominal pain, found to have gall stone.      Cholelithiases  - No choledocholithiasis in MRCP  - HIDA is neg for acute cholecystitis  - No Sx planned, outpt lap joaquin  - Can stop Abx on discharge, day 8 of zosyn   - US with only few bacteria but negative culture. no indication to treat.       Chronic diastolic congestive heart failure:  - Stable  - Continue home med      DM:  - Insulin SS    Hyponatremia:  - Improved    HTN:  - Continue current meds, BP acceptable 87 y/o female with history of GI  ulcer presents with right upper quadrant abdominal pain, found to have gall stone.      Cholelithiases  - No choledocholithiasis in MRCP  - HIDA is neg for acute cholecystitis  - No Sx planned, outpt lap joaquin  - Can stop Abx on discharge, day 8 of zosyn   - US with only few bacteria but negative culture. no indication to treat.       Chronic diastolic congestive heart failure:  - EF 60-65%, grade II diastolic   - Continue home med      DM:  - Insulin SS    Hyponatremia:  - Improved    HTN:  - Continue current meds, BP acceptable

## 2019-08-15 NOTE — PROGRESS NOTE ADULT - SUBJECTIVE AND OBJECTIVE BOX
Patient seen and examined at bedside in no distress.  Tolerating breakfast.     Vital Signs Last 24 Hrs  T(F): 98.7 (08-15-19 @ 04:51), Max: 99.9 (08-14-19 @ 17:06)  HR: 73 (08-15-19 @ 04:51)  BP: 134/69 (08-15-19 @ 04:51)  RR: 18 (08-15-19 @ 04:51)  SpO2: 95% (08-15-19 @ 04:51)    GENERAL: Alert, oriented, NAD  CHEST/LUNG: Respirations nonlabored  HEART: S1S2, RRR  ABDOMEN: Soft, NTND  EXTREMITIES: No calf tenderness, No edema      A/P: 86F with PMH OA, HTN, HLD, DM, CHF, gastric ulcer admitted with elevated LFTs with suspicion for gallstone pancreatitis vs acute cholecystitis  Cholelithiasis seen on CT and MRCP; no choledocholithiasis. +pancreatic body pseudocyst vs IPMN  Elevated Ca 19-9. HIDA negative. Bacteruria.  - awaiting discharge to subacute rehab  - outpatient GI follow up   - discussed with surgical attending

## 2019-08-15 NOTE — CHART NOTE - NSCHARTNOTEFT_GEN_A_CORE
Patient seen for length of stay nutrition risk rescreening. Patient has been screened for and presents negative for    -Pressure ulcers stage 2 or greater  -Enteral or Parenteral Nutrition  -BMI <18  -[id number] >8  -Chewing/Swallowing Diffculty  -Decreased appetite  -Unintentional Weight Loss  -Inadequate diet (i.e. NPO/Clear Liquids)    No intervention required at this time. RD remains available.

## 2019-08-15 NOTE — CONSULT NOTE ADULT - SUBJECTIVE AND OBJECTIVE BOX
Patient is a 86y old  Female who presents with a chief complaint of Abdominal pain (15 Aug 2019 10:25)      INTERVAL HPI/ OVERNIGHT EVENTS: Pt was seen and examined at bedside today, No significant overnight events, pt denies any abdominal pain, admits to weakness.      MEDICATIONS  (STANDING):  amLODIPine   Tablet 10 milliGRAM(s) Oral daily  atorvastatin 20 milliGRAM(s) Oral at bedtime  dextrose 5%. 1000 milliLiter(s) (50 mL/Hr) IV Continuous <Continuous>  dextrose 50% Injectable 12.5 Gram(s) IV Push once  dextrose 50% Injectable 25 Gram(s) IV Push once  dextrose 50% Injectable 25 Gram(s) IV Push once  enalapril 20 milliGRAM(s) Oral two times a day  heparin  Injectable 5000 Unit(s) SubCutaneous every 12 hours  insulin lispro (HumaLOG) corrective regimen sliding scale   SubCutaneous three times a day before meals  insulin lispro (HumaLOG) corrective regimen sliding scale   SubCutaneous at bedtime  metoprolol tartrate 100 milliGRAM(s) Oral two times a day  pantoprazole    Tablet 40 milliGRAM(s) Oral before breakfast  piperacillin/tazobactam IVPB.. 3.375 Gram(s) IV Intermittent every 8 hours    MEDICATIONS  (PRN):  acetaminophen   Tablet .. 650 milliGRAM(s) Oral every 6 hours PRN Temp greater or equal to 38C (100.4F), Mild Pain (1 - 3)  dextrose 40% Gel 15 Gram(s) Oral once PRN Blood Glucose LESS THAN 70 milliGRAM(s)/deciliter  glucagon  Injectable 1 milliGRAM(s) IntraMuscular once PRN Glucose LESS THAN 70 milligrams/deciliter  loratadine 10 milliGRAM(s) Oral daily PRN allergies  ondansetron Injectable 4 milliGRAM(s) IV Push every 6 hours PRN Nausea      Allergies    dust (Sneezing)  No Known Drug Allergies  POLLEN (Rhinorrhea; Sneezing)    Intolerances        REVIEW OF SYSTEMS:    Unable to examine due to [ ] Encephalopathy [ ] Advanced Dementia [ ] Expressive Aphasia [ ] Non-verbal patient    CONSTITUTIONAL: No fever, positive generalized weakness/Fatigue, No weight loss  EYES: No eye pain, visual disturbances, or discharge  ENMT:  No difficulty hearing, tinnitus, vertigo; No sinus or throat pain  NECK: No pain or stiffness  RESPIRATORY: No shortness of breath,  cough, wheezing, sputum or hemoptysis   CARDIOVASCULAR: No chest pain, palpitations, or leg swelling  GASTROINTESTINAL: No abdominal pain. No nausea, vomiting, diarrhea or constipation. No melena or hematochezia.  GENITOURINARY: No dysuria, frequency, hematuria, or incontinence  NEUROLOGICAL: No headaches, Dizziness, memory loss, loss of strength, numbness, or tremors  SKIN: No itching, burning, rashes, or lesions   MUSCULOSKELETAL: No joint pain or swelling; No muscle, back, or extremity pain  PSYCHIATRIC: No depression, anxiety, mood swings, or difficulty sleeping  HEME/LYMPH: No easy bruising, or bleeding gums      Vital Signs Last 24 Hrs  T(C): 37.1 (15 Aug 2019 04:51), Max: 37.7 (14 Aug 2019 17:06)  T(F): 98.7 (15 Aug 2019 04:51), Max: 99.9 (14 Aug 2019 17:06)  HR: 91 (15 Aug 2019 13:45) (70 - 93)  BP: 146/75 (15 Aug 2019 13:45) (124/71 - 146/75)  BP(mean): 104 (15 Aug 2019 13:45) (104 - 104)  RR: 18 (15 Aug 2019 04:51) (18 - 20)  SpO2: 98% (15 Aug 2019 13:45) (95% - 98%)    PHYSICAL EXAM:  GENERAL: NAD, well-developed, well-groomed  HEAD:  Atraumatic, Normocephalic  EYES: conjunctiva and sclera clear  ENMT: Moist mucous membranes  NECK: Supple, No JVD, Normal thyroid  CHEST/LUNG: Clear to Auscultation bilaterally; No rales, rhonchi, wheezing, or rubs  HEART: Regular rate and rhythm; No murmurs, rubs, or gallops  ABDOMEN: Soft, Nontender, Nondistended; Bowel sounds present  EXTREMITIES:  2+ Peripheral Pulses, No clubbing, cyanosis, or edema  SKIN: No rashes or lesions  NERVOUS SYSTEM:  Alert & Oriented X3, Good concentration; Motor Strength 5/5 B/L upper and lower extremities    LABS:              CAPILLARY BLOOD GLUCOSE      POCT Blood Glucose.: 118 mg/dL (15 Aug 2019 10:34)  POCT Blood Glucose.: 131 mg/dL (15 Aug 2019 07:21)  POCT Blood Glucose.: 140 mg/dL (14 Aug 2019 21:51)  POCT Blood Glucose.: 156 mg/dL (14 Aug 2019 15:25)          RADIOLOGY & ADDITIONAL TESTS:          Imaging Personally Reviewed:  [ ] YES  [ ] NO    Consultant(s) Notes Reviewed:  [ ] YES  [ ] NO    Care Discussed with Consultants/Other Providers [x ] YES  [ ] NO

## 2019-08-15 NOTE — PROGRESS NOTE ADULT - SUBJECTIVE AND OBJECTIVE BOX
Pt stable - tolerating diet  no c/o abd pain      MEDICATIONS  (STANDING):  amLODIPine   Tablet 10 milliGRAM(s) Oral daily  atorvastatin 20 milliGRAM(s) Oral at bedtime  dextrose 5%. 1000 milliLiter(s) (50 mL/Hr) IV Continuous <Continuous>  dextrose 50% Injectable 12.5 Gram(s) IV Push once  dextrose 50% Injectable 25 Gram(s) IV Push once  dextrose 50% Injectable 25 Gram(s) IV Push once  enalapril 20 milliGRAM(s) Oral two times a day  heparin  Injectable 5000 Unit(s) SubCutaneous every 12 hours  insulin lispro (HumaLOG) corrective regimen sliding scale   SubCutaneous three times a day before meals  insulin lispro (HumaLOG) corrective regimen sliding scale   SubCutaneous at bedtime  metoprolol tartrate 100 milliGRAM(s) Oral two times a day  pantoprazole    Tablet 40 milliGRAM(s) Oral before breakfast  piperacillin/tazobactam IVPB.. 3.375 Gram(s) IV Intermittent every 8 hours    MEDICATIONS  (PRN):  acetaminophen   Tablet .. 650 milliGRAM(s) Oral every 6 hours PRN Temp greater or equal to 38C (100.4F), Mild Pain (1 - 3)  dextrose 40% Gel 15 Gram(s) Oral once PRN Blood Glucose LESS THAN 70 milliGRAM(s)/deciliter  glucagon  Injectable 1 milliGRAM(s) IntraMuscular once PRN Glucose LESS THAN 70 milligrams/deciliter  loratadine 10 milliGRAM(s) Oral daily PRN allergies  ondansetron Injectable 4 milliGRAM(s) IV Push every 6 hours PRN Nausea      Allergies    dust (Sneezing)  No Known Drug Allergies  POLLEN (Rhinorrhea; Sneezing)    Intolerances        Vital Signs Last 24 Hrs  T(C): 37.1 (15 Aug 2019 04:51), Max: 37.7 (14 Aug 2019 17:06)  T(F): 98.7 (15 Aug 2019 04:51), Max: 99.9 (14 Aug 2019 17:06)  HR: 91 (15 Aug 2019 13:45) (70 - 93)  BP: 146/75 (15 Aug 2019 13:45) (124/71 - 146/75)  BP(mean): 104 (15 Aug 2019 13:45) (104 - 104)  RR: 18 (15 Aug 2019 04:51) (18 - 20)  SpO2: 98% (15 Aug 2019 13:45) (95% - 98%)    PHYSICAL EXAM:  General: NAD.  CVS: S1, S2  Chest: air entry bilaterally present  Abd: BS present, soft, non-tender      on IV zosyn  stable from GI standpoint  suggest follow up MRCP in 3 months

## 2019-08-16 LAB
ALBUMIN SERPL ELPH-MCNC: 2.1 G/DL — LOW (ref 3.3–5)
ALP SERPL-CCNC: 263 U/L — HIGH (ref 40–120)
ALT FLD-CCNC: 97 U/L — HIGH (ref 12–78)
ANION GAP SERPL CALC-SCNC: 7 MMOL/L — SIGNIFICANT CHANGE UP (ref 5–17)
AST SERPL-CCNC: 51 U/L — HIGH (ref 15–37)
BILIRUB SERPL-MCNC: 1 MG/DL — SIGNIFICANT CHANGE UP (ref 0.2–1.2)
BUN SERPL-MCNC: 10 MG/DL — SIGNIFICANT CHANGE UP (ref 7–23)
CALCIUM SERPL-MCNC: 9 MG/DL — SIGNIFICANT CHANGE UP (ref 8.5–10.1)
CHLORIDE SERPL-SCNC: 104 MMOL/L — SIGNIFICANT CHANGE UP (ref 96–108)
CO2 SERPL-SCNC: 25 MMOL/L — SIGNIFICANT CHANGE UP (ref 22–31)
CREAT SERPL-MCNC: 0.72 MG/DL — SIGNIFICANT CHANGE UP (ref 0.5–1.3)
GLUCOSE BLDC GLUCOMTR-MCNC: 144 MG/DL — HIGH (ref 70–99)
GLUCOSE BLDC GLUCOMTR-MCNC: 170 MG/DL — HIGH (ref 70–99)
GLUCOSE BLDC GLUCOMTR-MCNC: 192 MG/DL — HIGH (ref 70–99)
GLUCOSE SERPL-MCNC: 125 MG/DL — HIGH (ref 70–99)
HCT VFR BLD CALC: 26.7 % — LOW (ref 34.5–45)
HGB BLD-MCNC: 9.2 G/DL — LOW (ref 11.5–15.5)
MAGNESIUM SERPL-MCNC: 2.2 MG/DL — SIGNIFICANT CHANGE UP (ref 1.6–2.6)
MCHC RBC-ENTMCNC: 27.8 PG — SIGNIFICANT CHANGE UP (ref 27–34)
MCHC RBC-ENTMCNC: 34.5 GM/DL — SIGNIFICANT CHANGE UP (ref 32–36)
MCV RBC AUTO: 80.7 FL — SIGNIFICANT CHANGE UP (ref 80–100)
NRBC # BLD: 0 /100 WBCS — SIGNIFICANT CHANGE UP (ref 0–0)
PHOSPHATE SERPL-MCNC: 3.1 MG/DL — SIGNIFICANT CHANGE UP (ref 2.5–4.5)
PLATELET # BLD AUTO: 435 K/UL — HIGH (ref 150–400)
POTASSIUM SERPL-MCNC: 3.8 MMOL/L — SIGNIFICANT CHANGE UP (ref 3.5–5.3)
POTASSIUM SERPL-SCNC: 3.8 MMOL/L — SIGNIFICANT CHANGE UP (ref 3.5–5.3)
PROT SERPL-MCNC: 7.3 GM/DL — SIGNIFICANT CHANGE UP (ref 6–8.3)
RBC # BLD: 3.31 M/UL — LOW (ref 3.8–5.2)
RBC # FLD: 14.7 % — HIGH (ref 10.3–14.5)
SODIUM SERPL-SCNC: 136 MMOL/L — SIGNIFICANT CHANGE UP (ref 135–145)
WBC # BLD: 8.01 K/UL — SIGNIFICANT CHANGE UP (ref 3.8–10.5)
WBC # FLD AUTO: 8.01 K/UL — SIGNIFICANT CHANGE UP (ref 3.8–10.5)

## 2019-08-16 PROCEDURE — 99233 SBSQ HOSP IP/OBS HIGH 50: CPT

## 2019-08-16 RX ADMIN — Medication 20 MILLIGRAM(S): at 17:05

## 2019-08-16 RX ADMIN — AMLODIPINE BESYLATE 10 MILLIGRAM(S): 2.5 TABLET ORAL at 05:35

## 2019-08-16 RX ADMIN — Medication 100 MILLIGRAM(S): at 17:05

## 2019-08-16 RX ADMIN — HEPARIN SODIUM 5000 UNIT(S): 5000 INJECTION INTRAVENOUS; SUBCUTANEOUS at 05:34

## 2019-08-16 RX ADMIN — PIPERACILLIN AND TAZOBACTAM 25 GRAM(S): 4; .5 INJECTION, POWDER, LYOPHILIZED, FOR SOLUTION INTRAVENOUS at 05:34

## 2019-08-16 RX ADMIN — PANTOPRAZOLE SODIUM 40 MILLIGRAM(S): 20 TABLET, DELAYED RELEASE ORAL at 08:14

## 2019-08-16 RX ADMIN — PIPERACILLIN AND TAZOBACTAM 25 GRAM(S): 4; .5 INJECTION, POWDER, LYOPHILIZED, FOR SOLUTION INTRAVENOUS at 22:13

## 2019-08-16 RX ADMIN — Medication 1: at 16:01

## 2019-08-16 RX ADMIN — Medication 100 MILLIGRAM(S): at 05:35

## 2019-08-16 RX ADMIN — ATORVASTATIN CALCIUM 20 MILLIGRAM(S): 80 TABLET, FILM COATED ORAL at 22:13

## 2019-08-16 RX ADMIN — HEPARIN SODIUM 5000 UNIT(S): 5000 INJECTION INTRAVENOUS; SUBCUTANEOUS at 17:05

## 2019-08-16 RX ADMIN — Medication 20 MILLIGRAM(S): at 05:35

## 2019-08-16 RX ADMIN — PIPERACILLIN AND TAZOBACTAM 25 GRAM(S): 4; .5 INJECTION, POWDER, LYOPHILIZED, FOR SOLUTION INTRAVENOUS at 13:02

## 2019-08-16 NOTE — CONSULT NOTE ADULT - SUBJECTIVE AND OBJECTIVE BOX
HPI:  85 y/o female with hx of HTN, DM, HLD, OA with chronic shoulder and hip pain, admitted via the ER on      Allergies :    dust (Sneezing)  No Known Drug Allergies  POLLEN (Rhinorrhea; Sneezing)    Intolerances        Social History:  Tobacco:  Alcohol:  Recent Travel:  Immunizations:        MEDICATIONS  (STANDING):  amLODIPine   Tablet 10 milliGRAM(s) Oral daily  atorvastatin 20 milliGRAM(s) Oral at bedtime  dextrose 5%. 1000 milliLiter(s) (50 mL/Hr) IV Continuous <Continuous>  dextrose 50% Injectable 12.5 Gram(s) IV Push once  dextrose 50% Injectable 25 Gram(s) IV Push once  dextrose 50% Injectable 25 Gram(s) IV Push once  enalapril 20 milliGRAM(s) Oral two times a day  heparin  Injectable 5000 Unit(s) SubCutaneous every 12 hours  insulin lispro (HumaLOG) corrective regimen sliding scale   SubCutaneous three times a day before meals  insulin lispro (HumaLOG) corrective regimen sliding scale   SubCutaneous at bedtime  metoprolol tartrate 100 milliGRAM(s) Oral two times a day  pantoprazole    Tablet 40 milliGRAM(s) Oral before breakfast  piperacillin/tazobactam IVPB.. 3.375 Gram(s) IV Intermittent every 8 hours  sodium chloride 0.9%. 1000 milliLiter(s) (30 mL/Hr) IV Continuous <Continuous>    MEDICATIONS  (PRN):  acetaminophen   Tablet .. 650 milliGRAM(s) Oral every 6 hours PRN Temp greater or equal to 38C (100.4F), Mild Pain (1 - 3)  dextrose 40% Gel 15 Gram(s) Oral once PRN Blood Glucose LESS THAN 70 milliGRAM(s)/deciliter  glucagon  Injectable 1 milliGRAM(s) IntraMuscular once PRN Glucose LESS THAN 70 milligrams/deciliter  loratadine 10 milliGRAM(s) Oral daily PRN allergies  ondansetron Injectable 4 milliGRAM(s) IV Push every 6 hours PRN Nausea      CBC Full  -  ( 16 Aug 2019 08:06 )  WBC Count : 8.01 K/uL  RBC Count : 3.31 M/uL  Hemoglobin : 9.2 g/dL  Hematocrit : 26.7 %  Platelet Count - Automated : 435 K/uL  Mean Cell Volume : 80.7 fl  Mean Cell Hemoglobin : 27.8 pg  Mean Cell Hemoglobin Concentration : 34.5 gm/dL  Auto Neutrophil # : x  Auto Lymphocyte # : x  Auto Monocyte # : x  Auto Eosinophil # : x  Auto Basophil # : x  Auto Neutrophil % : x  Auto Lymphocyte % : x  Auto Monocyte % : x  Auto Eosinophil % : x  Auto Basophil % : x        136  |  104  |  10  ----------------------------<  125<H>  3.8   |  25  |  0.72    Ca    9.0      16 Aug 2019 08:06  Phos  3.1       Mg     2.2         TPro  7.3  /  Alb  2.1<L>  /  TBili  1.0  /  DBili  x   /  AST  51<H>  /  ALT  97<H>  /  AlkPhos  263<H>      LIVER FUNCTIONS - ( 16 Aug 2019 08:06 )  Alb: 2.1 g/dL / Pro: 7.3 gm/dL / ALK PHOS: 263 U/L / ALT: 97 U/L / AST: 51 U/L / GGT: x             Urinalysis Basic - ( 15 Aug 2019 21:18 )  Color: Yellow / Appearance: Slightly Turbid / S.010 / pH: x  Gluc: x / Ketone: Negative  / Bili: Negative / Urobili: Negative mg/dL   Blood: x / Protein: 15 mg/dL / Nitrite: Negative   Leuk Esterase: Trace / RBC: 3-5 /HPF / WBC 3-5   Sq Epi: x / Non Sq Epi: Few / Bacteria: Moderate      Lipase, Serum: 376 U/L (08-15 @ 18:56)    Lipase, Serum: 116 U/L ( @ 07:28)    Lipase, Serum: 103 U/L ( @ 07:44)    Lipase, Serum: 111 U/L (08-10 @ 07:18)          MICROBIOLOGY:    Culture - Blood (19 @ 23:03)    Specimen Source: .Blood    Culture Results:   No growth at 5 days.      Culture - Blood (19 @ 23:03)    Specimen Source: .Blood    Culture Results:   No growth at 5 days.        Culture - Urine (19 @ 22:55)    Specimen Source: .Urine    Culture Results:   <10,000 CFU/mL Normal Urogenital Angela            Radiology:    CXR - < from: Xray Chest 1 View- PORTABLE-Urgent (08.15.19 @ 20:41) >  INTERPRETATION:  PROCEDURE: AP view of the chest.    CLINICAL INFORMATION: Fever.    COMPARISON: 2019.    FINDINGS:    Lungs: There are no lung consolidations.  Heart: The heart is normal in size.  Mediastinum: The mediastinum is within normal limits.    IMPRESSION:    No lung consolidations.                 CXR - < from: Xray Chest 1 View- PORTABLE-Urgent (19 @ 05:43) >  INTERPRETATION:  cough      A single portable radiograph suggests cardiomegaly, although there may be   magnification from technique..     No acute congestive changes are seen.     Scattered fibrotic changes are noted in both lungs without acute airspace   disease    No pleural fluid is evident.     A scoliosis is noted with diffuse degenerative changes. A chronic   compression deformity is noted in the lower thoracic region.    IMPRESSION:    Cardiomegaly. Fibrotic changes both lungs. No acute airspace disease or   consolidation suggested. Similar findings were noted on 2019..    < end of copied text >  < from: Xray Chest 1 View- PORTABLE-Urgent (19 @ 05:43) >  INTERPRETATION:  cough      A single portable radiograph suggests cardiomegaly, although there may be   magnification from technique..     No acute congestive changes are seen.     Scattered fibrotic changes are noted in both lungs without acute airspace   disease    No pleural fluid is evident.     A scoliosis is noted with diffuse degenerative changes. A chronic   compression deformity is noted in the lower thoracic region.    IMPRESSION:    Cardiomegaly. Fibrotic changes both lungs. No acute airspace disease or   consolidation suggested. Similar findings were noted on 2019..                             < from: US Duplex Venous Lower Ext Complete, Bilateral (19 @ 13:59) >  TECHNIQUE: Duplex sonography of the BILATERAL LOWER extremity veins with   color and spectral Doppler, with and without compression.      FINDINGS:    There is normal compressibility of the bilateral common femoral, femoral   and popliteal veins.     Doppler examination shows normal spontaneous and phasic flow.    No calf vein thrombosis is detected.    The complex heterogeneous cystic lesion within the left popliteal fossa   extending into the upper calf with multiple internal septations,   measuring 5.1 x 2.2 x 2.6 cm    IMPRESSION:     No evidence of deep venous thrombosis in either lower extremity.    Indeterminant complex heterogeneous cystic lesion within the left   popliteal fossa extending into the left upper calf. MRI is recommended   for further evaluation.                                  < from: NM Hepatobiliary Imaging (19 @ 12:37) >    RADIOPHARMACEUTICAL: 3.0 mCi Tc-99m-Mebrofenin, I.V.; 2 doses    TECHNIQUE:  Dynamic images of the anterior abdomen were obtained for 1   hour following injection of radiotracer. Morphine 2 mg I.V. and a second   dose of radiotracer were administered at 1 hour. Dynamic imaging was   continued for 1 hour followed by static images of the abdomen in the   anterior and right anterior oblique views immediately thereafter.    FINDINGS: There is prompt, homogeneous uptake of radiotracer by the   hepatocytes. Activity is first seen in the bowel at 20 minutes. The   gallbladder is not visualized in the first hour of imaging, but was seen   15 minutes after the administration of morphine. There is good clearance   of activity from the liver at the end ofthe study.    IMPRESSION: Normal morphine-augmented hepatobiliary scan.    No radionuclide evidence of acute cholecystitis.                                 < from: CT Abdomen and Pelvis w/ Oral Cont and w/ IV Cont (08.10.19 @ 20:50) >    EXAM:  CT ABDOMEN AND PELVIS OC IC                            PROCEDURE DATE:  08/10/2019          INTERPRETATION:  CLINICAL INFORMATION: Fever, cholecystitis, abdominal   pain    COMPARISON: CT dated 2017, ultrasound dated 2019 and MRI dated   2019    PROCEDURE:   CT of the Abdomen and Pelvis was performed with intravenous contrast.   Intravenous contrast: 80 ml Omnipaque 350. 20 ml discarded.  Oral contrast: positive contrast was administered.  Sagittal and coronal reformats wereperformed.    FINDINGS:    LOWER CHEST: Basilar atelectasis. Enlarged heart with coronary artery   calcifications.    LIVER: Multiple subcentimeter low attenuating lesions are present in the   liver, too small to further characterize.  BILE DUCTS: Normal caliber.  GALLBLADDER: Cholelithiasis.  SPLEEN: Within normal limits.  PANCREAS: 1 cm cystic pancreatic body lesion.  ADRENALS: Within normal limits.  KIDNEYS/URETERS: 3 cm left renal cyst. Additional subcentimeter renal   hypodensities are too small to further characterize. No hydronephrosis.    BLADDER: Within normal limits.  REPRODUCTIVE ORGANS: Uterus and adnexa within normal limits.    BOWEL: Duodenal diverticulum. No bowel obstruction. Appendix within   normal limits. Colonic diverticulosis.  PERITONEUM: No ascites.  VESSELS: Atherosclerotic changes.  RETROPERITONEUM/LYMPH NODES: No lymphadenopathy. Several prominent   retroperitoneal lymph nodes.  ABDOMINAL WALL: Tiny fat-containing umbilical hernia.  BONES: Spinal degenerative changes.    IMPRESSION:     Cholelithiasis.  1 cm cystic pancreatic body lesion.                                                             < from: MR MRCP No Cont (19 @ 18:18) >  FINDINGS:     There are no focal hepatic lesions identified.  No intra or extrahepatic   biliary dilatation is noted. The CBD measures 7mm. No intraductal   biliary calculi are identified. The gallbladder is visualized and   contains stones.     There is a cystic pancreatic body lesion measuring 1 cm, possibly chronic   pseudocyst or intraductal papillary mucinous neoplasm. The pancreas   otherwise demonstrates normal T1 signal. The pancreatic duct is normal in   caliber. A periampullary duodenal diverticulum is noted.    There are renal cysts measuring up to 2.5 cm. The spleen, adrenal glands,   and kidneys are otherwise unremarkable in appearance.      There is no retroperitoneal adenopathy. Prominent retroperitoneal lymph   nodes are noted. There is trace ascites.      IMPRESSION:      Cholelithiasis.  No choledocholithiasis or biliary ductal dilatation.  1 cm cystic pancreatic body lesion, possibly chronic pseudocyst or   intraductal papillary mucinous neoplasm.                                                        < from: US Abdomen Complete (19 @ 23:58) >  COMPARISON: None available.    TECHNIQUE: Sonography of the abdomen.     FINDINGS:    Liver: Within normal limits.    Bile ducts: Normal caliber. Common bile duct measures 8 mm.     Gallbladder: Gallstone. Mildly thickened walls measuring 4 mm.  Trace   pericholecystic fluid. Positive sonographic Rock sign.    Pancreas: There is a 0.9 x 0.9 x 0.7 cm cystic lesion. Comparison to the   previous CT of 2017 demonstrated a subcentimeter lesion   measuring 0.7 x 0.6 cm.      Spleen: 5.7 cm. Within normal limits.    Right kidney: 9.0 cm. No hydronephrosis.    Left kidney: 9.1 cm.  No hydronephrosis. There is a 2.3 x 1.5 x 2.3 cm   interpolar cyst.    Ascites: None.    Aorta and IVC: Visualized portions are within normal limits.    IMPRESSION:     Gallstones with mild wall thickening and pericholecystic fluid. Negative   sonographic Rock's sign. Findings suspicious for acute cholecystitis.    If clinically indicated, further evaluation with nuclear HIDA scan may be   considered.  0.9 cm cystic lesion in the body of the pancreas appears enlarged   compared to the previous CT of 2017. If clinically indicated,   further evaluation with MRI/MRCP may be considered.                                              < from: Xray Chest 1 View- PORTABLE-Urgent (19 @ 18:35) >  INTERPRETATION:  cough    A frontal chest film  demonstrates grossly clear lungs.  No acute   infiltrate or consolidation is  noted. The costophrenicangles are   grossly clear.    Heart is mildly enlarged      No mediastinal or hilar adenopathy is suggested. .     Degenerative changes noted in both shoulders.     IMPRESSION:  Clear  lungs.  No acute airspace disease suggested.                    Vital Signs Last 24 Hrs  T(C): 36.7 (16 Aug 2019 05:06), Max: 38.9 (15 Aug 2019 17:04)  T(F): 98.1 (16 Aug 2019 05:06), Max: 102 (15 Aug 2019 17:04)  HR: 69 (16 Aug 2019 05:06) (69 - 106)  BP: 132/70 (16 Aug 2019 05:06) (132/70 - 139/77)  BP(mean): --  RR: 18 (16 Aug 2019 05:06) (18 - 18)  SpO2: 98% (16 Aug 2019 05:06) (95% - 98%)  Supplemental O2:    PHYSICAL EXAM    General:  HEENT:   Neck:  Heart:  Lungs:  Abdomen:  Extremities:  Skin:          I&O's Summary :    15 Aug 2019 07:  -  16 Aug 2019 07:00  --------------------------------------------------------  IN: 710 mL / OUT: 1850 mL / NET: -1140 mL    16 Aug 2019 07:  -  16 Aug 2019 14:17  --------------------------------------------------------  IN: 0 mL / OUT: 900 mL / NET: -900 mL        Impression:    Suggestions: HPI:  85 y/o female with hx of HTN, DM, HLD, OA with chronic shoulder and hip pain, CHF, Gastric Ulcer, admitted via the ER on   with c/o severe mid-abdominal pain associated with a few episodes of N/V along with fever and chills for 2 days PTA.  In the ER patient was found to have an elevated WBC's to 17.05 along with increase LFT's and TBili.  Blood and Urine Cx's were obtained and patient was begun empirically on Zosyn.  Further w/u subsequently revealed Cholelithiasis and possible Cholecystitis and a Pancreatic ? Cyst,  but HIDA Scan was reported negative for Acute Cholecystitis.  Temps were  initially decreasing on the Zosyn along with decreasing WBC's and LFT's, but yesterday patient developed increase Temp to 102 and new Blood and Urine Cx's were obtained.  Repeat CXR was done and reported with no consolidation.  Patient presently denies any abdominal pain and no c/o N/V/D.  She claims her appetite is fair and denies any SOB or cp now.  Intermittent dry cough reported and patient does c/o pain in both of her legs with difficulty moving them now.  Prior to admission she reports ambulating usually with a cane.  No c/o pruritus, skin changes, or rash now, and no c/o difficulty swallowing or difficulty voiding.  No c/o back pain. Patient did feel chills yesterday when she had fever again.      Allergies :    dust (Sneezing)  No Known Drug Allergies  POLLEN (Rhinorrhea; Sneezing)    Intolerances - none        Social History:  Tobacco: negative  Alcohol: negative  Recent Travel: none  Immunizations: has rec'd Pneumovax in the past and Influenza vaccines yearly  Lives at home with her daughter  No pets  Ambulates usually with a cane  Born in [facility] - [place] yrs.      MEDICATIONS  (STANDING):  amLODIPine   Tablet 10 milliGRAM(s) Oral daily  atorvastatin 20 milliGRAM(s) Oral at bedtime  dextrose 5%. 1000 milliLiter(s) (50 mL/Hr) IV Continuous <Continuous>  dextrose 50% Injectable 12.5 Gram(s) IV Push once  dextrose 50% Injectable 25 Gram(s) IV Push once  dextrose 50% Injectable 25 Gram(s) IV Push once  enalapril 20 milliGRAM(s) Oral two times a day  heparin  Injectable 5000 Unit(s) SubCutaneous every 12 hours  insulin lispro (HumaLOG) corrective regimen sliding scale   SubCutaneous three times a day before meals  insulin lispro (HumaLOG) corrective regimen sliding scale   SubCutaneous at bedtime  metoprolol tartrate 100 milliGRAM(s) Oral two times a day  pantoprazole    Tablet 40 milliGRAM(s) Oral before breakfast  piperacillin/tazobactam IVPB.. 3.375 Gram(s) IV Intermittent every 8 hours  sodium chloride 0.9%. 1000 milliLiter(s) (30 mL/Hr) IV Continuous <Continuous>    MEDICATIONS  (PRN):  acetaminophen   Tablet .. 650 milliGRAM(s) Oral every 6 hours PRN Temp greater or equal to 38C (100.4F), Mild Pain (1 - 3)  dextrose 40% Gel 15 Gram(s) Oral once PRN Blood Glucose LESS THAN 70 milliGRAM(s)/deciliter  glucagon  Injectable 1 milliGRAM(s) IntraMuscular once PRN Glucose LESS THAN 70 milligrams/deciliter  loratadine 10 milliGRAM(s) Oral daily PRN allergies  ondansetron Injectable 4 milliGRAM(s) IV Push every 6 hours PRN Nausea        LABS:  CBC Full  -  ( 16 Aug 2019 08:06 )  WBC Count : 8.01 K/uL  RBC Count : 3.31 M/uL  Hemoglobin : 9.2 g/dL  Hematocrit : 26.7 %  Platelet Count - Automated : 435 K/uL  Mean Cell Volume : 80.7 fl  Mean Cell Hemoglobin : 27.8 pg  Mean Cell Hemoglobin Concentration : 34.5 gm/dL  Auto Neutrophil # : x  Auto Lymphocyte # : x  Auto Monocyte # : x  Auto Eosinophil # : x  Auto Basophil # : x  Auto Neutrophil % : x  Auto Lymphocyte % : x  Auto Monocyte % : x  Auto Eosinophil % : x  Auto Basophil % : x        136  |  104  |  10  ----------------------------<  125<H>  3.8   |  25  |  0.72    Ca    9.0      16 Aug 2019 08:06  Phos  3.1       Mg     2.2         TPro  7.3  /  Alb  2.1<L>  /  TBili  1.0  /  DBili  x   /  AST  51<H>  /  ALT  97<H>  /  AlkPhos  263<H>      LIVER FUNCTIONS - ( 16 Aug 2019 08:06 )  Alb: 2.1 g/dL / Pro: 7.3 gm/dL / ALK PHOS: 263 U/L / ALT: 97 U/L / AST: 51 U/L / GGT: x             Urinalysis Basic - ( 15 Aug 2019 21:18 )  Color: Yellow / Appearance: Slightly Turbid / S.010 / pH: x  Gluc: x / Ketone: Negative  / Bili: Negative / Urobili: Negative mg/dL   Blood: x / Protein: 15 mg/dL / Nitrite: Negative   Leuk Esterase: Trace / RBC: 3-5 /HPF / WBC 3-5   Sq Epi: x / Non Sq Epi: Few / Bacteria: Moderate      Lipase, Serum: 376 U/L (08-15 @ 18:56)    Lipase, Serum: 116 U/L ( @ 07:28)    Lipase, Serum: 103 U/L ( @ 07:44)    Lipase, Serum: 111 U/L (08-10 @ 07:18)          MICROBIOLOGY:    Culture - Blood (19 @ 23:03)    Specimen Source: .Blood    Culture Results:   No growth at 5 days.      Culture - Blood (19 @ 23:03)    Specimen Source: .Blood    Culture Results:   No growth at 5 days.        Culture - Urine (19 @ 22:55)    Specimen Source: .Urine    Culture Results:   <10,000 CFU/mL Normal Urogenital Angela            Radiology:    CXR - < from: Xray Chest 1 View- PORTABLE-Urgent (08.15.19 @ 20:41) >  INTERPRETATION:  PROCEDURE: AP view of the chest.    CLINICAL INFORMATION: Fever.    COMPARISON: 2019.    FINDINGS:    Lungs: There are no lung consolidations.  Heart: The heart is normal in size.  Mediastinum: The mediastinum is within normal limits.    IMPRESSION:    No lung consolidations.                 CXR - < from: Xray Chest 1 View- PORTABLE-Urgent (19 @ 05:43) >  INTERPRETATION:  cough      A single portable radiograph suggests cardiomegaly, although there may be   magnification from technique..     No acute congestive changes are seen.     Scattered fibrotic changes are noted in both lungs without acute airspace   disease    No pleural fluid is evident.     A scoliosis is noted with diffuse degenerative changes. A chronic   compression deformity is noted in the lower thoracic region.    IMPRESSION:    Cardiomegaly. Fibrotic changes both lungs. No acute airspace disease or   consolidation suggested. Similar findings were noted on 2019..    < end of copied text >  < from: Xray Chest 1 View- PORTABLE-Urgent (19 @ 05:43) >  INTERPRETATION:  cough      A single portable radiograph suggests cardiomegaly, although there may be   magnification from technique..     No acute congestive changes are seen.     Scattered fibrotic changes are noted in both lungs without acute airspace   disease    No pleural fluid is evident.     A scoliosis is noted with diffuse degenerative changes. A chronic   compression deformity is noted in the lower thoracic region.    IMPRESSION:    Cardiomegaly. Fibrotic changes both lungs. No acute airspace disease or   consolidation suggested. Similar findings were noted on 2019..                             < from: US Duplex Venous Lower Ext Complete, Bilateral (19 @ 13:59) >  TECHNIQUE: Duplex sonography of the BILATERAL LOWER extremity veins with   color and spectral Doppler, with and without compression.      FINDINGS:    There is normal compressibility of the bilateral common femoral, femoral   and popliteal veins.     Doppler examination shows normal spontaneous and phasic flow.    No calf vein thrombosis is detected.    The complex heterogeneous cystic lesion within the left popliteal fossa   extending into the upper calf with multiple internal septations,   measuring 5.1 x 2.2 x 2.6 cm    IMPRESSION:     No evidence of deep venous thrombosis in either lower extremity.    Indeterminant complex heterogeneous cystic lesion within the left   popliteal fossa extending into the left upper calf. MRI is recommended   for further evaluation.                                  < from: NM Hepatobiliary Imaging (19 @ 12:37) >    RADIOPHARMACEUTICAL: 3.0 mCi Tc-99m-Mebrofenin, I.V.; 2 doses    TECHNIQUE:  Dynamic images of the anterior abdomen were obtained for 1   hour following injection of radiotracer. Morphine 2 mg I.V. and a second   dose of radiotracer were administered at 1 hour. Dynamic imaging was   continued for 1 hour followed by static images of the abdomen in the   anterior and right anterior oblique views immediately thereafter.    FINDINGS: There is prompt, homogeneous uptake of radiotracer by the   hepatocytes. Activity is first seen in the bowel at 20 minutes. The   gallbladder is not visualized in the first hour of imaging, but was seen   15 minutes after the administration of morphine. There is good clearance   of activity from the liver at the end ofthe study.    IMPRESSION: Normal morphine-augmented hepatobiliary scan.    No radionuclide evidence of acute cholecystitis.                                 < from: CT Abdomen and Pelvis w/ Oral Cont and w/ IV Cont (08.10.19 @ 20:50) >    EXAM:  CT ABDOMEN AND PELVIS OC IC                            PROCEDURE DATE:  08/10/2019          INTERPRETATION:  CLINICAL INFORMATION: Fever, cholecystitis, abdominal   pain    COMPARISON: CT dated 2017, ultrasound dated 2019 and MRI dated   2019    PROCEDURE:   CT of the Abdomen and Pelvis was performed with intravenous contrast.   Intravenous contrast: 80 ml Omnipaque 350. 20 ml discarded.  Oral contrast: positive contrast was administered.  Sagittal and coronal reformats wereperformed.    FINDINGS:    LOWER CHEST: Basilar atelectasis. Enlarged heart with coronary artery   calcifications.    LIVER: Multiple subcentimeter low attenuating lesions are present in the   liver, too small to further characterize.  BILE DUCTS: Normal caliber.  GALLBLADDER: Cholelithiasis.  SPLEEN: Within normal limits.  PANCREAS: 1 cm cystic pancreatic body lesion.  ADRENALS: Within normal limits.  KIDNEYS/URETERS: 3 cm left renal cyst. Additional subcentimeter renal   hypodensities are too small to further characterize. No hydronephrosis.    BLADDER: Within normal limits.  REPRODUCTIVE ORGANS: Uterus and adnexa within normal limits.    BOWEL: Duodenal diverticulum. No bowel obstruction. Appendix within   normal limits. Colonic diverticulosis.  PERITONEUM: No ascites.  VESSELS: Atherosclerotic changes.  RETROPERITONEUM/LYMPH NODES: No lymphadenopathy. Several prominent   retroperitoneal lymph nodes.  ABDOMINAL WALL: Tiny fat-containing umbilical hernia.  BONES: Spinal degenerative changes.    IMPRESSION:     Cholelithiasis.  1 cm cystic pancreatic body lesion.                                                             < from: MR MRCP No Cont (19 @ 18:18) >  FINDINGS:     There are no focal hepatic lesions identified.  No intra or extrahepatic   biliary dilatation is noted. The CBD measures 7mm. No intraductal   biliary calculi are identified. The gallbladder is visualized and   contains stones.     There is a cystic pancreatic body lesion measuring 1 cm, possibly chronic   pseudocyst or intraductal papillary mucinous neoplasm. The pancreas   otherwise demonstrates normal T1 signal. The pancreatic duct is normal in   caliber. A periampullary duodenal diverticulum is noted.    There are renal cysts measuring up to 2.5 cm. The spleen, adrenal glands,   and kidneys are otherwise unremarkable in appearance.      There is no retroperitoneal adenopathy. Prominent retroperitoneal lymph   nodes are noted. There is trace ascites.      IMPRESSION:      Cholelithiasis.  No choledocholithiasis or biliary ductal dilatation.  1 cm cystic pancreatic body lesion, possibly chronic pseudocyst or   intraductal papillary mucinous neoplasm.                                                        < from: US Abdomen Complete (19 @ 23:58) >  COMPARISON: None available.    TECHNIQUE: Sonography of the abdomen.     FINDINGS:    Liver: Within normal limits.    Bile ducts: Normal caliber. Common bile duct measures 8 mm.     Gallbladder: Gallstone. Mildly thickened walls measuring 4 mm.  Trace   pericholecystic fluid. Positive sonographic Rock sign.    Pancreas: There is a 0.9 x 0.9 x 0.7 cm cystic lesion. Comparison to the   previous CT of 2017 demonstrated a subcentimeter lesion   measuring 0.7 x 0.6 cm.      Spleen: 5.7 cm. Within normal limits.    Right kidney: 9.0 cm. No hydronephrosis.    Left kidney: 9.1 cm.  No hydronephrosis. There is a 2.3 x 1.5 x 2.3 cm   interpolar cyst.    Ascites: None.    Aorta and IVC: Visualized portions are within normal limits.    IMPRESSION:     Gallstones with mild wall thickening and pericholecystic fluid. Negative   sonographic Rock's sign. Findings suspicious for acute cholecystitis.    If clinically indicated, further evaluation with nuclear HIDA scan may be   considered.  0.9 cm cystic lesion in the body of the pancreas appears enlarged   compared to the previous CT of 2017. If clinically indicated,   further evaluation with MRI/MRCP may be considered.                                              < from: Xray Chest 1 View- PORTABLE-Urgent (19 @ 18:35) >  INTERPRETATION:  cough    A frontal chest film  demonstrates grossly clear lungs.  No acute   infiltrate or consolidation is  noted. The costophrenicangles are   grossly clear.    Heart is mildly enlarged      No mediastinal or hilar adenopathy is suggested. .     Degenerative changes noted in both shoulders.     IMPRESSION:  Clear  lungs.  No acute airspace disease suggested.             Vital Signs Last 24 Hrs  T(C): 36.7 (16 Aug 2019 05:06), Max: 38.9 (15 Aug 2019 17:04)  T(F): 98.1 (16 Aug 2019 05:06), Max: 102 (15 Aug 2019 17:04)  HR: 69 (16 Aug 2019 05:06) (69 - 106)  BP: 132/70 (16 Aug 2019 05:06) (132/70 - 139/77)  BP(mean): --  RR: 18 (16 Aug 2019 05:06) (18 - 18)  SpO2: 98% (16 Aug 2019 05:06) (95% - 98%)  Supplemental O2: on RA now    PHYSICAL EXAM    General: 85 y/o female awake, alert, sitting upright in bed, pleasant and cooperative, in NAD now  HEENT: conj pink, sclerae anicteric, PERRLA, no oral lesions noted  Neck: supple, no nodes noted  Heart: RR  Lungs: few rales at both bases otherwise clear  Abdomen: BS+, soft, nontender to palpation, no masses or HS-megaly detected  No CVA or Spinal tenderness elicited to palpation  Extremities: no edema LE's                    Both knees slightly warm to touch and with mild swelling noted - nontender to palpation but patient unable to flex either knee on her own or with assistance now  Skin: warm, dry, no rash noted  External Female Catheter in place with daniele-colored urine noted to be draining now          I&O's Summary :    15 Aug 2019 07:  -  16 Aug 2019 07:00  --------------------------------------------------------  IN: 710 mL / OUT: 1850 mL / NET: -1140 mL    16 Aug 2019 07:01  -  16 Aug 2019 14:17  --------------------------------------------------------  IN: 0 mL / OUT: 900 mL / NET: -900 mL        Impression:  85 y/o female with hx of DM, HTN, HLD, CHF, Gastric Ulcer, OA with chronic shoulder and hip pain admitted on  with Sepsis thought to be secondary to Acute Cholecystitis with Cholelithiasis and initially improved with ab rx with Zosyn, and now with recurrent episode of Fever to 102 with associated chills and dry cough - ? early PNA ( CXR reported with no infiltrates/ consolidation seen ). - No other obvious source of Sepsis at this time although concerned re: Lt Popliteal Complex Cyst reported on Doppler study.      Suggestions:  Will therefore continue current ab rx with Zosyn for now and follow-up new Blood and Urine Cx's.  Check Hepatitis Profile as well as ESR/ CRP and repeat CBC and CMP.  Consider CT Chest to further evaluate as well as ? MRI of the Lt Knee as recommended by radiology to further evaluate the Lt Popliteal Complex Cystic lesion described on Doppler.  Discussed in detail with patient at bedside.  Thanks, will follow with you.

## 2019-08-16 NOTE — CHART NOTE - NSCHARTNOTEFT_GEN_A_CORE
Discussed patient with Dr. Mcdowell, patient still febrile, Tmax 102 noted yesterday. Patient with no complaints, denies abdominal pain and completely nontender.   Blood cultures and urine culture pending.   AM labs stable.  ID consult (Dr. Montes)  Patient transferred to Hospitalist service, discussed patient with Dr. Woods who accepted.   Will cont. to follow patient.  As per Dr. Mcdowell, Ana Paula nuñez planning as outpatient.

## 2019-08-16 NOTE — PROGRESS NOTE ADULT - SUBJECTIVE AND OBJECTIVE BOX
Patient is a 86y old  Female who presents with a chief complaint of Abdominal pain (16 Aug 2019 14:16)      INTERVAL HPI/ OVERNIGHT EVENTS: Pt was seen and examined at bedside today, Fevers overnight, pt denies abdominal pain, coughing, diarrhea and dysuria.     MEDICATIONS  (STANDING):  amLODIPine   Tablet 10 milliGRAM(s) Oral daily  atorvastatin 20 milliGRAM(s) Oral at bedtime  dextrose 5%. 1000 milliLiter(s) (50 mL/Hr) IV Continuous <Continuous>  dextrose 50% Injectable 12.5 Gram(s) IV Push once  dextrose 50% Injectable 25 Gram(s) IV Push once  dextrose 50% Injectable 25 Gram(s) IV Push once  enalapril 20 milliGRAM(s) Oral two times a day  heparin  Injectable 5000 Unit(s) SubCutaneous every 12 hours  insulin lispro (HumaLOG) corrective regimen sliding scale   SubCutaneous three times a day before meals  insulin lispro (HumaLOG) corrective regimen sliding scale   SubCutaneous at bedtime  metoprolol tartrate 100 milliGRAM(s) Oral two times a day  pantoprazole    Tablet 40 milliGRAM(s) Oral before breakfast  piperacillin/tazobactam IVPB.. 3.375 Gram(s) IV Intermittent every 8 hours  sodium chloride 0.9%. 1000 milliLiter(s) (30 mL/Hr) IV Continuous <Continuous>    MEDICATIONS  (PRN):  acetaminophen   Tablet .. 650 milliGRAM(s) Oral every 6 hours PRN Temp greater or equal to 38C (100.4F), Mild Pain (1 - 3)  dextrose 40% Gel 15 Gram(s) Oral once PRN Blood Glucose LESS THAN 70 milliGRAM(s)/deciliter  glucagon  Injectable 1 milliGRAM(s) IntraMuscular once PRN Glucose LESS THAN 70 milligrams/deciliter  loratadine 10 milliGRAM(s) Oral daily PRN allergies  ondansetron Injectable 4 milliGRAM(s) IV Push every 6 hours PRN Nausea      Allergies    dust (Sneezing)  No Known Drug Allergies  POLLEN (Rhinorrhea; Sneezing)    Intolerances        REVIEW OF SYSTEMS:    Unable to examine due to [ ] Encephalopathy [ ] Advanced Dementia [ ] Expressive Aphasia [ ] Non-verbal patient    CONSTITUTIONAL: Positive fever, positive generalized weakness/Fatigue, No weight loss  EYES: No eye pain, visual disturbances, or discharge  ENMT:  No difficulty hearing, tinnitus, vertigo; No sinus or throat pain  NECK: No pain or stiffness  RESPIRATORY: No shortness of breath,  cough, wheezing, sputum or hemoptysis   CARDIOVASCULAR: No chest pain, palpitations, or leg swelling  GASTROINTESTINAL: No abdominal pain. No nausea, vomiting, diarrhea or constipation. No melena or hematochezia.  GENITOURINARY: No dysuria, frequency, hematuria, or incontinence  NEUROLOGICAL: No headaches, Dizziness, memory loss, loss of strength, numbness, or tremors  SKIN: No itching, burning, rashes, or lesions   MUSCULOSKELETAL: No joint pain or swelling; No muscle, back, or extremity pain  PSYCHIATRIC: No depression, anxiety, mood swings, or difficulty sleeping  HEME/LYMPH: No easy bruising, or bleeding gums      Vital Signs Last 24 Hrs  T(C): 37.2 (16 Aug 2019 17:03), Max: 37.6 (15 Aug 2019 20:10)  T(F): 98.9 (16 Aug 2019 17:03), Max: 99.7 (15 Aug 2019 20:10)  HR: 97 (16 Aug 2019 17:03) (69 - 97)  BP: 131/74 (16 Aug 2019 17:03) (131/74 - 136/67)  BP(mean): --  RR: 17 (16 Aug 2019 17:03) (17 - 18)  SpO2: 98% (16 Aug 2019 17:03) (97% - 98%)    PHYSICAL EXAM:  GENERAL: NAD, well-developed, well-groomed  HEAD:  Atraumatic, Normocephalic  EYES: conjunctiva and sclera clear  ENMT: Moist mucous membranes  NECK: Supple, No JVD, Normal thyroid  CHEST/LUNG: Clear to Auscultation bilaterally; No rales, rhonchi, wheezing, or rubs  HEART: Regular rate and rhythm; No murmurs, rubs, or gallops  ABDOMEN: Soft, Nontender, Nondistended; Bowel sounds present  EXTREMITIES:  2+ Peripheral Pulses, No clubbing, cyanosis, or edema  SKIN: No rashes or lesions  NERVOUS SYSTEM:  Alert & Oriented X3, Good concentration; Motor Strength 5/5 B/L upper and lower extremities      LABS:                        9.2    8.01  )-----------( 435      ( 16 Aug 2019 08:06 )             26.7     08-16    136  |  104  |  10  ----------------------------<  125<H>  3.8   |  25  |  0.72    Ca    9.0      16 Aug 2019 08:06  Phos  3.1       Mg     2.2         TPro  7.3  /  Alb  2.1<L>  /  TBili  1.0  /  DBili  x   /  AST  51<H>  /  ALT  97<H>  /  AlkPhos  263<H>        Urinalysis Basic - ( 15 Aug 2019 21:18 )    Color: Yellow / Appearance: Slightly Turbid / S.010 / pH: x  Gluc: x / Ketone: Negative  / Bili: Negative / Urobili: Negative mg/dL   Blood: x / Protein: 15 mg/dL / Nitrite: Negative   Leuk Esterase: Trace / RBC: 3-5 /HPF / WBC 3-5   Sq Epi: x / Non Sq Epi: Few / Bacteria: Moderate      CAPILLARY BLOOD GLUCOSE      POCT Blood Glucose.: 170 mg/dL (16 Aug 2019 15:45)  POCT Blood Glucose.: 144 mg/dL (16 Aug 2019 10:41)  POCT Blood Glucose.: 126 mg/dL (16 Aug 2019 07:50)  POCT Blood Glucose.: 179 mg/dL (15 Aug 2019 21:34)          RADIOLOGY & ADDITIONAL TESTS:          Imaging Personally Reviewed:  [ ] YES  [ ] NO    Consultant(s) Notes Reviewed:  [ ] YES  [ ] NO    Care Discussed with Consultants/Other Providers [x ] YES  [ ] NO

## 2019-08-16 NOTE — PROGRESS NOTE ADULT - SUBJECTIVE AND OBJECTIVE BOX
Pt stable - no c/o abd pain  on zosyn      MEDICATIONS  (STANDING):  amLODIPine   Tablet 10 milliGRAM(s) Oral daily  atorvastatin 20 milliGRAM(s) Oral at bedtime  dextrose 5%. 1000 milliLiter(s) (50 mL/Hr) IV Continuous <Continuous>  dextrose 50% Injectable 12.5 Gram(s) IV Push once  dextrose 50% Injectable 25 Gram(s) IV Push once  dextrose 50% Injectable 25 Gram(s) IV Push once  enalapril 20 milliGRAM(s) Oral two times a day  heparin  Injectable 5000 Unit(s) SubCutaneous every 12 hours  insulin lispro (HumaLOG) corrective regimen sliding scale   SubCutaneous three times a day before meals  insulin lispro (HumaLOG) corrective regimen sliding scale   SubCutaneous at bedtime  metoprolol tartrate 100 milliGRAM(s) Oral two times a day  pantoprazole    Tablet 40 milliGRAM(s) Oral before breakfast  piperacillin/tazobactam IVPB.. 3.375 Gram(s) IV Intermittent every 8 hours  sodium chloride 0.9%. 1000 milliLiter(s) (30 mL/Hr) IV Continuous <Continuous>    MEDICATIONS  (PRN):  acetaminophen   Tablet .. 650 milliGRAM(s) Oral every 6 hours PRN Temp greater or equal to 38C (100.4F), Mild Pain (1 - 3)  dextrose 40% Gel 15 Gram(s) Oral once PRN Blood Glucose LESS THAN 70 milliGRAM(s)/deciliter  glucagon  Injectable 1 milliGRAM(s) IntraMuscular once PRN Glucose LESS THAN 70 milligrams/deciliter  loratadine 10 milliGRAM(s) Oral daily PRN allergies  ondansetron Injectable 4 milliGRAM(s) IV Push every 6 hours PRN Nausea      Allergies    dust (Sneezing)  No Known Drug Allergies  POLLEN (Rhinorrhea; Sneezing)    Intolerances        Vital Signs Last 24 Hrs  T(C): 36.7 (16 Aug 2019 05:06), Max: 38.9 (15 Aug 2019 17:04)  T(F): 98.1 (16 Aug 2019 05:06), Max: 102 (15 Aug 2019 17:04)  HR: 69 (16 Aug 2019 05:06) (69 - 106)  BP: 132/70 (16 Aug 2019 05:06) (132/70 - 146/75)  BP(mean): 104 (15 Aug 2019 13:45) (104 - 104)  RR: 18 (16 Aug 2019 05:06) (18 - 18)  SpO2: 98% (16 Aug 2019 05:06) (95% - 98%)    PHYSICAL EXAM:  General: NAD.  CVS: S1, S2  Chest: air entry bilaterally present  Abd: BS present, soft, non-tender      LABS:                        9.2    8.01  )-----------( 435      ( 16 Aug 2019 08:06 )             26.7     08-16    136  |  104  |  10  ----------------------------<  125<H>  3.8   |  25  |  0.72    Ca    9.0      16 Aug 2019 08:06  Phos  3.1     08-16  Mg     2.2     08-16    TPro  7.3  /  Alb  2.1<L>  /  TBili  1.0  /  DBili  x   /  AST  51<H>  /  ALT  97<H>  /  AlkPhos  263<H>  08-16        continue antibx  stable from GI standpoint

## 2019-08-16 NOTE — PROGRESS NOTE ADULT - SUBJECTIVE AND OBJECTIVE BOX
SURGERY PROGRESS HPI:  Pt seen and examined at bedside. Denies complaints. Denies pain. No acute events overnight. Pt tolerating regular diet. Pt denies nausea and vomiting.  Normal BM/flatus. Voiding well. Pt denies chest pain, SOB, dizziness, fever, chills.     Vital Signs Last 24 Hrs  T(C): 36.7 (16 Aug 2019 05:06), Max: 38.9 (15 Aug 2019 17:04)  T(F): 98.1 (16 Aug 2019 05:06), Max: 102 (15 Aug 2019 17:04)  HR: 69 (16 Aug 2019 05:06) (69 - 106)  BP: 132/70 (16 Aug 2019 05:06) (132/70 - 146/75)  BP(mean): 104 (15 Aug 2019 13:45) (104 - 104)  RR: 18 (16 Aug 2019 05:06) (18 - 18)  SpO2: 98% (16 Aug 2019 05:06) (95% - 98%)      PHYSICAL EXAM:    GENERAL: NAD  CHEST/LUNG: Clear to ausculation, bilaterally   HEART: RRR S1S2  ABDOMEN: non distended, +BS, soft, non tender, no guarding  EXTREMITIES:  calf soft, non tender b/l    I&O's Detail    15 Aug 2019 07:01  -  16 Aug 2019 07:00  --------------------------------------------------------  IN:    Oral Fluid: 240 mL    sodium chloride 0.9%.: 270 mL    Solution: 200 mL  Total IN: 710 mL    OUT:    Voided: 1850 mL  Total OUT: 1850 mL    Total NET: -1140 mL          LABS:                        9.2    8.01  )-----------( 435      ( 16 Aug 2019 08:06 )             26.7     08-15    130<L>  |  95<L>  |  13  ----------------------------<  151<H>  4.2   |  25  |  0.82    Ca    9.2      15 Aug 2019 18:56    TPro  8.3  /  Alb  2.4<L>  /  TBili  0.9  /  DBili  .36<H>  /  AST  46<H>  /  ALT  114<H>  /  AlkPhos  294<H>  08-15      Urinalysis Basic - ( 15 Aug 2019 21:18 )    Color: Yellow / Appearance: Slightly Turbid / S.010 / pH: x  Gluc: x / Ketone: Negative  / Bili: Negative / Urobili: Negative mg/dL   Blood: x / Protein: 15 mg/dL / Nitrite: Negative   Leuk Esterase: Trace / RBC: 3-5 /HPF / WBC 3-5   Sq Epi: x / Non Sq Epi: Few / Bacteria: Moderate      A/P: 86F with PMH OA, HTN, HLD, DM, CHF, gastric ulcer admitted with elevated LFTs with suspicion for gallstone pancreatitis vs acute cholecystitis  Cholelithiasis seen on CT and MRCP; no choledocholithiasis. +pancreatic body pseudocyst vs IPMN  Elevated Ca 19-9. HIDA negative. Bacteruria. Tmax yesterday: 102  - Follow up Bcx, Ucx  - Continue abx  - outpatient GI follow up   - will discuss with attending

## 2019-08-16 NOTE — PROGRESS NOTE ADULT - ASSESSMENT
85 y/o female with history of GI  ulcer presents with right upper quadrant abdominal pain, found to have gall stone.      Fever:   -Lack of infectious symptoms  -no leukocytosis  -ID consult appreciated.   -will cont w/ Zosyn and f/u infectious workup     Cholelithiases  - No choledocholithiasis in MRCP  - HIDA is neg for acute cholecystitis  - GI and Surgery on board   - No Sx planned, outpt lap joaquin    Chronic diastolic congestive heart failure:  - Stable  - Continue Enalapril and metoprolol     DM:  - Insulin SS    Hyponatremia:  - Resolved.     HTN:  - Continue current meds    DVTp: Heparin   Disposition: [facility]

## 2019-08-17 LAB
ALBUMIN SERPL ELPH-MCNC: 2.3 G/DL — LOW (ref 3.3–5)
ALP SERPL-CCNC: 293 U/L — HIGH (ref 40–120)
ALT FLD-CCNC: 91 U/L — HIGH (ref 12–78)
ANION GAP SERPL CALC-SCNC: 10 MMOL/L — SIGNIFICANT CHANGE UP (ref 5–17)
AST SERPL-CCNC: 47 U/L — HIGH (ref 15–37)
BASOPHILS # BLD AUTO: 0.05 K/UL — SIGNIFICANT CHANGE UP (ref 0–0.2)
BASOPHILS NFR BLD AUTO: 0.6 % — SIGNIFICANT CHANGE UP (ref 0–2)
BILIRUB DIRECT SERPL-MCNC: 0.29 MG/DL — HIGH (ref 0.05–0.2)
BILIRUB INDIRECT FLD-MCNC: 0.4 MG/DL — SIGNIFICANT CHANGE UP (ref 0.2–1)
BILIRUB SERPL-MCNC: 0.7 MG/DL — SIGNIFICANT CHANGE UP (ref 0.2–1.2)
BUN SERPL-MCNC: 10 MG/DL — SIGNIFICANT CHANGE UP (ref 7–23)
CALCIUM SERPL-MCNC: 9.3 MG/DL — SIGNIFICANT CHANGE UP (ref 8.5–10.1)
CHLORIDE SERPL-SCNC: 101 MMOL/L — SIGNIFICANT CHANGE UP (ref 96–108)
CO2 SERPL-SCNC: 26 MMOL/L — SIGNIFICANT CHANGE UP (ref 22–31)
CREAT SERPL-MCNC: 0.74 MG/DL — SIGNIFICANT CHANGE UP (ref 0.5–1.3)
CRP SERPL-MCNC: 11.39 MG/DL — HIGH (ref 0–0.4)
CULTURE RESULTS: NO GROWTH — SIGNIFICANT CHANGE UP
EOSINOPHIL # BLD AUTO: 0.21 K/UL — SIGNIFICANT CHANGE UP (ref 0–0.5)
EOSINOPHIL NFR BLD AUTO: 2.5 % — SIGNIFICANT CHANGE UP (ref 0–6)
ERYTHROCYTE [SEDIMENTATION RATE] IN BLOOD: 108 MM/HR — HIGH (ref 0–20)
GLUCOSE BLDC GLUCOMTR-MCNC: 144 MG/DL — HIGH (ref 70–99)
GLUCOSE BLDC GLUCOMTR-MCNC: 146 MG/DL — HIGH (ref 70–99)
GLUCOSE BLDC GLUCOMTR-MCNC: 159 MG/DL — HIGH (ref 70–99)
GLUCOSE BLDC GLUCOMTR-MCNC: 160 MG/DL — HIGH (ref 70–99)
GLUCOSE SERPL-MCNC: 140 MG/DL — HIGH (ref 70–99)
HAV IGM SER-ACNC: SIGNIFICANT CHANGE UP
HBV CORE IGM SER-ACNC: SIGNIFICANT CHANGE UP
HBV SURFACE AB SER-ACNC: SIGNIFICANT CHANGE UP
HBV SURFACE AG SER-ACNC: SIGNIFICANT CHANGE UP
HCT VFR BLD CALC: 28.3 % — LOW (ref 34.5–45)
HCV AB S/CO SERPL IA: 0.31 S/CO — SIGNIFICANT CHANGE UP (ref 0–0.99)
HCV AB SERPL-IMP: SIGNIFICANT CHANGE UP
HGB BLD-MCNC: 9.5 G/DL — LOW (ref 11.5–15.5)
IMM GRANULOCYTES NFR BLD AUTO: 0.5 % — SIGNIFICANT CHANGE UP (ref 0–1.5)
LYMPHOCYTES # BLD AUTO: 1.51 K/UL — SIGNIFICANT CHANGE UP (ref 1–3.3)
LYMPHOCYTES # BLD AUTO: 18 % — SIGNIFICANT CHANGE UP (ref 13–44)
MAGNESIUM SERPL-MCNC: 2.3 MG/DL — SIGNIFICANT CHANGE UP (ref 1.6–2.6)
MCHC RBC-ENTMCNC: 27.2 PG — SIGNIFICANT CHANGE UP (ref 27–34)
MCHC RBC-ENTMCNC: 33.6 GM/DL — SIGNIFICANT CHANGE UP (ref 32–36)
MCV RBC AUTO: 81.1 FL — SIGNIFICANT CHANGE UP (ref 80–100)
MONOCYTES # BLD AUTO: 0.76 K/UL — SIGNIFICANT CHANGE UP (ref 0–0.9)
MONOCYTES NFR BLD AUTO: 9.1 % — SIGNIFICANT CHANGE UP (ref 2–14)
NEUTROPHILS # BLD AUTO: 5.8 K/UL — SIGNIFICANT CHANGE UP (ref 1.8–7.4)
NEUTROPHILS NFR BLD AUTO: 69.3 % — SIGNIFICANT CHANGE UP (ref 43–77)
NRBC # BLD: 0 /100 WBCS — SIGNIFICANT CHANGE UP (ref 0–0)
PHOSPHATE SERPL-MCNC: 3.1 MG/DL — SIGNIFICANT CHANGE UP (ref 2.5–4.5)
PLATELET # BLD AUTO: 534 K/UL — HIGH (ref 150–400)
POTASSIUM SERPL-MCNC: 3.9 MMOL/L — SIGNIFICANT CHANGE UP (ref 3.5–5.3)
POTASSIUM SERPL-SCNC: 3.9 MMOL/L — SIGNIFICANT CHANGE UP (ref 3.5–5.3)
PROT SERPL-MCNC: 7.8 GM/DL — SIGNIFICANT CHANGE UP (ref 6–8.3)
RBC # BLD: 3.49 M/UL — LOW (ref 3.8–5.2)
RBC # FLD: 14.6 % — HIGH (ref 10.3–14.5)
SODIUM SERPL-SCNC: 137 MMOL/L — SIGNIFICANT CHANGE UP (ref 135–145)
SPECIMEN SOURCE: SIGNIFICANT CHANGE UP
WBC # BLD: 8.37 K/UL — SIGNIFICANT CHANGE UP (ref 3.8–10.5)
WBC # FLD AUTO: 8.37 K/UL — SIGNIFICANT CHANGE UP (ref 3.8–10.5)

## 2019-08-17 PROCEDURE — 99233 SBSQ HOSP IP/OBS HIGH 50: CPT

## 2019-08-17 PROCEDURE — 71250 CT THORAX DX C-: CPT | Mod: 26

## 2019-08-17 RX ADMIN — Medication 20 MILLIGRAM(S): at 18:08

## 2019-08-17 RX ADMIN — PIPERACILLIN AND TAZOBACTAM 25 GRAM(S): 4; .5 INJECTION, POWDER, LYOPHILIZED, FOR SOLUTION INTRAVENOUS at 05:39

## 2019-08-17 RX ADMIN — HEPARIN SODIUM 5000 UNIT(S): 5000 INJECTION INTRAVENOUS; SUBCUTANEOUS at 18:07

## 2019-08-17 RX ADMIN — ATORVASTATIN CALCIUM 20 MILLIGRAM(S): 80 TABLET, FILM COATED ORAL at 21:22

## 2019-08-17 RX ADMIN — Medication 20 MILLIGRAM(S): at 05:39

## 2019-08-17 RX ADMIN — Medication 1: at 07:57

## 2019-08-17 RX ADMIN — Medication 100 MILLIGRAM(S): at 05:39

## 2019-08-17 RX ADMIN — PIPERACILLIN AND TAZOBACTAM 25 GRAM(S): 4; .5 INJECTION, POWDER, LYOPHILIZED, FOR SOLUTION INTRAVENOUS at 21:22

## 2019-08-17 RX ADMIN — AMLODIPINE BESYLATE 10 MILLIGRAM(S): 2.5 TABLET ORAL at 05:39

## 2019-08-17 RX ADMIN — PANTOPRAZOLE SODIUM 40 MILLIGRAM(S): 20 TABLET, DELAYED RELEASE ORAL at 07:57

## 2019-08-17 RX ADMIN — PIPERACILLIN AND TAZOBACTAM 25 GRAM(S): 4; .5 INJECTION, POWDER, LYOPHILIZED, FOR SOLUTION INTRAVENOUS at 14:27

## 2019-08-17 RX ADMIN — HEPARIN SODIUM 5000 UNIT(S): 5000 INJECTION INTRAVENOUS; SUBCUTANEOUS at 05:39

## 2019-08-17 RX ADMIN — Medication 100 MILLIGRAM(S): at 18:08

## 2019-08-17 NOTE — PROGRESS NOTE ADULT - ASSESSMENT
85 y/o female with history of GI  ulcer presents with right upper quadrant abdominal pain, found to have gall stone.      Fever:   -Lack of infectious symptoms  -no leukocytosis  -ID consult appreciated.   -will cont w/ Zosyn and f/u CT chest and MRI of leg     Cholelithiases  - No choledocholithiasis in MRCP  - HIDA is neg for acute cholecystitis  - GI and Surgery on board   - No Sx planned, outpt lap joaquin    Chronic diastolic congestive heart failure:  - Stable  - Continue Enalapril and metoprolol     DM:  - Insulin SS    Hyponatremia:  - Resolved.     HTN:  - Continue current meds    DVTp: Heparin   Disposition: [facility]

## 2019-08-17 NOTE — PROGRESS NOTE ADULT - SUBJECTIVE AND OBJECTIVE BOX
Pt stable - on antibx  no c/o abd pain      MEDICATIONS  (STANDING):  amLODIPine   Tablet 10 milliGRAM(s) Oral daily  atorvastatin 20 milliGRAM(s) Oral at bedtime  dextrose 5%. 1000 milliLiter(s) (50 mL/Hr) IV Continuous <Continuous>  dextrose 50% Injectable 12.5 Gram(s) IV Push once  dextrose 50% Injectable 25 Gram(s) IV Push once  dextrose 50% Injectable 25 Gram(s) IV Push once  enalapril 20 milliGRAM(s) Oral two times a day  heparin  Injectable 5000 Unit(s) SubCutaneous every 12 hours  insulin lispro (HumaLOG) corrective regimen sliding scale   SubCutaneous three times a day before meals  insulin lispro (HumaLOG) corrective regimen sliding scale   SubCutaneous at bedtime  metoprolol tartrate 100 milliGRAM(s) Oral two times a day  pantoprazole    Tablet 40 milliGRAM(s) Oral before breakfast  piperacillin/tazobactam IVPB.. 3.375 Gram(s) IV Intermittent every 8 hours    MEDICATIONS  (PRN):  acetaminophen   Tablet .. 650 milliGRAM(s) Oral every 6 hours PRN Temp greater or equal to 38C (100.4F), Mild Pain (1 - 3)  dextrose 40% Gel 15 Gram(s) Oral once PRN Blood Glucose LESS THAN 70 milliGRAM(s)/deciliter  glucagon  Injectable 1 milliGRAM(s) IntraMuscular once PRN Glucose LESS THAN 70 milligrams/deciliter  loratadine 10 milliGRAM(s) Oral daily PRN allergies  ondansetron Injectable 4 milliGRAM(s) IV Push every 6 hours PRN Nausea      Allergies    dust (Sneezing)  No Known Drug Allergies  POLLEN (Rhinorrhea; Sneezing)    Intolerances        Vital Signs Last 24 Hrs  T(C): 37.6 (17 Aug 2019 23:46), Max: 37.6 (17 Aug 2019 23:46)  T(F): 99.7 (17 Aug 2019 23:46), Max: 99.7 (17 Aug 2019 23:46)  HR: 84 (17 Aug 2019 23:46) (78 - 97)  BP: 125/65 (17 Aug 2019 23:46) (125/65 - 152/74)  BP(mean): --  RR: 18 (17 Aug 2019 23:46) (17 - 18)  SpO2: 99% (17 Aug 2019 23:46) (98% - 99%)    PHYSICAL EXAM:  General: NAD.  CVS: S1, S2  Chest: air entry bilaterally present  Abd: BS present, soft, non-tender      LABS:                        9.5    8.37  )-----------( 534      ( 17 Aug 2019 07:59 )             28.3     08-17    137  |  101  |  10  ----------------------------<  140<H>  3.9   |  26  |  0.74    Ca    9.3      17 Aug 2019 07:59  Phos  3.1     08-17  Mg     2.3     08-17    TPro  7.8  /  Alb  2.3<L>  /  TBili  0.7  /  DBili  .29<H>  /  AST  47<H>  /  ALT  91<H>  /  AlkPhos  293<H>  08-17      on zosyn  follow labs  no acute GI issues

## 2019-08-17 NOTE — PROGRESS NOTE ADULT - SUBJECTIVE AND OBJECTIVE BOX
INTERVAL HPI/OVERNIGHT EVENTS:  Pt. seen and examined at bedside resting comfortably. No complaints offered. Afebrile, no new events.   Denies f/c, chest pain, dyspnea, cough, dizziness.     Vital Signs Last 24 Hrs  T(C): 36.8 (17 Aug 2019 11:35), Max: 37.4 (16 Aug 2019 23:42)  T(F): 98.3 (17 Aug 2019 11:35), Max: 99.4 (16 Aug 2019 23:42)  HR: 78 (17 Aug 2019 11:35) (78 - 97)  BP: 142/70 (17 Aug 2019 11:35) (131/74 - 142/70)  RR: 17 (17 Aug 2019 11:35) (17 - 18)  SpO2: 99% (17 Aug 2019 11:35) (98% - 99%)    PHYSICAL EXAM:    GENERAL: NAD  CHEST/LUNG: Clear to ausculation, bilaterally   HEART: S1S2  ABDOMEN: non distended, +BS, soft, non tender, no guarding  EXTREMITIES:  calf soft, non tender b/l. 2+ distal pulses b/l.     I&O's Detail    16 Aug 2019 07:01  -  17 Aug 2019 07:00  --------------------------------------------------------  IN:    Oral Fluid: 150 mL    Solution: 200 mL  Total IN: 350 mL    OUT:    Voided: 1650 mL  Total OUT: 1650 mL    Total NET: -1300 mL      17 Aug 2019 07:  -  17 Aug 2019 15:47  --------------------------------------------------------  IN:    Oral Fluid: 240 mL  Total IN: 240 mL    OUT:  Total OUT: 0 mL    Total NET: 240 mL        LABS:                        9.5    8.37  )-----------( 534      ( 17 Aug 2019 07:59 )             28.3     08-17    137  |  101  |  10  ----------------------------<  140<H>  3.9   |  26  |  0.74    Ca    9.3      17 Aug 2019 07:59  Phos  3.1       Mg     2.3         TPro  7.8  /  Alb  2.3<L>  /  TBili  0.7  /  DBili  .29<H>  /  AST  47<H>  /  ALT  91<H>  /  AlkPhos  293<H>        Urinalysis Basic - ( 15 Aug 2019 21:18 )    Color: Yellow / Appearance: Slightly Turbid / S.010 / pH: x  Gluc: x / Ketone: Negative  / Bili: Negative / Urobili: Negative mg/dL   Blood: x / Protein: 15 mg/dL / Nitrite: Negative   Leuk Esterase: Trace / RBC: 3-5 /HPF / WBC 3-5   Sq Epi: x / Non Sq Epi: Few / Bacteria: Moderate      Culture Results:   No growth to date. ( @ 01:27)  Culture Results:   No growth to date. ( @ 01:27)  Culture Results:   No growth ( @ 01:18)    A/P: 86F with PMH OA, HTN, HLD, DM, CHF, gastric ulcer admitted with elevated LFTs with suspicion for gallstone pancreatitis vs acute cholecystitis  Cholelithiasis seen on CT and MRCP; no choledocholithiasis. +pancreatic body pseudocyst vs IPMN  Elevated Ca 19-9. HIDA negative.   Now afebrile, with no abdominal pain or tenderness    - As per discussion with Dr. Mcdowell, no acute surgical intervention, surgery will sign off, please reconsult if any change in clinical status otherwise patient surgically stable for d/c to follow up with surgeon as outpatient  continue medical management and supportive care

## 2019-08-17 NOTE — PROGRESS NOTE ADULT - SUBJECTIVE AND OBJECTIVE BOX
TMAX - 99.4    On day # 10 Zosyn now      Vital Signs Last 24 Hrs  T(C): 36.7 (17 Aug 2019 18:06), Max: 37.4 (16 Aug 2019 23:42)  T(F): 98 (17 Aug 2019 18:06), Max: 99.4 (16 Aug 2019 23:42)  HR: 97 (17 Aug 2019 18:06) (78 - 97)  BP: 152/74 (17 Aug 2019 18:06) (138/72 - 152/74)  BP(mean): --  RR: 18 (17 Aug 2019 18:06) (17 - 18)  SpO2: 98% (17 Aug 2019 18:06) (98% - 99%)  Supplemental O2:  on RA now      Awake, alert, c/o episode of abdominal pain both RUQ and LUQ last PM, but none since then and no c/o N/V.  Claims she was straining to have a BM though,  No c/o SOB or cp and intermittent dry cough.  Tolerating po diet and claims her appetite is fair.  Family at bedside presently.        PHYSICAL EXAM  General:  awake, alert, sitting upright in bed now, pleasant and cooperative, in NAD  HEENT:  conj pink, sclerae anicteric, PERRLA, no oral lesions noted  Neck:  supple, no nodes noted  Heart:  RR  Lungs:  few rales at both bases but no wheezing or rhonchi  Abdomen:  soft, BS+, nontender to palpation  No CVA or Spinal tenderness to palpation  Extremities:  no edema LE's                    swelling of both knees with minimal warmth to touch but no erythema noted, and still with minimal ability to bend both knees and pain with movement  Skin:  warm, dry, no rash noted  External female Catheter in place and draining daniele-colored urine now        I&O's Summary :    16 Aug 2019 07:  -  17 Aug 2019 07:00  --------------------------------------------------------  IN: 350 mL / OUT: 1650 mL / NET: -1300 mL    17 Aug 2019 07:  -  17 Aug 2019 18:24  --------------------------------------------------------  IN: 240 mL / OUT: 0 mL / NET: 240 mL        LABS:  CBC Full  -  ( 17 Aug 2019 07:59 )  WBC Count : 8.37 K/uL  RBC Count : 3.49 M/uL  Hemoglobin : 9.5 g/dL  Hematocrit : 28.3 %  Platelet Count - Automated : 534 K/uL  Mean Cell Volume : 81.1 fl  Mean Cell Hemoglobin : 27.2 pg  Mean Cell Hemoglobin Concentration : 33.6 gm/dL  Auto Neutrophil # : 5.80 K/uL  Auto Lymphocyte # : 1.51 K/uL  Auto Monocyte # : 0.76 K/uL  Auto Eosinophil # : 0.21 K/uL  Auto Basophil # : 0.05 K/uL  Auto Neutrophil % : 69.3 %  Auto Lymphocyte % : 18.0 %  Auto Monocyte % : 9.1 %  Auto Eosinophil % : 2.5 %  Auto Basophil % : 0.6 %        137  |  101  |  10  ----------------------------<  140<H>  3.9   |  26  |  0.74    Ca    9.3      17 Aug 2019 07:59  Phos  3.1       Mg     2.3         TPro  7.8  /  Alb  2.3<L>  /  TBili  0.7  /  DBili  .29<H>  /  AST  47<H>  /  ALT  91<H>  /  AlkPhos  293<H>      LIVER FUNCTIONS - ( 17 Aug 2019 07:59 )  Alb: 2.3 g/dL / Pro: 7.8 gm/dL / ALK PHOS: 293 U/L / ALT: 91 U/L / AST: 47 U/L / GGT: x               Urinalysis Basic - ( 15 Aug 2019 21:18 )  Color: Yellow / Appearance: Slightly Turbid / S.010 / pH: x  Gluc: x / Ketone: Negative  / Bili: Negative / Urobili: Negative mg/dL   Blood: x / Protein: 15 mg/dL / Nitrite: Negative   Leuk Esterase: Trace / RBC: 3-5 /HPF / WBC 3-5   Sq Epi: x / Non Sq Epi: Few / Bacteria: Moderate      CRP - 11.39 (  )      Sedimentation Rate, Erythrocyte: 108 mm/hr ( @ 07:59)        Lipase, Serum: 376 U/L (08-15 @ 18:56)    Lipase, Serum: 116 U/L ( @ 07:28)    Lipase, Serum: 103 U/L ( @ 07:44)        MICROBIOLOGY:  Specimen Source: .Blood ( @ :27)  Culture Results:   No growth to date. ( @ :27)    Specimen Source: .Blood ( @ :)  Culture Results:   No growth to date. ( @ :27)    Specimen Source: .Urine ( @ :18)  Culture Results:   No growth ( @ :18)            Radiology:    < from: Xray Chest 1 View- PORTABLE-Urgent (08.15.19 @ 20:41) >    CLINICAL INFORMATION: Fever.    COMPARISON: 2019.    FINDINGS:    Lungs: There are no lung consolidations.  Heart: The heart is normal in size.  Mediastinum: The mediastinum is within normal limits.    IMPRESSION:    No lung consolidations.          Impression:  Low-grade temp last PM on present ab rx with Zosyn for continued rx of Sepsis secondary to Cholecystitis with Cholelithiasis with recurrent episode of Fever - ? early PNA ?  Repeat Blood and Urine Cx's remain negative thus far. Noted ESR and CRP to be very elevated.      Suggestions:  Will therefore continue current ab rx with Zosyn and follow-up temps and labs.  Consider further w/u with CT Chest and MRI of the Rt. Knee to further evaluate.  Discussed in detail with patient and her daughter at bedside.

## 2019-08-17 NOTE — PROGRESS NOTE ADULT - SUBJECTIVE AND OBJECTIVE BOX
Patient is a 86y old  Female who presents with a chief complaint of Abdominal pain (17 Aug 2019 15:46)      INTERVAL HPI/ OVERNIGHT EVENTS: Pt was seen and examined at bedside today, No significant overnight events, no fevers overnight, pt complains of cough, no SOB, CP, abdominal pain or diarrhea.      MEDICATIONS  (STANDING):  amLODIPine   Tablet 10 milliGRAM(s) Oral daily  atorvastatin 20 milliGRAM(s) Oral at bedtime  dextrose 5%. 1000 milliLiter(s) (50 mL/Hr) IV Continuous <Continuous>  dextrose 50% Injectable 12.5 Gram(s) IV Push once  dextrose 50% Injectable 25 Gram(s) IV Push once  dextrose 50% Injectable 25 Gram(s) IV Push once  enalapril 20 milliGRAM(s) Oral two times a day  heparin  Injectable 5000 Unit(s) SubCutaneous every 12 hours  insulin lispro (HumaLOG) corrective regimen sliding scale   SubCutaneous three times a day before meals  insulin lispro (HumaLOG) corrective regimen sliding scale   SubCutaneous at bedtime  metoprolol tartrate 100 milliGRAM(s) Oral two times a day  pantoprazole    Tablet 40 milliGRAM(s) Oral before breakfast  piperacillin/tazobactam IVPB.. 3.375 Gram(s) IV Intermittent every 8 hours    MEDICATIONS  (PRN):  acetaminophen   Tablet .. 650 milliGRAM(s) Oral every 6 hours PRN Temp greater or equal to 38C (100.4F), Mild Pain (1 - 3)  dextrose 40% Gel 15 Gram(s) Oral once PRN Blood Glucose LESS THAN 70 milliGRAM(s)/deciliter  glucagon  Injectable 1 milliGRAM(s) IntraMuscular once PRN Glucose LESS THAN 70 milligrams/deciliter  loratadine 10 milliGRAM(s) Oral daily PRN allergies  ondansetron Injectable 4 milliGRAM(s) IV Push every 6 hours PRN Nausea      Allergies    dust (Sneezing)  No Known Drug Allergies  POLLEN (Rhinorrhea; Sneezing)    Intolerances        REVIEW OF SYSTEMS:    Unable to examine due to [ ] Encephalopathy [ ] Advanced Dementia [ ] Expressive Aphasia [ ] Non-verbal patient    CONSTITUTIONAL: Positive fever, positive generalized weakness/Fatigue, No weight loss  EYES: No eye pain, visual disturbances, or discharge  ENMT:  No difficulty hearing, tinnitus, vertigo; No sinus or throat pain  NECK: No pain or stiffness  RESPIRATORY: positive cough, No shortness of breath, wheezing, sputum or hemoptysis   CARDIOVASCULAR: No chest pain, palpitations, or leg swelling  GASTROINTESTINAL: No abdominal pain. No nausea, vomiting, diarrhea or constipation. No melena or hematochezia.  GENITOURINARY: No dysuria, frequency, hematuria, or incontinence  NEUROLOGICAL: No headaches, Dizziness, memory loss, loss of strength, numbness, or tremors  SKIN: No itching, burning, rashes, or lesions   MUSCULOSKELETAL: No joint pain or swelling; No muscle, back, or extremity pain  PSYCHIATRIC: No depression, anxiety, mood swings, or difficulty sleeping  HEME/LYMPH: No easy bruising, or bleeding gums        Vital Signs Last 24 Hrs  T(C): 36.8 (17 Aug 2019 11:35), Max: 37.4 (16 Aug 2019 23:42)  T(F): 98.3 (17 Aug 2019 11:35), Max: 99.4 (16 Aug 2019 23:42)  HR: 78 (17 Aug 2019 11:35) (78 - 97)  BP: 142/70 (17 Aug 2019 11:35) (131/74 - 142/70)  BP(mean): --  RR: 17 (17 Aug 2019 11:35) (17 - 18)  SpO2: 99% (17 Aug 2019 11:35) (98% - 99%)    PHYSICAL EXAM:  GENERAL: NAD, well-developed, well-groomed  HEAD:  Atraumatic, Normocephalic  EYES: conjunctiva and sclera clear  ENMT: Moist mucous membranes  NECK: Supple, No JVD, Normal thyroid  CHEST/LUNG: Clear to Auscultation bilaterally; No rales, rhonchi, wheezing, or rubs  HEART: Regular rate and rhythm; No murmurs, rubs, or gallops  ABDOMEN: Soft, Nontender, Nondistended; Bowel sounds present  EXTREMITIES:  2+ Peripheral Pulses, No clubbing, cyanosis, or edema  SKIN: No rashes or lesions  NERVOUS SYSTEM:  Alert & Oriented X3, Good concentration; Motor Strength 5/5 B/L upper and lower extremities        LABS:                        9.5    8.37  )-----------( 534      ( 17 Aug 2019 07:59 )             28.3     08-    137  |  101  |  10  ----------------------------<  140<H>  3.9   |  26  |  0.74    Ca    9.3      17 Aug 2019 07:59  Phos  3.1       Mg     2.3         TPro  7.8  /  Alb  2.3<L>  /  TBili  0.7  /  DBili  .29<H>  /  AST  47<H>  /  ALT  91<H>  /  AlkPhos  293<H>        Urinalysis Basic - ( 15 Aug 2019 21:18 )    Color: Yellow / Appearance: Slightly Turbid / S.010 / pH: x  Gluc: x / Ketone: Negative  / Bili: Negative / Urobili: Negative mg/dL   Blood: x / Protein: 15 mg/dL / Nitrite: Negative   Leuk Esterase: Trace / RBC: 3-5 /HPF / WBC 3-5   Sq Epi: x / Non Sq Epi: Few / Bacteria: Moderate      CAPILLARY BLOOD GLUCOSE      POCT Blood Glucose.: 146 mg/dL (17 Aug 2019 11:41)  POCT Blood Glucose.: 160 mg/dL (17 Aug 2019 07:55)  POCT Blood Glucose.: 192 mg/dL (16 Aug 2019 22:14)        Culture - Blood (collected 19)  Source: .Blood  Preliminary Report (19):    No growth to date.    Culture - Blood (collected 19)  Source: .Blood  Preliminary Report (19):    No growth to date.    Culture - Urine (collected 19)  Source: .Urine  Final Report (19):    No growth        RADIOLOGY & ADDITIONAL TESTS:          Imaging Personally Reviewed:  [ ] YES  [ ] NO    Consultant(s) Notes Reviewed:  [x ] YES  [ ] NO    Care Discussed with Consultants/Other Providers [x ] YES  [ ] NO

## 2019-08-18 LAB
GLUCOSE BLDC GLUCOMTR-MCNC: 129 MG/DL — HIGH (ref 70–99)
GLUCOSE BLDC GLUCOMTR-MCNC: 131 MG/DL — HIGH (ref 70–99)
GLUCOSE BLDC GLUCOMTR-MCNC: 182 MG/DL — HIGH (ref 70–99)
RHEUMATOID FACT SERPL-ACNC: <10 IU/ML — SIGNIFICANT CHANGE UP (ref 0–13)

## 2019-08-18 PROCEDURE — 73721 MRI JNT OF LWR EXTRE W/O DYE: CPT | Mod: 26,RT,76

## 2019-08-18 PROCEDURE — 99233 SBSQ HOSP IP/OBS HIGH 50: CPT

## 2019-08-18 RX ORDER — ACETAMINOPHEN 500 MG
650 TABLET ORAL EVERY 6 HOURS
Refills: 0 | Status: DISCONTINUED | OUTPATIENT
Start: 2019-08-18 | End: 2019-08-20

## 2019-08-18 RX ADMIN — Medication 100 MILLIGRAM(S): at 05:41

## 2019-08-18 RX ADMIN — HEPARIN SODIUM 5000 UNIT(S): 5000 INJECTION INTRAVENOUS; SUBCUTANEOUS at 17:24

## 2019-08-18 RX ADMIN — HEPARIN SODIUM 5000 UNIT(S): 5000 INJECTION INTRAVENOUS; SUBCUTANEOUS at 05:40

## 2019-08-18 RX ADMIN — Medication 100 MILLIGRAM(S): at 17:24

## 2019-08-18 RX ADMIN — Medication 20 MILLIGRAM(S): at 17:24

## 2019-08-18 RX ADMIN — ATORVASTATIN CALCIUM 20 MILLIGRAM(S): 80 TABLET, FILM COATED ORAL at 21:35

## 2019-08-18 RX ADMIN — PANTOPRAZOLE SODIUM 40 MILLIGRAM(S): 20 TABLET, DELAYED RELEASE ORAL at 08:05

## 2019-08-18 RX ADMIN — Medication 1: at 15:54

## 2019-08-18 RX ADMIN — PIPERACILLIN AND TAZOBACTAM 25 GRAM(S): 4; .5 INJECTION, POWDER, LYOPHILIZED, FOR SOLUTION INTRAVENOUS at 05:41

## 2019-08-18 RX ADMIN — Medication 650 MILLIGRAM(S): at 10:10

## 2019-08-18 RX ADMIN — Medication 20 MILLIGRAM(S): at 05:40

## 2019-08-18 RX ADMIN — PIPERACILLIN AND TAZOBACTAM 25 GRAM(S): 4; .5 INJECTION, POWDER, LYOPHILIZED, FOR SOLUTION INTRAVENOUS at 21:36

## 2019-08-18 RX ADMIN — PIPERACILLIN AND TAZOBACTAM 25 GRAM(S): 4; .5 INJECTION, POWDER, LYOPHILIZED, FOR SOLUTION INTRAVENOUS at 14:45

## 2019-08-18 RX ADMIN — Medication 650 MILLIGRAM(S): at 11:10

## 2019-08-18 RX ADMIN — AMLODIPINE BESYLATE 10 MILLIGRAM(S): 2.5 TABLET ORAL at 05:41

## 2019-08-18 NOTE — PROGRESS NOTE ADULT - ASSESSMENT
85 y/o female with history of GI  ulcer presents with right upper quadrant abdominal pain, found to have gall stone.      Fever:   -Lack of infectious symptoms  -no leukocytosis, positive CRP   -ID consult appreciated.   -CT chest done; non-infectious   -will cont w/ Zosyn and f/u  MRI of knee      Cholelithiases  - No choledocholithiasis in MRCP  - HIDA is neg for acute cholecystitis  - GI and Surgery on board   - No Sx planned, outpt lap joaquin    Chronic diastolic congestive heart failure:  - Stable  - Continue Enalapril and metoprolol     DM:  - Insulin SS    Hyponatremia:  - Resolved.     HTN:  - Continue current meds    DVTp: Heparin   Disposition: PATEL

## 2019-08-18 NOTE — PROGRESS NOTE ADULT - SUBJECTIVE AND OBJECTIVE BOX
Patient is a 86y old  Female who presents with a chief complaint of Abdominal pain (17 Aug 2019 23:54)      INTERVAL HPI/ OVERNIGHT EVENTS: Pt was seen and examined at bedside today, No significant overnight events, pt c/o b/l knee pain today, no fever or cough today.      MEDICATIONS  (STANDING):  amLODIPine   Tablet 10 milliGRAM(s) Oral daily  atorvastatin 20 milliGRAM(s) Oral at bedtime  dextrose 5%. 1000 milliLiter(s) (50 mL/Hr) IV Continuous <Continuous>  dextrose 50% Injectable 12.5 Gram(s) IV Push once  dextrose 50% Injectable 25 Gram(s) IV Push once  dextrose 50% Injectable 25 Gram(s) IV Push once  enalapril 20 milliGRAM(s) Oral two times a day  heparin  Injectable 5000 Unit(s) SubCutaneous every 12 hours  insulin lispro (HumaLOG) corrective regimen sliding scale   SubCutaneous three times a day before meals  insulin lispro (HumaLOG) corrective regimen sliding scale   SubCutaneous at bedtime  metoprolol tartrate 100 milliGRAM(s) Oral two times a day  pantoprazole    Tablet 40 milliGRAM(s) Oral before breakfast  piperacillin/tazobactam IVPB.. 3.375 Gram(s) IV Intermittent every 8 hours    MEDICATIONS  (PRN):  acetaminophen   Tablet .. 650 milliGRAM(s) Oral every 6 hours PRN Temp greater or equal to 38C (100.4F),  acetaminophen   Tablet .. 650 milliGRAM(s) Oral every 6 hours PRN Mild Pain (1 - 3)  dextrose 40% Gel 15 Gram(s) Oral once PRN Blood Glucose LESS THAN 70 milliGRAM(s)/deciliter  glucagon  Injectable 1 milliGRAM(s) IntraMuscular once PRN Glucose LESS THAN 70 milligrams/deciliter  loratadine 10 milliGRAM(s) Oral daily PRN allergies  ondansetron Injectable 4 milliGRAM(s) IV Push every 6 hours PRN Nausea      Allergies    dust (Sneezing)  No Known Drug Allergies  POLLEN (Rhinorrhea; Sneezing)    Intolerances        REVIEW OF SYSTEMS:    Unable to examine due to [ ] Encephalopathy [ ] Advanced Dementia [ ] Expressive Aphasia [ ] Non-verbal patient    CONSTITUTIONAL: No fever, positive generalized weakness/Fatigue, No weight loss  EYES: No eye pain, visual disturbances, or discharge  ENMT:  No difficulty hearing, tinnitus, vertigo; No sinus or throat pain  NECK: No pain or stiffness  RESPIRATORY: No cough, No shortness of breath, wheezing, sputum or hemoptysis   CARDIOVASCULAR: No chest pain, palpitations, or leg swelling  GASTROINTESTINAL: No abdominal pain. No nausea, vomiting, diarrhea or constipation. No melena or hematochezia.  GENITOURINARY: No dysuria, frequency, hematuria, or incontinence  NEUROLOGICAL: No headaches, Dizziness, memory loss, loss of strength, numbness, or tremors  SKIN: No itching, burning, rashes, or lesions   MUSCULOSKELETAL: bilateral knee pain   PSYCHIATRIC: No depression, anxiety, mood swings, or difficulty sleeping  HEME/LYMPH: No easy bruising, or bleeding gums          Vital Signs Last 24 Hrs  T(C): 36.3 (18 Aug 2019 05:19), Max: 37.6 (17 Aug 2019 23:46)  T(F): 97.4 (18 Aug 2019 05:19), Max: 99.7 (17 Aug 2019 23:46)  HR: 71 (18 Aug 2019 05:19) (71 - 97)  BP: 132/73 (18 Aug 2019 05:19) (125/65 - 152/74)  BP(mean): --  RR: 18 (18 Aug 2019 05:19) (17 - 18)  SpO2: 93% (18 Aug 2019 05:19) (93% - 99%)    PHYSICAL EXAM:  GENERAL: NAD, well-developed, well-groomed  HEAD:  Atraumatic, Normocephalic  EYES: conjunctiva and sclera clear  ENMT: Moist mucous membranes  NECK: Supple, No JVD, Normal thyroid  CHEST/LUNG: Clear to Auscultation bilaterally; No rales, rhonchi, wheezing, or rubs  HEART: Regular rate and rhythm; No murmurs, rubs, or gallops  ABDOMEN: Soft, Nontender, Nondistended; Bowel sounds present  EXTREMITIES:  2+ Peripheral Pulses, No clubbing, cyanosis, or edema  SKIN: No rashes or lesions  NERVOUS SYSTEM:  Alert & Oriented X3, Good concentration; Motor Strength 5/5 B/L upper and lower extremities      LABS:                        9.5    8.37  )-----------( 534      ( 17 Aug 2019 07:59 )             28.3     08-17    137  |  101  |  10  ----------------------------<  140<H>  3.9   |  26  |  0.74    Ca    9.3      17 Aug 2019 07:59  Phos  3.1     08-17  Mg     2.3     08-17    TPro  7.8  /  Alb  2.3<L>  /  TBili  0.7  /  DBili  .29<H>  /  AST  47<H>  /  ALT  91<H>  /  AlkPhos  293<H>  08-17        CAPILLARY BLOOD GLUCOSE      POCT Blood Glucose.: 131 mg/dL (18 Aug 2019 08:03)  POCT Blood Glucose.: 159 mg/dL (17 Aug 2019 21:21)  POCT Blood Glucose.: 144 mg/dL (17 Aug 2019 16:21)  POCT Blood Glucose.: 146 mg/dL (17 Aug 2019 11:41)        Culture - Blood (collected 08-16-19)  Source: .Blood  Preliminary Report (08-17-19):    No growth to date.    Culture - Blood (collected 08-16-19)  Source: .Blood  Preliminary Report (08-17-19):    No growth to date.    Culture - Urine (collected 08-16-19)  Source: .Urine  Final Report (08-17-19):    No growth        RADIOLOGY & ADDITIONAL TESTS:          Imaging Personally Reviewed:  [ ] YES  [ ] NO    Consultant(s) Notes Reviewed:  [ x] YES  [ ] NO    Care Discussed with Consultants/Other Providers [x ] YES  [ ] NO

## 2019-08-18 NOTE — PROGRESS NOTE ADULT - SUBJECTIVE AND OBJECTIVE BOX
Pt stable - on IV zosyn  No abd pain  LFT's improving      MEDICATIONS  (STANDING):  amLODIPine   Tablet 10 milliGRAM(s) Oral daily  atorvastatin 20 milliGRAM(s) Oral at bedtime  dextrose 5%. 1000 milliLiter(s) (50 mL/Hr) IV Continuous <Continuous>  dextrose 50% Injectable 12.5 Gram(s) IV Push once  dextrose 50% Injectable 25 Gram(s) IV Push once  dextrose 50% Injectable 25 Gram(s) IV Push once  enalapril 20 milliGRAM(s) Oral two times a day  heparin  Injectable 5000 Unit(s) SubCutaneous every 12 hours  insulin lispro (HumaLOG) corrective regimen sliding scale   SubCutaneous three times a day before meals  insulin lispro (HumaLOG) corrective regimen sliding scale   SubCutaneous at bedtime  metoprolol tartrate 100 milliGRAM(s) Oral two times a day  pantoprazole    Tablet 40 milliGRAM(s) Oral before breakfast  piperacillin/tazobactam IVPB.. 3.375 Gram(s) IV Intermittent every 8 hours    MEDICATIONS  (PRN):  acetaminophen   Tablet .. 650 milliGRAM(s) Oral every 6 hours PRN Temp greater or equal to 38C (100.4F),  acetaminophen   Tablet .. 650 milliGRAM(s) Oral every 6 hours PRN Mild Pain (1 - 3)  dextrose 40% Gel 15 Gram(s) Oral once PRN Blood Glucose LESS THAN 70 milliGRAM(s)/deciliter  glucagon  Injectable 1 milliGRAM(s) IntraMuscular once PRN Glucose LESS THAN 70 milligrams/deciliter  loratadine 10 milliGRAM(s) Oral daily PRN allergies  ondansetron Injectable 4 milliGRAM(s) IV Push every 6 hours PRN Nausea      Allergies    dust (Sneezing)  No Known Drug Allergies  POLLEN (Rhinorrhea; Sneezing)    Intolerances        Vital Signs Last 24 Hrs  T(C): 36.6 (18 Aug 2019 16:40), Max: 37.6 (17 Aug 2019 23:46)  T(F): 97.8 (18 Aug 2019 16:40), Max: 99.7 (17 Aug 2019 23:46)  HR: 79 (18 Aug 2019 17:24) (71 - 88)  BP: 139/68 (18 Aug 2019 17:24) (119/62 - 148/86)  BP(mean): --  RR: 16 (18 Aug 2019 16:40) (16 - 18)  SpO2: 98% (18 Aug 2019 16:40) (93% - 100%)    PHYSICAL EXAM:  General: NAD.  CVS: S1, S2  Chest: air entry bilaterally present  Abd: BS present, soft, non-tender      LABS:                        9.5    8.37  )-----------( 534      ( 17 Aug 2019 07:59 )             28.3     08-17    137  |  101  |  10  ----------------------------<  140<H>  3.9   |  26  |  0.74    Ca    9.3      17 Aug 2019 07:59  Phos  3.1     08-17  Mg     2.3     08-17    TPro  7.8  /  Alb  2.3<L>  /  TBili  0.7  /  DBili  .29<H>  /  AST  47<H>  /  ALT  91<H>  /  AlkPhos  293<H>  08-17    Stable from GI standpoint  please re-consult GI if needed

## 2019-08-19 LAB
ANION GAP SERPL CALC-SCNC: 8 MMOL/L — SIGNIFICANT CHANGE UP (ref 5–17)
BASOPHILS # BLD AUTO: 0.06 K/UL — SIGNIFICANT CHANGE UP (ref 0–0.2)
BASOPHILS NFR BLD AUTO: 0.8 % — SIGNIFICANT CHANGE UP (ref 0–2)
BUN SERPL-MCNC: 13 MG/DL — SIGNIFICANT CHANGE UP (ref 7–23)
CALCIUM SERPL-MCNC: 9.4 MG/DL — SIGNIFICANT CHANGE UP (ref 8.5–10.1)
CHLORIDE SERPL-SCNC: 96 MMOL/L — SIGNIFICANT CHANGE UP (ref 96–108)
CO2 SERPL-SCNC: 24 MMOL/L — SIGNIFICANT CHANGE UP (ref 22–31)
CREAT SERPL-MCNC: 0.79 MG/DL — SIGNIFICANT CHANGE UP (ref 0.5–1.3)
CRP SERPL-MCNC: 7.52 MG/DL — HIGH (ref 0–0.4)
EOSINOPHIL # BLD AUTO: 0.26 K/UL — SIGNIFICANT CHANGE UP (ref 0–0.5)
EOSINOPHIL NFR BLD AUTO: 3.5 % — SIGNIFICANT CHANGE UP (ref 0–6)
GLUCOSE BLDC GLUCOMTR-MCNC: 121 MG/DL — HIGH (ref 70–99)
GLUCOSE BLDC GLUCOMTR-MCNC: 124 MG/DL — HIGH (ref 70–99)
GLUCOSE BLDC GLUCOMTR-MCNC: 135 MG/DL — HIGH (ref 70–99)
GLUCOSE BLDC GLUCOMTR-MCNC: 151 MG/DL — HIGH (ref 70–99)
GLUCOSE SERPL-MCNC: 118 MG/DL — HIGH (ref 70–99)
HCT VFR BLD CALC: 25.6 % — LOW (ref 34.5–45)
HGB BLD-MCNC: 8.7 G/DL — LOW (ref 11.5–15.5)
IMM GRANULOCYTES NFR BLD AUTO: 0.9 % — SIGNIFICANT CHANGE UP (ref 0–1.5)
LYMPHOCYTES # BLD AUTO: 2.39 K/UL — SIGNIFICANT CHANGE UP (ref 1–3.3)
LYMPHOCYTES # BLD AUTO: 31.7 % — SIGNIFICANT CHANGE UP (ref 13–44)
MCHC RBC-ENTMCNC: 27.9 PG — SIGNIFICANT CHANGE UP (ref 27–34)
MCHC RBC-ENTMCNC: 34 GM/DL — SIGNIFICANT CHANGE UP (ref 32–36)
MCV RBC AUTO: 82.1 FL — SIGNIFICANT CHANGE UP (ref 80–100)
MONOCYTES # BLD AUTO: 0.71 K/UL — SIGNIFICANT CHANGE UP (ref 0–0.9)
MONOCYTES NFR BLD AUTO: 9.4 % — SIGNIFICANT CHANGE UP (ref 2–14)
NEUTROPHILS # BLD AUTO: 4.04 K/UL — SIGNIFICANT CHANGE UP (ref 1.8–7.4)
NEUTROPHILS NFR BLD AUTO: 53.7 % — SIGNIFICANT CHANGE UP (ref 43–77)
NRBC # BLD: 0 /100 WBCS — SIGNIFICANT CHANGE UP (ref 0–0)
PLATELET # BLD AUTO: 576 K/UL — HIGH (ref 150–400)
POTASSIUM SERPL-MCNC: 4.3 MMOL/L — SIGNIFICANT CHANGE UP (ref 3.5–5.3)
POTASSIUM SERPL-SCNC: 4.3 MMOL/L — SIGNIFICANT CHANGE UP (ref 3.5–5.3)
RBC # BLD: 3.12 M/UL — LOW (ref 3.8–5.2)
RBC # FLD: 14.6 % — HIGH (ref 10.3–14.5)
SODIUM SERPL-SCNC: 128 MMOL/L — LOW (ref 135–145)
WBC # BLD: 7.53 K/UL — SIGNIFICANT CHANGE UP (ref 3.8–10.5)
WBC # FLD AUTO: 7.53 K/UL — SIGNIFICANT CHANGE UP (ref 3.8–10.5)

## 2019-08-19 PROCEDURE — 73562 X-RAY EXAM OF KNEE 3: CPT | Mod: 26,LT,RT

## 2019-08-19 PROCEDURE — 99233 SBSQ HOSP IP/OBS HIGH 50: CPT

## 2019-08-19 RX ORDER — KETOROLAC TROMETHAMINE 30 MG/ML
15 SYRINGE (ML) INJECTION ONCE
Refills: 0 | Status: DISCONTINUED | OUTPATIENT
Start: 2019-08-19 | End: 2019-08-19

## 2019-08-19 RX ADMIN — HEPARIN SODIUM 5000 UNIT(S): 5000 INJECTION INTRAVENOUS; SUBCUTANEOUS at 05:31

## 2019-08-19 RX ADMIN — Medication 100 MILLIGRAM(S): at 17:08

## 2019-08-19 RX ADMIN — ATORVASTATIN CALCIUM 20 MILLIGRAM(S): 80 TABLET, FILM COATED ORAL at 21:21

## 2019-08-19 RX ADMIN — Medication 650 MILLIGRAM(S): at 23:21

## 2019-08-19 RX ADMIN — PIPERACILLIN AND TAZOBACTAM 25 GRAM(S): 4; .5 INJECTION, POWDER, LYOPHILIZED, FOR SOLUTION INTRAVENOUS at 13:02

## 2019-08-19 RX ADMIN — PIPERACILLIN AND TAZOBACTAM 25 GRAM(S): 4; .5 INJECTION, POWDER, LYOPHILIZED, FOR SOLUTION INTRAVENOUS at 05:31

## 2019-08-19 RX ADMIN — PIPERACILLIN AND TAZOBACTAM 25 GRAM(S): 4; .5 INJECTION, POWDER, LYOPHILIZED, FOR SOLUTION INTRAVENOUS at 21:21

## 2019-08-19 RX ADMIN — HEPARIN SODIUM 5000 UNIT(S): 5000 INJECTION INTRAVENOUS; SUBCUTANEOUS at 17:08

## 2019-08-19 RX ADMIN — AMLODIPINE BESYLATE 10 MILLIGRAM(S): 2.5 TABLET ORAL at 05:36

## 2019-08-19 RX ADMIN — Medication 20 MILLIGRAM(S): at 17:08

## 2019-08-19 RX ADMIN — PANTOPRAZOLE SODIUM 40 MILLIGRAM(S): 20 TABLET, DELAYED RELEASE ORAL at 07:58

## 2019-08-19 NOTE — CONSULT NOTE ADULT - ASSESSMENT
A/P: 86M with B/L knee pain septic joint vs OA vs inflammatory arthropathy    - Given knee effusion, inability to bear weight, limited ROM, elevated ESR/CRP R knee joint aspiration to be performed to evaluate effusion  - ROM in L knee adequate with minimal pain; low concern for septic joint  - Surgical intervention pending joint aspiration results  - Procedure to be discussed with patient's daughter who will be here today and consent to be obtained  - Pain control, WBAT  - D/w Dr. Mojica, will advise if plan changes    Adama Rodriguez, DO PGY1  Orthopaedic Surgery  Pager: [phone] A/P: 86M with B/L knee pain septic joint vs OA vs inflammatory arthropathy    - Given knee effusion, inability to bear weight, limited ROM, elevated ESR/CRP R knee joint aspiration was performed  -Given imaging MR Finding and XR consistent with Severe OA of BL Knees. Low suspicion for septic R Knee at this time.   -Pt currently afebrile  -Pt On Iv Abx for cholecystitis. Gen Surg Rec Outpatient FU for possible Lap Yaneth   -Pt reports BL Knee Pain, however based on clinical/physical exam, R knee has significantly more pain with ROM than the left Knee  - ROM in L knee adequate with minimal pain; low concern for septic joint  -R Knee Aspiration was performed at bedside. Procedure was explained in detail to patient and daughter  -Will FU Gram stain/Culutre/Crystals/Cell count for R Knee Apsiration  -Ordered Toradol IV stat x 1  -Will Monitor for improvement s/p Toradol. No Hx of Gout  -Low suspicion for R Septic Knee at this time. L Knee without significant pain with ROM.  -BL Knee pain most likely due to OA and Arthritic Flair  -Will FU R Knee Aspiration results and advise if plan changes  - Pain control, WBAT  - D/w Dr. Mojica, will advise if plan changes    Adama Rodriguez, DO PGY1  Orthopaedic Surgery  Pager: [phone] A/P: 86M with B/L knee pain septic joint vs OA vs inflammatory arthropathy    - Given knee effusion, inability to bear weight, limited ROM, elevated ESR/CRP R knee joint aspiration was performed  -Given imaging MR Finding and XR consistent with Severe OA of BL Knees. Low suspicion for septic R Knee at this time.   -Pt currently afebrile  -Pt On Iv Abx for cholecystitis. Gen Surg Rec Outpatient FU for possible Lap Yaneth   -Pt reports BL Knee Pain, however based on clinical/physical exam, R knee has significantly more pain with ROM than the left Knee  - ROM in L knee adequate with minimal pain; low concern for septic joint  -R Knee Aspiration was performed at bedside. Procedure was explained in detail to patient and daughter  -Will FU Gram stain/Culutre/Crystals/Cell count for R Knee Apsiration  -Ordered Toradol IV stat x 1  -Will Monitor for improvement s/p Toradol. No Hx of Gout  -Low suspicion for R Septic Knee at this time. L Knee without significant pain with ROM.  -Will Keep pt NPO for now until Cell Count Results Performed  -BL Knee pain most likely due to OA and Arthritic Flair  -Will FU R Knee Aspiration results and advise if plan changes  - Pain control, WBAT  - D/w Dr. Mojica, will advise if plan changes    Adama Rodriguez DO PGY1  Orthopaedic Surgery  Pager: [phone]

## 2019-08-19 NOTE — CONSULT NOTE ADULT - SUBJECTIVE AND OBJECTIVE BOX
Ortho Consult Note    86M admitted with abdominal pain, vomiting consulted for B/L knee pain. Pt has had B/L knee pain for 1 month, worst over the last week where he is now unable to walk due to the pain. Pt states he has not had knee pain prior to this episode. Denies fever/chills, numbness/tingling.    PAST MEDICAL & SURGICAL HISTORY:  Osteoarthritis  Chronic hip pain  Chronic shoulder pain  Hyperlipemia  DM (diabetes mellitus)  HTN (hypertension)  No significant past surgical history    Allergies    dust (Sneezing)  No Known Drug Allergies  POLLEN (Rhinorrhea; Sneezing)    Intolerances    Vital Signs Last 24 Hrs  T(C): 36.6 (19 Aug 2019 11:16), Max: 36.6 (19 Aug 2019 11:16)  T(F): 97.8 (19 Aug 2019 11:16), Max: 97.8 (19 Aug 2019 11:16)  HR: 77 (19 Aug 2019 11:16) (61 - 79)  BP: 120/57 (19 Aug 2019 11:16) (120/57 - 139/68)  BP(mean): --  RR: 16 (19 Aug 2019 11:16) (16 - 17)  SpO2: 99% (19 Aug 2019 11:16) (98% - 99%)                          8.7    7.53  )-----------( 576      ( 19 Aug 2019 06:04 )             25.6     08-19    128<L>  |  96  |  13  ----------------------------<  118<H>  4.3   |  24  |  0.79    Ca    9.4      19 Aug 2019 06:04    ESR - 109  CRP - 7.52    Physical Exam  Gen: NAD    RLE:  Skin intact, warm well perfused  + knee effusion  SILT L3-S1  + EHL/FHL/TA/GSC  + DP/PT pulses  Knee ROM 0-30 with pain  Compartments soft, compressible    LLE:  Skin intact, warm well perfused  + knee effusion  SILT L3-S1  + EHL/FHL/TA/HSC  + DP/PT pulses  Knee ROM 0-100 with pain  Compartments soft, compressible    Imaging:  MRI L knee: large joint effusion with synovitis; complex bakers cyst  MRI R knee: large joint effusion with synovitis; small bakers cyst Ortho Consult Note    86M admitted with abdominal pain, vomiting and being worked up for cholelithiases, consulted for B/L knee pain. Pt has a history of BL knee pain which has gotten worst over the few days where he is now unable to walk due to the pain. PT denies any recent trauma or falls. Pt and daughter at bedside report pt ambulated with cane/walker as needed. Pt states he has not had knee pain prior to this episode. Denies fever/chills, numbness/tingling.    PAST MEDICAL & SURGICAL HISTORY:  Osteoarthritis  Chronic hip pain  Chronic shoulder pain  Hyperlipemia  DM (diabetes mellitus)  HTN (hypertension)  No significant past surgical history    Allergies    dust (Sneezing)  No Known Drug Allergies  POLLEN (Rhinorrhea; Sneezing)    Intolerances    Vital Signs Last 24 Hrs  T(C): 36.6 (19 Aug 2019 11:16), Max: 36.6 (19 Aug 2019 11:16)  T(F): 97.8 (19 Aug 2019 11:16), Max: 97.8 (19 Aug 2019 11:16)  HR: 77 (19 Aug 2019 11:16) (61 - 79)  BP: 120/57 (19 Aug 2019 11:16) (120/57 - 139/68)  BP(mean): --  RR: 16 (19 Aug 2019 11:16) (16 - 17)  SpO2: 99% (19 Aug 2019 11:16) (98% - 99%)                          8.7    7.53  )-----------( 576      ( 19 Aug 2019 06:04 )             25.6     08-19    128<L>  |  96  |  13  ----------------------------<  118<H>  4.3   |  24  |  0.79    Ca    9.4      19 Aug 2019 06:04    ESR - 109  CRP - 7.52    Physical Exam  Gen: NAD    RLE:  Skin intact, warm well perfused  + moderate knee effusion  No increased warmth of the R Knee   Non TTP over Patella  TTP over medial/lateral tibial joint lines   SILT L3-S1  + EHL/FHL/TA/GSC  + DP/PT pulses  Knee ROM 0-30 with pain    Compartments soft, compressible    LLE:  Skin intact, warm well perfused  + mild knee effusion  NO increased warmth Knee joint  Non TTP over patella  Non TTP over Medial/Lateral Tibia joint line   SILT L3-S1  + EHL/FHL/TA/HSC  + DP/PT pulses  Knee passive ROM 0-40 without pain  Knee passive ROM  with pain  Compartments soft, compressible    Imaging:  MRI L knee: large joint effusion with synovitis; complex bakers cyst  MRI R knee: large joint effusion with synovitis; small bakers cyst Ortho Consult Note    86M admitted with abdominal pain, vomiting and being worked up for cholelithiases, consulted for B/L knee pain. Pt has a history of BL knee pain which has gotten worst over the few days where he is now unable to walk due to the pain. PT denies any recent trauma or falls. Pt and daughter at bedside report pt ambulated with cane/walker as needed. Pt states he has not had knee pain prior to this episode. Denies fever/chills, numbness/tingling.    PAST MEDICAL & SURGICAL HISTORY:  Osteoarthritis  Chronic hip pain  Chronic shoulder pain  Hyperlipemia  DM (diabetes mellitus)  HTN (hypertension)  No significant past surgical history    Allergies    dust (Sneezing)  No Known Drug Allergies  POLLEN (Rhinorrhea; Sneezing)    Intolerances    Vital Signs Last 24 Hrs  T(C): 36.6 (19 Aug 2019 11:16), Max: 36.6 (19 Aug 2019 11:16)  T(F): 97.8 (19 Aug 2019 11:16), Max: 97.8 (19 Aug 2019 11:16)  HR: 77 (19 Aug 2019 11:16) (61 - 79)  BP: 120/57 (19 Aug 2019 11:16) (120/57 - 139/68)  BP(mean): --  RR: 16 (19 Aug 2019 11:16) (16 - 17)  SpO2: 99% (19 Aug 2019 11:16) (98% - 99%)                          8.7    7.53  )-----------( 576      ( 19 Aug 2019 06:04 )             25.6     08-19    128<L>  |  96  |  13  ----------------------------<  118<H>  4.3   |  24  |  0.79    Ca    9.4      19 Aug 2019 06:04    ESR - 109  CRP - 7.52    Physical Exam  Gen: NAD    RLE:  Skin intact, warm well perfused  + moderate knee effusion  No increased warmth of the R Knee   Non TTP over Patella  TTP over medial/lateral tibial joint lines   SILT L3-S1  + EHL/FHL/TA/GSC  + DP/PT pulses  Knee ROM 0-30 with pain    Compartments soft, compressible    LLE:  Skin intact, warm well perfused  + mild knee effusion  NO increased warmth Knee joint  Non TTP over patella  Non TTP over Medial/Lateral Tibia joint line   SILT L3-S1  + EHL/FHL/TA/HSC  + DP/PT pulses  Knee passive ROM 0-40 without pain  Knee passive ROM  with pain  Compartments soft, compressible    Imaging:  MRI L knee: large joint effusion with synovitis; complex bakers cyst  MRI R knee: large joint effusion with synovitis; small bakers cyst    Procedure: R Knee joint aspiration  Procedure explained in detail to patient and daughter at Bedside. R Knee was prepped sterile fashion. 19 gauge Needle was inserted into R Knee joint, 20cc serous anginous fluid was removed. Patient tolerated procedure well. Pt was stable post procedure without complications.

## 2019-08-19 NOTE — PROGRESS NOTE ADULT - SUBJECTIVE AND OBJECTIVE BOX
TMAX - 97.8    On day # 12 Zosyn    Vital Signs Last 24 Hrs  T(C): 36.6 (19 Aug 2019 11:16), Max: 36.6 (18 Aug 2019 16:40)  T(F): 97.8 (19 Aug 2019 11:16), Max: 97.8 (18 Aug 2019 16:40)  HR: 77 (19 Aug 2019 11:16) (61 - 88)  BP: 120/57 (19 Aug 2019 11:16) (120/57 - 148/86)  BP(mean): --  RR: 16 (19 Aug 2019 11:16) (16 - 17)  SpO2: 99% (19 Aug 2019 11:16) (98% - 99%)  Supplemental O2: on RA      Awake, alert, no c/o cp or SOB and cough has decreased now.  Still with c/o pain and stiffness both knees with very limited mobility.  No further abdominal pain now and tolerating po diet without difficulty.       PHYSICAL EXAM  General:  awake, alert, sitting upright in bed, pleasant and cooperative, in NAD now  HEENT:  conj pink, sclerae anicteric, PERRLA, no oral lesions noted  Neck:  supple, no nodes noted  Heart:  RR  Lungs: clear bilaterally now   Abdomen:  soft, BS+, nontender to palpation  No CVA or Spinal tenderness elicited to palpation  Extremities:  no edema LE's                     both knees remain with swelling and limited ROM, no erythema noted and minimal warmth to touch  Skin:  warm, dry, no rash noted  External Female Catheter in place and draining clear daniele-colored urin        I&O's Summary :    18 Aug 2019 07:01  -  19 Aug 2019 07:00  --------------------------------------------------------  IN: 200 mL / OUT: 700 mL / NET: -500 mL    19 Aug 2019 07:01  -  19 Aug 2019 15:57  --------------------------------------------------------  IN: 0 mL / OUT: 800 mL / NET: -800 mL        LABS:  CBC Full  -  ( 19 Aug 2019 06:04 )  WBC Count : 7.53 K/uL  RBC Count : 3.12 M/uL  Hemoglobin : 8.7 g/dL  Hematocrit : 25.6 %  Platelet Count - Automated : 576 K/uL  Mean Cell Volume : 82.1 fl  Mean Cell Hemoglobin : 27.9 pg  Mean Cell Hemoglobin Concentration : 34.0 gm/dL  Auto Neutrophil # : 4.04 K/uL  Auto Lymphocyte # : 2.39 K/uL  Auto Monocyte # : 0.71 K/uL  Auto Eosinophil # : 0.26 K/uL  Auto Basophil # : 0.06 K/uL  Auto Neutrophil % : 53.7 %  Auto Lymphocyte % : 31.7 %  Auto Monocyte % : 9.4 %  Auto Eosinophil % : 3.5 %  Auto Basophil % : 0.8 %    08-19    128<L>  |  96  |  13  ----------------------------<  118<H>  4.3   |  24  |  0.79    Ca    9.4      19 Aug 2019 06:04        Acute Hepatitis Panel (08.17.19 @ 12:47)    Hepatitis C Virus Interpretation: Nonreact: Hepatitis C AB  S/CO Ratio                        Interpretation  < 1.00                                   Non-Reactive  1.00 - 4.99                         Weakly-Reactive  >= 5.00                                Reactive  Non-Reactive: A person witha non-reactive HCV antibody result is  considered uninfected.  No further action is needed unless recent  infection is suspected.  In these cases, consider repeat testing later to  detect seroconversion..  Weakly-Reactive: HCV antibody test is abnormal, HCV RNA Qualitative test  will follow.  Reactive: HCV antibody test is abnormal, HCV RNA Qualitative test will  follow.  Note: HCV antibody testing is performed on the Abbott  system.    Hepatitis C Virus S/CO Ratio: 0.31 S/CO    Hepatitis B Core IgM Antibody: Nonreact    Hepatitis B Surface Antigen: Nonreact    Hepatitis A IgM Antibody: Nonreact      Hepatitis B Surface Antibody (08.17.19 @ 12:47)    Hepatitis B Surface Antibody: Nonreact    MATTY - pending    Rheumatoid Factor Quant, Serum or Plasma: <10 IU/mL (08-18 @ 12:08)    Sedimentation Rate, Erythrocyte: 108 mm/hr (08-17 @ 07:59)    CRP - 7.53 ( 8/19/19)      Lipase, Serum: 376 U/L (08-15 @ 18:56)        MICROBIOLOGY:  Specimen Source: .Blood (08-16 @ 01:27)  Culture Results:   No growth to date. (08-16 @ 01:27)    Specimen Source: .Blood (08-16 @ 01:27)  Culture Results:   No growth to date. (08-16 @ 01:27)    Specimen Source: .Urine (08-16 @ 01:18)  Culture Results:   No growth (08-16 @ 01:18)            Radiology:    < from: MR Knee No Cont, Left (08.18.19 @ 13:18) >  EXAM:  MR KNEE LT                          PROCEDURE DATE:  08/18/2019      INTERPRETATION:    MRI OF THE LEFT KNEE    CLINICAL INDICATION: Knee pain and swelling. Indeterminant complex lesion   visualized on ultrasound.  TECHNIQUE: Multiplanar, Multisequence MRI was obtained of the left knee.    FINDINGS:    CRUCIATE AND COLLATERAL LIGAMENTS: Anterior cruciate ligament is not   visualized. Thickening of the posterior cruciate ligament. Collateral   ligaments are intact.  MEDIAL COMPARTMENT: Meniscus is diffusely degenerated and torn. Diffuse   broad-based full-thickness chondral loss with subchondral edema with   joint remodeling and osteophyte formation.  LATERAL COMPARTMENT: Degeneration of the posterior root insertion and   anterior horn. High-grade partial-thickness chondral loss with large   osteophyte formation. The lateral femoral condyle appears mildly   posteriorly translated with respect to the lateral tibial plateau.  PATELLOFEMORAL COMPARTMENT: Low-grade chondral wear. Marginal osteophytes.  EXTENSOR MECHANISM: Quadriceps and patellar tendons are intact.  SYNOVIUM/ JOINT FLUID: Large joint effusion with synovitis and   debris/articular bodies. Large complex loculated Baker's cyst with fluid   tracking along the superficial myofascial plane of the medial   gastrocnemius, indicative of leakage/partial rupture.  BONE MARROW: Diffuse marrow edema, most notably within the tibial   plateaus.  MUSCLES: No muscle edema or high-grade atrophy.  NEUROVASCULAR STRUCTURES:The neurovascular structures are unremarkable.  SUBCUTANEOUS SOFT TISSUES: The cutaneous tissues are intact.    IMPRESSION:   Severe medial and lateral compartment osteoarthrosis. Lateral femoral   condyle appears posteriorly translated with respect to the lateral tibial   plateau. Osseous edema without fracture within the medial lateral tibial   plateaus.  Large joint effusion with synovitis and debris as well as large loculated   complex Baker's cyst with fluid tracking along the superficial myofascial   plane of the medial head of the gastrocnemius, indicative of   leakage/partial rupture.                                                           < from: MR Knee No Cont, Right (08.18.19 @ 12:50) >  EXAM:  MR KNEE RT                          PROCEDURE DATE:  08/18/2019      INTERPRETATION:    MRI OF THE RIGHT KNEE    CLINICAL INDICATION: Popliteal cyst. Right calf pain and tenderness.  TECHNIQUE: Multiplanar, Multisequence MRI was obtained of the right knee.    FINDINGS:    CRUCIATE AND COLLATERAL LIGAMENTS: The anterior cruciate ligament is not   well visualized. The posterior cruciate ligament is intact. The medial   lateral collateral ligaments are intact.  MEDIAL COMPARTMENT: Complex tearing of the posterior horn of the medial   meniscus. Broad-based full-thickness chondral loss with osseous edema and   osteophytosis.  LATERAL COMPARTMENT: Lateral meniscus is grossly intact. Diffuse   high-grade partial-thickness chondral loss. Edema within the lateral   tibial plateau. Osteophyte formation.  PATELLOFEMORAL COMPARTMENT: High-grade chondral loss along the median   ridge and medial patellar facet.  EXTENSOR MECHANISM: Quadriceps and patellar tendons are intact.  SYNOVIUM/ JOINT FLUID: Joint effusion and synovitis. There is a small   Baker's cyst with fluid tracking along the superficial myofascial plane   of the medial gastrocnemius, consistent with leakage/partial rupture.  BONE MARROW: No acute fracture or osteonecrosis. The medial and lateral   compartment subchondral edema, degenerative.  MUSCLES: No muscle edema or high-grade atrophy.  NEUROVASCULAR STRUCTURES: Course of the neurovascular structures are   unremarkable.  SUBCUTANEOUS SOFT TISSUES: Subcutaneous tissues are intact.    IMPRESSION:   Moderate lateral and patellofemoral and severe medial compartment   osteoarthrosis.  Large joint effusion and synovitis. Small Baker's cyst with fluid   tracking along the superficial myofascial plane of the medial   gastrocnemius, indicative of leakage/partial rupture.                       < from: CT Chest No Cont (08.17.19 @ 18:49) >  CT Chest Without Contrast     EXAM DATE/TIME:   8/17/2019 6:36 PM     CLINICAL HISTORY:   86 years old, female; Cough and fever     TECHNIQUE:   Imaging protocol: Axial computed tomography images of the chest without   intravenous contrast. Coronal and sagittal reformatted images were   created and   reviewed.     COMPARISON:   DX XR CHEST URGENT 8/15/2019 8:17 PM, CT 8/29/2017    FINDINGS:   Lungs: Central tracheobronchial tree is grossly patent. Minimal dependent   ground glass and interstitial opacities with scattered atelectasis or   scar. No acute consolidation.   Pleural space: No pneumothorax. No pleural effusion.   Heart: Severe coronary artery calcification. Heart enlarged in size.   Minimal pericardial effusion.   Aorta: Ectasia of the ascending aorta. Calcified plaque of the aorta.   Lymph nodes:. No enlarged lymph nodes.   Bones/joints: Bones osteopenic with degenerative changes. Old right rib   fractures.   Soft tissues: Unremarkable.   Gallbladder and bile ducts: Gallstones  Kidneys and ureters: Left renal cyst.   Liver: Hypodense lesion in the medial hepatic dome unchanged.    IMPRESSION:   1. Minimal bibasilar atelectasis and dependent lung changes.. No acute   consolidation.   2. Minimal pericardial effusion.   3. Other findings as described above.          Impression:  Temps remain decreased on present ab rx with Zosyn for continued rx of Sepsis due to Acute Cholecystitis with Cholelithiasis          Suggestions: TMAX - 97.8    On day # 12 Zosyn    Vital Signs Last 24 Hrs  T(C): 36.6 (19 Aug 2019 11:16), Max: 36.6 (18 Aug 2019 16:40)  T(F): 97.8 (19 Aug 2019 11:16), Max: 97.8 (18 Aug 2019 16:40)  HR: 77 (19 Aug 2019 11:16) (61 - 88)  BP: 120/57 (19 Aug 2019 11:16) (120/57 - 148/86)  BP(mean): --  RR: 16 (19 Aug 2019 11:16) (16 - 17)  SpO2: 99% (19 Aug 2019 11:16) (98% - 99%)  Supplemental O2: on RA      Awake, alert, no c/o cp or SOB and cough has decreased now.  Still with c/o pain and stiffness both knees with very limited mobility.  No further abdominal pain now and tolerating po diet without difficulty.       PHYSICAL EXAM  General:  awake, alert, sitting upright in bed, pleasant and cooperative, in NAD now  HEENT:  conj pink, sclerae anicteric, PERRLA, no oral lesions noted  Neck:  supple, no nodes noted  Heart:  RR  Lungs: clear bilaterally now   Abdomen:  soft, BS+, nontender to palpation  No CVA or Spinal tenderness elicited to palpation  Extremities:  no edema LE's                     both knees remain with swelling and limited ROM, no erythema noted and minimal warmth to touch  Skin:  warm, dry, no rash noted  External Female Catheter in place and draining clear daniele-colored urin        I&O's Summary :    18 Aug 2019 07:01  -  19 Aug 2019 07:00  --------------------------------------------------------  IN: 200 mL / OUT: 700 mL / NET: -500 mL    19 Aug 2019 07:01  -  19 Aug 2019 15:57  --------------------------------------------------------  IN: 0 mL / OUT: 800 mL / NET: -800 mL        LABS:  CBC Full  -  ( 19 Aug 2019 06:04 )  WBC Count : 7.53 K/uL  RBC Count : 3.12 M/uL  Hemoglobin : 8.7 g/dL  Hematocrit : 25.6 %  Platelet Count - Automated : 576 K/uL  Mean Cell Volume : 82.1 fl  Mean Cell Hemoglobin : 27.9 pg  Mean Cell Hemoglobin Concentration : 34.0 gm/dL  Auto Neutrophil # : 4.04 K/uL  Auto Lymphocyte # : 2.39 K/uL  Auto Monocyte # : 0.71 K/uL  Auto Eosinophil # : 0.26 K/uL  Auto Basophil # : 0.06 K/uL  Auto Neutrophil % : 53.7 %  Auto Lymphocyte % : 31.7 %  Auto Monocyte % : 9.4 %  Auto Eosinophil % : 3.5 %  Auto Basophil % : 0.8 %    08-19    128<L>  |  96  |  13  ----------------------------<  118<H>  4.3   |  24  |  0.79    Ca    9.4      19 Aug 2019 06:04        Acute Hepatitis Panel (08.17.19 @ 12:47)    Hepatitis C Virus Interpretation: Nonreact: Hepatitis C AB  S/CO Ratio                        Interpretation  < 1.00                                   Non-Reactive  1.00 - 4.99                         Weakly-Reactive  >= 5.00                                Reactive  Non-Reactive: A person witha non-reactive HCV antibody result is  considered uninfected.  No further action is needed unless recent  infection is suspected.  In these cases, consider repeat testing later to  detect seroconversion..  Weakly-Reactive: HCV antibody test is abnormal, HCV RNA Qualitative test  will follow.  Reactive: HCV antibody test is abnormal, HCV RNA Qualitative test will  follow.  Note: HCV antibody testing is performed on the Abbott  system.    Hepatitis C Virus S/CO Ratio: 0.31 S/CO    Hepatitis B Core IgM Antibody: Nonreact    Hepatitis B Surface Antigen: Nonreact    Hepatitis A IgM Antibody: Nonreact      Hepatitis B Surface Antibody (08.17.19 @ 12:47)    Hepatitis B Surface Antibody: Nonreact    MATTY - pending    Rheumatoid Factor Quant, Serum or Plasma: <10 IU/mL (08-18 @ 12:08)    Sedimentation Rate, Erythrocyte: 108 mm/hr (08-17 @ 07:59)    CRP - 7.53 ( 8/19/19)      Lipase, Serum: 376 U/L (08-15 @ 18:56)        MICROBIOLOGY:  Specimen Source: .Blood (08-16 @ 01:27)  Culture Results:   No growth to date. (08-16 @ 01:27)    Specimen Source: .Blood (08-16 @ 01:27)  Culture Results:   No growth to date. (08-16 @ 01:27)    Specimen Source: .Urine (08-16 @ 01:18)  Culture Results:   No growth (08-16 @ 01:18)            Radiology:    < from: MR Knee No Cont, Left (08.18.19 @ 13:18) >  EXAM:  MR KNEE LT                          PROCEDURE DATE:  08/18/2019      INTERPRETATION:    MRI OF THE LEFT KNEE    CLINICAL INDICATION: Knee pain and swelling. Indeterminant complex lesion   visualized on ultrasound.  TECHNIQUE: Multiplanar, Multisequence MRI was obtained of the left knee.    FINDINGS:    CRUCIATE AND COLLATERAL LIGAMENTS: Anterior cruciate ligament is not   visualized. Thickening of the posterior cruciate ligament. Collateral   ligaments are intact.  MEDIAL COMPARTMENT: Meniscus is diffusely degenerated and torn. Diffuse   broad-based full-thickness chondral loss with subchondral edema with   joint remodeling and osteophyte formation.  LATERAL COMPARTMENT: Degeneration of the posterior root insertion and   anterior horn. High-grade partial-thickness chondral loss with large   osteophyte formation. The lateral femoral condyle appears mildly   posteriorly translated with respect to the lateral tibial plateau.  PATELLOFEMORAL COMPARTMENT: Low-grade chondral wear. Marginal osteophytes.  EXTENSOR MECHANISM: Quadriceps and patellar tendons are intact.  SYNOVIUM/ JOINT FLUID: Large joint effusion with synovitis and   debris/articular bodies. Large complex loculated Baker's cyst with fluid   tracking along the superficial myofascial plane of the medial   gastrocnemius, indicative of leakage/partial rupture.  BONE MARROW: Diffuse marrow edema, most notably within the tibial   plateaus.  MUSCLES: No muscle edema or high-grade atrophy.  NEUROVASCULAR STRUCTURES:The neurovascular structures are unremarkable.  SUBCUTANEOUS SOFT TISSUES: The cutaneous tissues are intact.    IMPRESSION:   Severe medial and lateral compartment osteoarthrosis. Lateral femoral   condyle appears posteriorly translated with respect to the lateral tibial   plateau. Osseous edema without fracture within the medial lateral tibial   plateaus.  Large joint effusion with synovitis and debris as well as large loculated   complex Baker's cyst with fluid tracking along the superficial myofascial   plane of the medial head of the gastrocnemius, indicative of   leakage/partial rupture.                                                           < from: MR Knee No Cont, Right (08.18.19 @ 12:50) >  EXAM:  MR KNEE RT                          PROCEDURE DATE:  08/18/2019      INTERPRETATION:    MRI OF THE RIGHT KNEE    CLINICAL INDICATION: Popliteal cyst. Right calf pain and tenderness.  TECHNIQUE: Multiplanar, Multisequence MRI was obtained of the right knee.    FINDINGS:    CRUCIATE AND COLLATERAL LIGAMENTS: The anterior cruciate ligament is not   well visualized. The posterior cruciate ligament is intact. The medial   lateral collateral ligaments are intact.  MEDIAL COMPARTMENT: Complex tearing of the posterior horn of the medial   meniscus. Broad-based full-thickness chondral loss with osseous edema and   osteophytosis.  LATERAL COMPARTMENT: Lateral meniscus is grossly intact. Diffuse   high-grade partial-thickness chondral loss. Edema within the lateral   tibial plateau. Osteophyte formation.  PATELLOFEMORAL COMPARTMENT: High-grade chondral loss along the median   ridge and medial patellar facet.  EXTENSOR MECHANISM: Quadriceps and patellar tendons are intact.  SYNOVIUM/ JOINT FLUID: Joint effusion and synovitis. There is a small   Baker's cyst with fluid tracking along the superficial myofascial plane   of the medial gastrocnemius, consistent with leakage/partial rupture.  BONE MARROW: No acute fracture or osteonecrosis. The medial and lateral   compartment subchondral edema, degenerative.  MUSCLES: No muscle edema or high-grade atrophy.  NEUROVASCULAR STRUCTURES: Course of the neurovascular structures are   unremarkable.  SUBCUTANEOUS SOFT TISSUES: Subcutaneous tissues are intact.    IMPRESSION:   Moderate lateral and patellofemoral and severe medial compartment   osteoarthrosis.  Large joint effusion and synovitis. Small Baker's cyst with fluid   tracking along the superficial myofascial plane of the medial   gastrocnemius, indicative of leakage/partial rupture.                       < from: CT Chest No Cont (08.17.19 @ 18:49) >  CT Chest Without Contrast     EXAM DATE/TIME:   8/17/2019 6:36 PM     CLINICAL HISTORY:   86 years old, female; Cough and fever     TECHNIQUE:   Imaging protocol: Axial computed tomography images of the chest without   intravenous contrast. Coronal and sagittal reformatted images were   created and   reviewed.     COMPARISON:   DX XR CHEST URGENT 8/15/2019 8:17 PM, CT 8/29/2017    FINDINGS:   Lungs: Central tracheobronchial tree is grossly patent. Minimal dependent   ground glass and interstitial opacities with scattered atelectasis or   scar. No acute consolidation.   Pleural space: No pneumothorax. No pleural effusion.   Heart: Severe coronary artery calcification. Heart enlarged in size.   Minimal pericardial effusion.   Aorta: Ectasia of the ascending aorta. Calcified plaque of the aorta.   Lymph nodes:. No enlarged lymph nodes.   Bones/joints: Bones osteopenic with degenerative changes. Old right rib   fractures.   Soft tissues: Unremarkable.   Gallbladder and bile ducts: Gallstones  Kidneys and ureters: Left renal cyst.   Liver: Hypodense lesion in the medial hepatic dome unchanged.    IMPRESSION:   1. Minimal bibasilar atelectasis and dependent lung changes.. No acute   consolidation.   2. Minimal pericardial effusion.   3. Other findings as described above.          Impression:  Temps remain decreased on present ab rx with Zosyn for continued rx of Sepsis due to Acute Cholecystitis with Cholelithiasis with repeat Blood  Urine Cx's negative to date from 8/16/19 after episode of recurrent fever to 102.  Above findings on CT Chest noted - no acute consolidation seen.  Significant abnormalities noted on MRI's of both Knees including  joint effusions, Sinovitis, and partially ruptured Baker's Cysts ( Lt > Rt. )      Suggestions:   Will continue current ab rx with Zosyn for now and await Ortho evaluation regarding MRI findings of both Knees.  Continue to follow temps and labs.  Discussed in detail with patient at bedside and with Dr. Woods.

## 2019-08-19 NOTE — CONSULT NOTE ADULT - PROVIDER SPECIALTY LIST ADULT
Cardiology
Gastroenterology
Hospitalist
Hospitalist
Intervent Radiology
Orthopedics
Infectious Disease

## 2019-08-19 NOTE — CONSULT NOTE ADULT - CONSULT REASON
Sepsis
Medical Mgmt
R/o septic knee
chronic systolic congestive heart failure
gallstones
medical co management
perc joaquin

## 2019-08-19 NOTE — CONSULT NOTE ADULT - CONSULT REQUESTED DATE/TIME
16-Aug-2019
08-Aug-2019 16:19
09-Aug-2019 17:24
10-Aug-2019 23:14
12-Aug-2019 15:18
15-Aug-2019 15:01
19-Aug-2019 17:14

## 2019-08-19 NOTE — PROGRESS NOTE ADULT - ASSESSMENT
85 y/o female with history of GI  ulcer presents with right upper quadrant abdominal pain, found to have gall stone.      Fever:   -Resolved   -no leukocytosis, positive CRP   -ID consult appreciated.   -CT chest done; non-infectious   - f/u  MRI of knee shows ruptured popliteal cyst      Cholelithiases  - No choledocholithiasis in MRCP  - HIDA is neg for acute cholecystitis  - GI and Surgery on board   - No Sx planned, outpt lap joaquin    Chronic diastolic congestive heart failure:  - Stable  - Continue Enalapril and metoprolol     DM:  - Insulin SS    Hyponatremia:  - Resolved.     HTN:  - Continue current meds    Bilateral Rupture Popliteal cyst   - as seen on MRI knee   -Orthopedic surgery consulted, f/u recommendations  -f/u Xray of knees.      DVTp: Heparin   Disposition: PATEL

## 2019-08-20 ENCOUNTER — TRANSCRIPTION ENCOUNTER (OUTPATIENT)
Age: 84
End: 2019-08-20

## 2019-08-20 VITALS
TEMPERATURE: 98 F | RESPIRATION RATE: 16 BRPM | OXYGEN SATURATION: 98 % | SYSTOLIC BLOOD PRESSURE: 134 MMHG | DIASTOLIC BLOOD PRESSURE: 65 MMHG | HEART RATE: 72 BPM

## 2019-08-20 LAB
ANA TITR SER: NEGATIVE — SIGNIFICANT CHANGE UP
ANION GAP SERPL CALC-SCNC: 9 MMOL/L — SIGNIFICANT CHANGE UP (ref 5–17)
B PERT IGG+IGM PNL SER: ABNORMAL
BUN SERPL-MCNC: 20 MG/DL — SIGNIFICANT CHANGE UP (ref 7–23)
CALCIUM SERPL-MCNC: 9.5 MG/DL — SIGNIFICANT CHANGE UP (ref 8.5–10.1)
CHLORIDE SERPL-SCNC: 98 MMOL/L — SIGNIFICANT CHANGE UP (ref 96–108)
CO2 SERPL-SCNC: 25 MMOL/L — SIGNIFICANT CHANGE UP (ref 22–31)
COLOR FLD: SIGNIFICANT CHANGE UP
CREAT SERPL-MCNC: 0.89 MG/DL — SIGNIFICANT CHANGE UP (ref 0.5–1.3)
FLUID INTAKE SUBSTANCE CLASS: SIGNIFICANT CHANGE UP
FLUID SEGMENTED GRANULOCYTES: 73 % — SIGNIFICANT CHANGE UP
GLUCOSE BLDC GLUCOMTR-MCNC: 123 MG/DL — HIGH (ref 70–99)
GLUCOSE BLDC GLUCOMTR-MCNC: 163 MG/DL — HIGH (ref 70–99)
GLUCOSE BLDC GLUCOMTR-MCNC: 178 MG/DL — HIGH (ref 70–99)
GLUCOSE SERPL-MCNC: 112 MG/DL — HIGH (ref 70–99)
GRAM STN FLD: SIGNIFICANT CHANGE UP
HCT VFR BLD CALC: 26.7 % — LOW (ref 34.5–45)
HGB BLD-MCNC: 9 G/DL — LOW (ref 11.5–15.5)
LYMPHOCYTES # FLD: 10 % — SIGNIFICANT CHANGE UP
MCHC RBC-ENTMCNC: 27.8 PG — SIGNIFICANT CHANGE UP (ref 27–34)
MCHC RBC-ENTMCNC: 33.7 GM/DL — SIGNIFICANT CHANGE UP (ref 32–36)
MCV RBC AUTO: 82.4 FL — SIGNIFICANT CHANGE UP (ref 80–100)
MONOS+MACROS # FLD: 17 % — SIGNIFICANT CHANGE UP
NRBC # BLD: 0 /100 WBCS — SIGNIFICANT CHANGE UP (ref 0–0)
PLATELET # BLD AUTO: 695 K/UL — HIGH (ref 150–400)
POTASSIUM SERPL-MCNC: 4.3 MMOL/L — SIGNIFICANT CHANGE UP (ref 3.5–5.3)
POTASSIUM SERPL-SCNC: 4.3 MMOL/L — SIGNIFICANT CHANGE UP (ref 3.5–5.3)
RBC # BLD: 3.24 M/UL — LOW (ref 3.8–5.2)
RBC # FLD: 14.6 % — HIGH (ref 10.3–14.5)
RCV VOL RI: HIGH /UL (ref 0–0)
SODIUM SERPL-SCNC: 132 MMOL/L — LOW (ref 135–145)
SPECIMEN SOURCE FLD: SIGNIFICANT CHANGE UP
SPECIMEN SOURCE: SIGNIFICANT CHANGE UP
SYNOVIAL CRYSTALS CLARITY: ABNORMAL
SYNOVIAL CRYSTALS COLOR: ABNORMAL
SYNOVIAL CRYSTALS ID: ABNORMAL
SYNOVIAL CRYSTALS TUBE: SIGNIFICANT CHANGE UP
TOTAL NUCLEATED CELL COUNT, BODY FLUID: 1502 /UL — SIGNIFICANT CHANGE UP
TUBE TYPE: SIGNIFICANT CHANGE UP
WBC # BLD: 6.69 K/UL — SIGNIFICANT CHANGE UP (ref 3.8–10.5)
WBC # FLD AUTO: 6.69 K/UL — SIGNIFICANT CHANGE UP (ref 3.8–10.5)

## 2019-08-20 PROCEDURE — 99239 HOSP IP/OBS DSCHRG MGMT >30: CPT

## 2019-08-20 RX ORDER — KETOROLAC TROMETHAMINE 30 MG/ML
15 SYRINGE (ML) INJECTION ONCE
Refills: 0 | Status: DISCONTINUED | OUTPATIENT
Start: 2019-08-20 | End: 2019-08-20

## 2019-08-20 RX ORDER — COLCHICINE 0.6 MG
1 TABLET ORAL
Qty: 7 | Refills: 0
Start: 2019-08-20 | End: 2019-08-26

## 2019-08-20 RX ORDER — COLCHICINE 0.6 MG
1.2 TABLET ORAL ONCE
Refills: 0 | Status: COMPLETED | OUTPATIENT
Start: 2019-08-20 | End: 2019-08-20

## 2019-08-20 RX ADMIN — PANTOPRAZOLE SODIUM 40 MILLIGRAM(S): 20 TABLET, DELAYED RELEASE ORAL at 07:45

## 2019-08-20 RX ADMIN — Medication 20 MILLIGRAM(S): at 06:02

## 2019-08-20 RX ADMIN — Medication 1.2 MILLIGRAM(S): at 11:06

## 2019-08-20 RX ADMIN — Medication 15 MILLIGRAM(S): at 06:35

## 2019-08-20 RX ADMIN — Medication 1: at 16:00

## 2019-08-20 RX ADMIN — Medication 100 MILLIGRAM(S): at 06:02

## 2019-08-20 RX ADMIN — Medication 1: at 11:05

## 2019-08-20 RX ADMIN — Medication 650 MILLIGRAM(S): at 00:21

## 2019-08-20 RX ADMIN — HEPARIN SODIUM 5000 UNIT(S): 5000 INJECTION INTRAVENOUS; SUBCUTANEOUS at 07:47

## 2019-08-20 RX ADMIN — AMLODIPINE BESYLATE 10 MILLIGRAM(S): 2.5 TABLET ORAL at 06:02

## 2019-08-20 RX ADMIN — Medication 20 MILLIGRAM(S): at 17:17

## 2019-08-20 RX ADMIN — PIPERACILLIN AND TAZOBACTAM 25 GRAM(S): 4; .5 INJECTION, POWDER, LYOPHILIZED, FOR SOLUTION INTRAVENOUS at 13:29

## 2019-08-20 RX ADMIN — Medication 100 MILLIGRAM(S): at 17:17

## 2019-08-20 RX ADMIN — PIPERACILLIN AND TAZOBACTAM 25 GRAM(S): 4; .5 INJECTION, POWDER, LYOPHILIZED, FOR SOLUTION INTRAVENOUS at 06:02

## 2019-08-20 RX ADMIN — Medication 15 MILLIGRAM(S): at 06:50

## 2019-08-20 NOTE — PROGRESS NOTE ADULT - PROVIDER SPECIALTY LIST ADULT
Cardiology
Gastroenterology
Hospitalist
Infectious Disease
Infectious Disease
Orthopedics
Surgery
Hospitalist
Gastroenterology
Surgery
Surgery
Hospitalist

## 2019-08-20 NOTE — PROGRESS NOTE ADULT - REASON FOR ADMISSION
Abdominal pain

## 2019-08-20 NOTE — PROGRESS NOTE ADULT - SUBJECTIVE AND OBJECTIVE BOX
Patient seen and examined at bedside this am. Pain in R Knee has improved some this am. Pt afebrile overnight, denies fever/chills. No other acute overnight events. No other complaints at this time.       Vital Signs Last 24 Hrs  T(C): 36.4 (20 Aug 2019 05:04), Max: 37.1 (19 Aug 2019 17:39)  T(F): 97.6 (20 Aug 2019 05:04), Max: 98.8 (19 Aug 2019 17:39)  HR: 68 (20 Aug 2019 05:04) (68 - 96)  BP: 118/56 (20 Aug 2019 05:04) (111/64 - 126/68)  BP(mean): --  RR: 17 (20 Aug 2019 05:04) (16 - 17)  SpO2: 100% (20 Aug 2019 05:04) (96% - 100%)    PE:    Gen: NAD    RLE:  Skin intact, warm well perfused  + moderate knee effusion  No increased warmth of the R Knee   Non TTP over Patella  TTP over medial/lateral tibial joint lines   SILT L3-S1  + EHL/FHL/TA/GSC  + DP/PT pulses  Knee ROM 0-30 with pain    Compartments soft, compressible    LLE:  Skin intact, warm well perfused  + mild knee effusion  NO increased warmth Knee joint  Non TTP over patella  Non TTP over Medial/Lateral Tibia joint line   SILT L3-S1  + EHL/FHL/TA/HSC  + DP/PT pulses  Knee passive ROM 0-40 without pain  Knee passive ROM  with pain  compartments soft and compressible

## 2019-08-20 NOTE — DISCHARGE NOTE NURSING/CASE MANAGEMENT/SOCIAL WORK - NSDCDPATPORTLINK_GEN_ALL_CORE
You can access the [facility], offered by [facility], by registering with the following website: [email or web address]

## 2019-08-21 LAB
CULTURE RESULTS: SIGNIFICANT CHANGE UP
CULTURE RESULTS: SIGNIFICANT CHANGE UP
SPECIMEN SOURCE: SIGNIFICANT CHANGE UP
SPECIMEN SOURCE: SIGNIFICANT CHANGE UP

## 2019-08-26 DIAGNOSIS — K86.2 CYST OF PANCREAS: ICD-10-CM

## 2019-08-26 DIAGNOSIS — E87.1 HYPO-OSMOLALITY AND HYPONATREMIA: ICD-10-CM

## 2019-08-26 DIAGNOSIS — M25.561 PAIN IN RIGHT KNEE: ICD-10-CM

## 2019-08-26 DIAGNOSIS — E87.6 HYPOKALEMIA: ICD-10-CM

## 2019-08-26 DIAGNOSIS — M25.562 PAIN IN LEFT KNEE: ICD-10-CM

## 2019-08-26 DIAGNOSIS — Z91.09 OTHER ALLERGY STATUS, OTHER THAN TO DRUGS AND BIOLOGICAL SUBSTANCES: ICD-10-CM

## 2019-08-26 DIAGNOSIS — I11.0 HYPERTENSIVE HEART DISEASE WITH HEART FAILURE: ICD-10-CM

## 2019-08-26 DIAGNOSIS — K80.20 CALCULUS OF GALLBLADDER WITHOUT CHOLECYSTITIS WITHOUT OBSTRUCTION: ICD-10-CM

## 2019-08-26 DIAGNOSIS — Z79.84 LONG TERM (CURRENT) USE OF ORAL HYPOGLYCEMIC DRUGS: ICD-10-CM

## 2019-08-26 DIAGNOSIS — E11.9 TYPE 2 DIABETES MELLITUS WITHOUT COMPLICATIONS: ICD-10-CM

## 2019-08-26 DIAGNOSIS — Z99.89 DEPENDENCE ON OTHER ENABLING MACHINES AND DEVICES: ICD-10-CM

## 2019-08-26 DIAGNOSIS — M66.0 RUPTURE OF POPLITEAL CYST: ICD-10-CM

## 2019-08-26 DIAGNOSIS — E78.5 HYPERLIPIDEMIA, UNSPECIFIED: ICD-10-CM

## 2019-08-26 DIAGNOSIS — M11.20 OTHER CHONDROCALCINOSIS, UNSPECIFIED SITE: ICD-10-CM

## 2019-08-26 DIAGNOSIS — I50.32 CHRONIC DIASTOLIC (CONGESTIVE) HEART FAILURE: ICD-10-CM

## 2019-08-26 DIAGNOSIS — K25.7 CHRONIC GASTRIC ULCER WITHOUT HEMORRHAGE OR PERFORATION: ICD-10-CM

## 2019-08-26 DIAGNOSIS — M16.0 BILATERAL PRIMARY OSTEOARTHRITIS OF HIP: ICD-10-CM

## 2019-09-02 LAB
CULTURE RESULTS: SIGNIFICANT CHANGE UP
GRAM STN FLD: SIGNIFICANT CHANGE UP
SPECIMEN SOURCE: SIGNIFICANT CHANGE UP

## 2019-11-03 NOTE — ED ADULT NURSE NOTE - TEMPLATE
Aware awaiting test results, call bell within reach, bed in low position     Marcelina Santiago, [facility]  11/03/19 3661 Abdominal Pain, N/V/D

## 2020-12-15 PROBLEM — Z12.11 ENCOUNTER FOR SCREENING COLONOSCOPY: Status: RESOLVED | Noted: 2017-09-28 | Resolved: 2020-12-15

## 2021-01-11 NOTE — DISCHARGE NOTE ADULT - NSCORESITESY/N_GEN_A_CORE_RD
Health Maintenance Due   Topic Date Due   • Pneumococcal Vaccine 0-64 (1 of 3 - PCV13) 08/02/2002   • Influenza Vaccine (1) 09/01/2020       Patient is due for topics as listed above but is not proceeding with Immunization(s) Influenza and Pneumococcal at this time.            No

## 2021-10-06 PROBLEM — I10 ESSENTIAL HYPERTENSION: Status: ACTIVE | Noted: 2017-09-28

## 2021-10-07 NOTE — PROGRESS NOTE ADULT - PROBLEM SELECTOR PROBLEM 1
[Target Wt Loss Goal ___] : Weight Loss Goals: Target weight loss goal [unfilled] lbs [____ min/wk Activity] : [unfilled] min/wk activity Gastric ulcer

## 2021-12-15 NOTE — H&P ADULT - PROBLEM/PLAN-9
Kaushik Rocha  [address] [facility] [address]          12/15/2021      Dear Kaushik,    Recently you were referred to our Gastroenterology Department by your primary care provider for a colonoscopy.  I would like to share some important information about a colonoscopy. Colorectal cancer is a leading cause of cancer death in this [place]. Colorectal cancer can be stopped in its tracks or even prevented with a  colonoscopy.    Our office attempted to contact you by phone on 12/15/2021.  Please call [phone] to schedule your procedure with one of our Gastroenterology providers at either [facility],  [facility], or  [facility].  If you already spoke to someone in the GI Department and have your procedure scheduled you can disregard this letter.     If you decide to have your colonoscopy elsewhere, please call us at [phone] with the information so that we can update your record.    Your good health is important to us, a colonoscopy is important for you.    Sincerely,      [facility]  [address] [facility] [address]  
DISPLAY PLAN FREE TEXT

## 2022-11-06 NOTE — CONSULT NOTE ADULT - ATTENDING COMMENTS
No Pt seen and examined. Agree with fellow's note. Plan discussed with patient and primary team.     Patient had chief complaint of abdominal pain.     CTAP with BRENDEN, PUD, osteopenia, pancreatic lesion.     TTE with mild diastolic dysfunction.     Agree with note above.     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   Nonperforated gastric antral ulcer.  Indeterminate 1.0 cm right adrenal nodule. 7 mm cystic pancreatic lesion   which may represent a pseudocyst or sidebranch IPMN. Abdominal MRI would   be helpful for further evaluation.    Dr. Morley discussed the above findings with Dr Jamil at 755a on 8/29/17   with read back. Pt seen and examined. Agree with fellow's note. Plan discussed with patient and primary team and daughter    Patient had chief complaint of abdominal pain.   CTAP with BRENDEN, PUD, osteopenia, pancreatic lesion.   TTE with mild diastolic dysfunction.   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 Large peptic ulcer on cusp of perforation. Does not appear malignant. Recommend:  a) no endoscopic intervention as it could precipitate perforation  b) surgery consult  c) PPI BId  d) diet as tolerated  e) no NSAIDS if possible  Check H pylori stool Ag/serology and treat if positive.   f) Will sign off. Pt will need EGD in ~6 weeks to ensure healing. [phone].   g) If peritonitis, call surgery. Pt seen and examined. Agree with fellow's note. Plan discussed with patient and primary team and daughter (Shayy)    Patient had chief complaint of abdominal pain.   CTAP with BRENDEN, PUD, osteopenia, pancreatic lesion.   TTE with mild diastolic dysfunction.   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 Large peptic ulcer on cusp of perforation. Does not appear malignant. Recommend:  a) no endoscopic intervention as it could precipitate perforation  b) surgery consult  c) PPI BId  d) diet as tolerated  e) no NSAIDS if possible  Check H pylori stool Ag/serology and treat if positive.   f) Will sign off. Pt will need EGD in ~6 weeks to ensure healing. [phone].   g) If peritonitis, call surgery.  Fellow spoke to Shayy and pt after EGD cancelled today

## 2023-01-02 NOTE — PROGRESS NOTE ADULT - SUBJECTIVE AND OBJECTIVE BOX
Patient is a 86y old  Female who presents with a chief complaint of Abdominal pain (19 Aug 2019 15:56)      INTERVAL HPI/ OVERNIGHT EVENTS: Pt was seen and examined at bedside today, No significant overnight events, pt c/o some knee pain with movement, otherwise denies any complaints.     MEDICATIONS  (STANDING):  amLODIPine   Tablet 10 milliGRAM(s) Oral daily  atorvastatin 20 milliGRAM(s) Oral at bedtime  dextrose 5%. 1000 milliLiter(s) (50 mL/Hr) IV Continuous <Continuous>  dextrose 50% Injectable 12.5 Gram(s) IV Push once  dextrose 50% Injectable 25 Gram(s) IV Push once  dextrose 50% Injectable 25 Gram(s) IV Push once  enalapril 20 milliGRAM(s) Oral two times a day  heparin  Injectable 5000 Unit(s) SubCutaneous every 12 hours  insulin lispro (HumaLOG) corrective regimen sliding scale   SubCutaneous three times a day before meals  insulin lispro (HumaLOG) corrective regimen sliding scale   SubCutaneous at bedtime  metoprolol tartrate 100 milliGRAM(s) Oral two times a day  pantoprazole    Tablet 40 milliGRAM(s) Oral before breakfast  piperacillin/tazobactam IVPB.. 3.375 Gram(s) IV Intermittent every 8 hours    MEDICATIONS  (PRN):  acetaminophen   Tablet .. 650 milliGRAM(s) Oral every 6 hours PRN Temp greater or equal to 38C (100.4F),  acetaminophen   Tablet .. 650 milliGRAM(s) Oral every 6 hours PRN Mild Pain (1 - 3)  dextrose 40% Gel 15 Gram(s) Oral once PRN Blood Glucose LESS THAN 70 milliGRAM(s)/deciliter  glucagon  Injectable 1 milliGRAM(s) IntraMuscular once PRN Glucose LESS THAN 70 milligrams/deciliter  loratadine 10 milliGRAM(s) Oral daily PRN allergies  ondansetron Injectable 4 milliGRAM(s) IV Push every 6 hours PRN Nausea      Allergies    dust (Sneezing)  No Known Drug Allergies  POLLEN (Rhinorrhea; Sneezing)    Intolerances        REVIEW OF SYSTEMS:    Unable to examine due to [ ] Encephalopathy [ ] Advanced Dementia [ ] Expressive Aphasia [ ] Non-verbal patient    CONSTITUTIONAL: No fever, positive generalized weakness/Fatigue, No weight loss  EYES: No eye pain, visual disturbances, or discharge  ENMT:  No difficulty hearing, tinnitus, vertigo; No sinus or throat pain  NECK: No pain or stiffness  RESPIRATORY: No cough, No shortness of breath, wheezing, sputum or hemoptysis   CARDIOVASCULAR: No chest pain, palpitations, or leg swelling  GASTROINTESTINAL: No abdominal pain. No nausea, vomiting, diarrhea or constipation. No melena or hematochezia.  GENITOURINARY: No dysuria, frequency, hematuria, or incontinence  NEUROLOGICAL: No headaches, Dizziness, memory loss, loss of strength, numbness, or tremors  SKIN: No itching, burning, rashes, or lesions   MUSCULOSKELETAL: bilateral knee pain   PSYCHIATRIC: No depression, anxiety, mood swings, or difficulty sleeping  HEME/LYMPH: No easy bruising, or bleeding gums        Vital Signs Last 24 Hrs  T(C): 36.6 (19 Aug 2019 11:16), Max: 36.6 (19 Aug 2019 11:16)  T(F): 97.8 (19 Aug 2019 11:16), Max: 97.8 (19 Aug 2019 11:16)  HR: 77 (19 Aug 2019 11:16) (61 - 79)  BP: 120/57 (19 Aug 2019 11:16) (120/57 - 139/68)  BP(mean): --  RR: 16 (19 Aug 2019 11:16) (16 - 17)  SpO2: 99% (19 Aug 2019 11:16) (98% - 99%)    PHYSICAL EXAM:  GENERAL: NAD, well-developed, well-groomed  HEAD:  Atraumatic, Normocephalic  EYES: conjunctiva and sclera clear  ENMT: Moist mucous membranes  NECK: Supple, No JVD, Normal thyroid  CHEST/LUNG: Clear to Auscultation bilaterally; No rales, rhonchi, wheezing, or rubs  HEART: Regular rate and rhythm; No murmurs, rubs, or gallops  ABDOMEN: Soft, Nontender, Nondistended; Bowel sounds present  EXTREMITIES:  2+ Peripheral Pulses, No clubbing, cyanosis, or edema  SKIN: No rashes or lesions  NERVOUS SYSTEM:  Alert & Oriented X3, Good concentration; Motor Strength 5/5 B/L upper and lower extremities        LABS:                        8.7    7.53  )-----------( 576      ( 19 Aug 2019 06:04 )             25.6     08-19    128<L>  |  96  |  13  ----------------------------<  118<H>  4.3   |  24  |  0.79    Ca    9.4      19 Aug 2019 06:04          CAPILLARY BLOOD GLUCOSE      POCT Blood Glucose.: 121 mg/dL (19 Aug 2019 15:47)  POCT Blood Glucose.: 135 mg/dL (19 Aug 2019 10:44)  POCT Blood Glucose.: 124 mg/dL (19 Aug 2019 07:53)  POCT Blood Glucose.: 129 mg/dL (18 Aug 2019 21:35)        Culture - Blood (collected 08-16-19)  Source: .Blood  Preliminary Report (08-17-19):    No growth to date.    Culture - Blood (collected 08-16-19)  Source: .Blood  Preliminary Report (08-17-19):    No growth to date.    Culture - Urine (collected 08-16-19)  Source: .Urine  Final Report (08-17-19):    No growth        RADIOLOGY & ADDITIONAL TESTS:          Imaging Personally Reviewed:  [ ] YES  [ ] NO    Consultant(s) Notes Reviewed:  [x ] YES  [ ] NO    Care Discussed with Consultants/Other Providers [x ] YES  [ ] NO English

## 2024-12-30 NOTE — PATIENT PROFILE ADULT - NSPROPASSIVESMOKEEXPOSURE_GEN_A_NUR
Please refer to 12/30/24 telephone encounter for follow-up details.       Yenifer Grey, RN  Endocrine Care Coordinator  [facility]     No